# Patient Record
Sex: FEMALE | Race: BLACK OR AFRICAN AMERICAN | NOT HISPANIC OR LATINO | Employment: FULL TIME | URBAN - METROPOLITAN AREA
[De-identification: names, ages, dates, MRNs, and addresses within clinical notes are randomized per-mention and may not be internally consistent; named-entity substitution may affect disease eponyms.]

---

## 2017-10-01 ENCOUNTER — HOSPITAL ENCOUNTER (EMERGENCY)
Facility: HOSPITAL | Age: 57
Discharge: HOME | End: 2017-10-01
Attending: EMERGENCY MEDICINE
Payer: MEDICAID

## 2017-10-01 VITALS
OXYGEN SATURATION: 96 % | DIASTOLIC BLOOD PRESSURE: 85 MMHG | HEIGHT: 59 IN | SYSTOLIC BLOOD PRESSURE: 145 MMHG | HEART RATE: 75 BPM | TEMPERATURE: 98 F | BODY MASS INDEX: 38.71 KG/M2 | WEIGHT: 192 LBS | RESPIRATION RATE: 16 BRPM

## 2017-10-01 DIAGNOSIS — R51.9 HEADACHE IN FRONT OF HEAD: Primary | ICD-10-CM

## 2017-10-01 PROCEDURE — 82962 GLUCOSE BLOOD TEST: CPT

## 2017-10-01 PROCEDURE — 99283 EMERGENCY DEPT VISIT LOW MDM: CPT | Mod: 25

## 2017-10-01 PROCEDURE — 25000003 PHARM REV CODE 250: Performed by: EMERGENCY MEDICINE

## 2017-10-01 RX ORDER — BUTALBITAL, ACETAMINOPHEN AND CAFFEINE 50; 325; 40 MG/1; MG/1; MG/1
2 TABLET ORAL
Status: COMPLETED | OUTPATIENT
Start: 2017-10-01 | End: 2017-10-01

## 2017-10-01 RX ORDER — IBUPROFEN 600 MG/1
600 TABLET ORAL EVERY 6 HOURS PRN
Qty: 20 TABLET | Refills: 0 | Status: SHIPPED | OUTPATIENT
Start: 2017-10-01 | End: 2021-03-19

## 2017-10-01 RX ADMIN — BUTALBITAL, ACETAMINOPHEN AND CAFFEINE 2 TABLET: 50; 325; 40 TABLET ORAL at 08:10

## 2017-10-01 NOTE — ED TRIAGE NOTES
55 yo female comes in with the cc of headache and light headedness. Pt claims she hasn;t been on bp meds for two months . Pt denies any other pains or n/v.

## 2017-10-01 NOTE — DISCHARGE INSTRUCTIONS
Please return immediately if you get worse or if new problems develop.  Please make an appointment to see her primary care doctor this week.  Rest.  Ibuprofen for headache.

## 2017-10-01 NOTE — ED PROVIDER NOTES
Encounter Date: 10/1/2017    SCRIBE #1 NOTE: I, Lopez Leeseamus, am scribing for, and in the presence of, Сергей Garg MD. Other sections scribed: HPI, ROS.       History     Chief Complaint   Patient presents with    Headache     headache and light headed x 1 week.  comes and goes.  denies n/v/d or fever.  denies blurred or double vision.   Out of BP meds since 9/13.  difficulty getting cleared with ins.     CC: Headache  HPI: This 56 y.o. female with Hx of HTN, DM, hystertectomy presents to the ED c/o elevated blood pressure for the past 1-2 months. Pt states that since 8/13 she has been unable to fill her prescription for her blood pressure medication. Pt states that she has been having HAs, and is having a mild frontal HA this morning. She denies fever, chest pain, SOB, dizziness.        The history is provided by the patient.     Review of patient's allergies indicates:  No Known Allergies  Past Medical History:   Diagnosis Date    Diabetes mellitus     Hypertension      Past Surgical History:   Procedure Laterality Date    HYSTERECTOMY       No family history on file.  Social History   Substance Use Topics    Smoking status: Never Smoker    Smokeless tobacco: Not on file    Alcohol use No     Review of Systems   Constitutional: Negative for chills and fever.   HENT: Negative for rhinorrhea and sore throat.    Eyes: Negative for pain and visual disturbance.   Respiratory: Negative for cough and shortness of breath.    Cardiovascular: Negative for chest pain.   Gastrointestinal: Negative for abdominal pain, diarrhea, nausea and vomiting.   Genitourinary: Negative for dysuria and flank pain.   Musculoskeletal: Negative for arthralgias and myalgias.   Skin: Negative for rash and wound.   Neurological: Positive for headaches. Negative for dizziness and syncope.       Physical Exam     Initial Vitals [10/01/17 0801]   BP Pulse Resp Temp SpO2   (!) 145/85 75 16 98.4 °F (36.9 °C) 96 %      MAP       105          Physical Exam  The patient was examined specifically for the following:   General:No significant distress, Good color, Warm and dry. Head and neck:Scalp atraumatic, Neck supple. Neurological:Appropriate conversation, Gross motor deficits. Eyes:Conjugate gaze, Clear corneas. ENT: No epistaxis. Cardiac: Regular rate and rhythm, Grossly normal heart tones. Pulmonary: Wheezing, Rales. Gastrointestinal: Abdominal tenderness, Abdominal distention. Musculoskeletal: Extremity deformity, Apparent pain with range of motion of the joints. Skin: Rash.   The findings on examination were normal except for the following: The patient's blood pressures 150/78.  The patient's blood sugar is 141.  The lungs are clear.  The heart tones are normal.  The neck is supple.  Mental status examination, cranial nerves, motor and sensory examination are normal.  ED Course   Procedures  Labs Reviewed   POCT GLUCOSE - Abnormal; Notable for the following:        Result Value    POCT Glucose 144 (*)     All other components within normal limits         Medical decision making: Given the above, this patient presents to the emergency room with a frontal headache.  It is mild.  The patient has no neck pain or neurologic deficits there is no history of head trauma or fever.  Patient is worried about her blood pressure but she    has an acceptable blood pressure at 151/70 during the physical exam.  She is worried about her diabetes but her blood sugars 141 on metformin.  She is not on any blood pressure medicine. I will discharge this patient outpatient evaluation and treatment on ibuprofen.  I will have her follow-up primary care.  The patient is not distressed.  I doubt meningitis subarachnoid hemorrhage and intraparenchymal bleeding.                   Scribe Attestation:   Scribe #1: I performed the above scribed service and the documentation accurately describes the services I performed. I attest to the accuracy of the note.    Attending  Attestation:           Physician Attestation for Scribe:  Physician Attestation Statement for Scribe #1: I, Сергей Garg MD, reviewed documentation, as scribed by Lopez Drake in my presence, and it is both accurate and complete.                 ED Course      Clinical Impression:   The encounter diagnosis was Headache in front of head.                           Сергей Garg MD  10/03/17 0722

## 2017-10-02 LAB — POCT GLUCOSE: 144 MG/DL (ref 70–110)

## 2020-02-13 NOTE — PROGRESS NOTES
Assessment & Plan (all problems are new to me)  Problem List Items Addressed This Visit        Cardiac/Vascular    Essential hypertension    Current Assessment & Plan     Not at goal, pt does not take meds on a regular basis. She took none for a while as no insurance. I urged her to take her meds until next visit, will do BW and will reassess in one week.         Relevant Orders    CBC auto differential    Comprehensive metabolic panel    Heart murmur    Current Assessment & Plan     As reported by pt (Dx in her teens), but PE normal today            Renal/    Pain of both breasts    Current Assessment & Plan     No concerning element at PE. Strong family Hx. Will order mammo         Relevant Orders    Mammo Digital Diagnostic Bilateral       Endocrine    Type 2 diabetes mellitus with hyperglycemia, without long-term current use of insulin    Current Assessment & Plan     No info, pt is on metformin and glipizide, she does not check her BS, does not monitor lifestyle. Will do BW, will refer for eye exam, and will check feet at next visit. I will also refer to DME as pt states she does not know how to use meter? Next visit one week         Relevant Medications    glipiZIDE (GLUCOTROL) 10 MG tablet    Other Relevant Orders    Lipid panel    Hemoglobin A1c    Microalbumin/creatinine urine ratio    BMI 40.0-44.9, adult    Current Assessment & Plan     Needs to work on lifestyle         Vitamin D deficiency    Current Assessment & Plan     Will check level         Relevant Orders    Vitamin D       Orthopedic    Arthritis of ankle    Current Assessment & Plan     Difficult to assess today, will reassess at next visit. Chronic x years in a pt who is ?            Other    Routine general medical examination at a health care facility    Current Assessment & Plan     Had flu shot, but needs to be updated for all other preventative measures         Relevant Orders    TSH    Hepatitis C antibody    HIV 1/2 Ag/Ab  (4th Gen)      Other Visit Diagnoses     Establishing care with new doctor, encounter for    -  Primary    Pt not seen by PCP for months. Multiple problems. Will assess today, do BW and see her back in one week to reassess            Health Maintenance reviewed, only had flu shot done this season, all other measures to be reviewed..    Follow-up: Follow up in about 1 week (around 2/21/2020).  ______________________________________________________________________    Chief Complaint  Chief Complaint   Patient presents with    Cough       HPI  Joanie Chandler is a 59 y.o. female pt with multiple medical diagnoses as listed in the medical history and problem list that presents for what she states at triage to be cough but then she states she does not come for this reason? Pt is new to me. She states she had a PCP before who is not present anymore, she also lost her insurance for a while, it is unclear when she was seen by PCP last (around 6 months ago?), and when she was able to resume medications. She is here with her 81 y old mother who adopted her when she was 6 year old.  As per problems:  1) sore breasts, bilaterally, x 1 month. No lumps at self exam, no discharge. She cannot remember when she had her last mammo. She has a strong family H/O breast cancer (3 sisters, one of them passed away from cancer. These are her biological sisters).  2) DM: it is unclear if she took her meds and which ones. She never received insulin. She does not know her A1C, she does not check her BS at her place, even if she has a meter, nor does she have regular eye and feet controls.  3) HTN: she is on CCB and ARB but takes them randomly, last time one week ago? Always in the PM as she states they make her sleepy.  4) ankle pain: bilateral, she states she has OA?  5) heart murmur: states she was diagnosed when she was in her teens.  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        PAST MEDICAL HISTORY:  Past Medical History:   Diagnosis Date    Allergy     Arthritis     Blood clot in vein     Diabetes mellitus     Diabetes mellitus, type 2     Hyperlipidemia     Hypertension        PAST SURGICAL HISTORY:  Past Surgical History:   Procedure Laterality Date    HYSTERECTOMY         SOCIAL HISTORY:  Social History     Socioeconomic History    Marital status: Single     Spouse name: Not on file    Number of children: Not on file    Years of education: Not on file    Highest education level: Not on file   Occupational History    Not on file   Social Needs    Financial resource strain: Not on file    Food insecurity:     Worry: Not on file     Inability: Not on file    Transportation needs:     Medical: Not on file     Non-medical: Not on file   Tobacco Use    Smoking status: Never Smoker   Substance and Sexual Activity    Alcohol use: No    Drug use: No    Sexual activity: Not on file   Lifestyle    Physical activity:     Days per week: Not on file     Minutes per session: Not on file    Stress: Not on file   Relationships    Social connections:     Talks on phone: Not on file     Gets together: Not on file     Attends Sikhism service: Not on file     Active member of club or organization: Not on file     Attends meetings of clubs or organizations: Not on file     Relationship status: Not on file   Other Topics Concern    Not on file   Social History Narrative    Not on file       FAMILY HISTORY:  Family History   Problem Relation Age of Onset    Diabetes Mother     Arthritis Mother     Breast cancer Sister     Breast cancer Sister     Breast cancer Sister        ALLERGIES AND MEDICATIONS: updated and reviewed.  Review of patient's allergies indicates:  No Known Allergies  Current Outpatient Medications   Medication Sig Dispense Refill    amLODIPine (NORVASC) 10 MG tablet Take 10 mg by mouth.      glipiZIDE (GLUCOTROL) 10 MG tablet Take 10 mg by  mouth 2 (two) times daily before meals.      metformin (GLUCOPHAGE) 1000 MG tablet Take 1,000 mg by mouth 2 (two) times daily with meals.      ibuprofen (ADVIL,MOTRIN) 600 MG tablet Take 1 tablet (600 mg total) by mouth every 6 (six) hours as needed for Pain. (Patient not taking: Reported on 2/14/2020) 20 tablet 0    valsartan (DIOVAN) 80 MG tablet Take 80 mg by mouth once daily.       No current facility-administered medications for this visit.          ROS  Review of Systems   Constitutional: Negative for activity change, appetite change, fatigue and fever.        Bilateral breast pain   HENT: Negative for congestion, postnasal drip, rhinorrhea, sinus pressure and sore throat.    Eyes: Negative for pain, discharge and redness.   Respiratory: Negative for cough, chest tightness and shortness of breath.    Cardiovascular: Negative for chest pain, palpitations and leg swelling.   Gastrointestinal: Negative for abdominal pain, constipation and nausea.   Endocrine: Negative for cold intolerance and heat intolerance.   Genitourinary: Negative for difficulty urinating, flank pain, frequency, menstrual problem, pelvic pain, urgency and vaginal discharge.   Musculoskeletal: Positive for arthralgias. Negative for back pain, joint swelling and neck pain.        Bilateral ankles   Skin: Negative for color change and rash.   Allergic/Immunologic: Negative for environmental allergies and food allergies.   Neurological: Negative for weakness, numbness and headaches.   Hematological: Negative for adenopathy.   Psychiatric/Behavioral: Negative for behavioral problems, hallucinations, sleep disturbance and suicidal ideas. The patient is not nervous/anxious.          Physical Exam  Vitals:    02/14/20 0856 02/14/20 0939   BP: (!) 150/88 (!) 152/90   BP Location: Left arm Left arm   Patient Position: Sitting Sitting   BP Method: Large (Manual) Large (Manual)   Pulse: 93    Resp: 20    Temp: 98.3 °F (36.8 °C)    TempSrc: Oral   "  SpO2: 97%    Weight: 91.2 kg (201 lb 1 oz)    Height: 4' 11" (1.499 m)     Body mass index is 40.61 kg/m².  Weight: 91.2 kg (201 lb 1 oz)   Height: 4' 11" (149.9 cm)   Physical Exam   Constitutional: She is oriented to person, place, and time. She appears well-developed and well-nourished.   Large breasts, no masses, no redness, no pain to palpation, no discharge   HENT:   Right Ear: External ear normal.   Left Ear: External ear normal.   Nose: Nose normal.   Mouth/Throat: Oropharynx is clear and moist.   Eyes: Pupils are equal, round, and reactive to light. Conjunctivae and EOM are normal.   Neck: Normal range of motion. Neck supple. No thyromegaly present.   Cardiovascular: Normal rate, regular rhythm, normal heart sounds and intact distal pulses.   Pulmonary/Chest: Effort normal and breath sounds normal. No respiratory distress. She has no wheezes.   Abdominal: Soft. Bowel sounds are normal. She exhibits no mass. There is no tenderness.   Genitourinary: No vaginal discharge found.   Musculoskeletal: Normal range of motion.   Lymphadenopathy:     She has no cervical adenopathy.   Neurological: She is alert and oriented to person, place, and time.   Skin: Skin is warm and dry.   Psychiatric: She has a normal mood and affect. Her behavior is normal. Judgment and thought content normal.   Nursing note and vitals reviewed.          Health Maintenance       Date Due Completion Date    Hepatitis C Screening 1960 ---    Lipid Panel 1960 ---    HIV Screening 10/17/1975 ---    TETANUS VACCINE 10/17/1978 ---    Mammogram 10/17/2000 ---    Shingles Vaccine (1 of 2) 10/17/2010 ---    Colonoscopy 10/17/2010 ---    Influenza Vaccine (1) 09/01/2019 ---        Lab work came back with A1C 10.4. Pt is to come back on 2/22 to discuss results. Will reassess for insulin and will refer her to DME.  "

## 2020-02-14 ENCOUNTER — LAB VISIT (OUTPATIENT)
Dept: LAB | Facility: HOSPITAL | Age: 60
End: 2020-02-14
Attending: INTERNAL MEDICINE
Payer: COMMERCIAL

## 2020-02-14 ENCOUNTER — OFFICE VISIT (OUTPATIENT)
Dept: FAMILY MEDICINE | Facility: CLINIC | Age: 60
End: 2020-02-14
Payer: COMMERCIAL

## 2020-02-14 VITALS
RESPIRATION RATE: 20 BRPM | DIASTOLIC BLOOD PRESSURE: 90 MMHG | SYSTOLIC BLOOD PRESSURE: 152 MMHG | HEIGHT: 59 IN | BODY MASS INDEX: 40.53 KG/M2 | WEIGHT: 201.06 LBS | TEMPERATURE: 98 F | OXYGEN SATURATION: 97 % | HEART RATE: 93 BPM

## 2020-02-14 DIAGNOSIS — Z12.31 ENCOUNTER FOR SCREENING MAMMOGRAM FOR BREAST CANCER: ICD-10-CM

## 2020-02-14 DIAGNOSIS — E55.9 VITAMIN D DEFICIENCY: ICD-10-CM

## 2020-02-14 DIAGNOSIS — Z76.89 ESTABLISHING CARE WITH NEW DOCTOR, ENCOUNTER FOR: Primary | ICD-10-CM

## 2020-02-14 DIAGNOSIS — Z00.00 ROUTINE GENERAL MEDICAL EXAMINATION AT A HEALTH CARE FACILITY: ICD-10-CM

## 2020-02-14 DIAGNOSIS — E11.65 TYPE 2 DIABETES MELLITUS WITH HYPERGLYCEMIA, WITHOUT LONG-TERM CURRENT USE OF INSULIN: ICD-10-CM

## 2020-02-14 DIAGNOSIS — I10 ESSENTIAL HYPERTENSION: ICD-10-CM

## 2020-02-14 DIAGNOSIS — M19.079 ARTHRITIS OF ANKLE: ICD-10-CM

## 2020-02-14 DIAGNOSIS — R01.1 HEART MURMUR: ICD-10-CM

## 2020-02-14 DIAGNOSIS — N64.4 PAIN OF BOTH BREASTS: ICD-10-CM

## 2020-02-14 LAB
25(OH)D3+25(OH)D2 SERPL-MCNC: 10 NG/ML (ref 30–96)
ALBUMIN SERPL BCP-MCNC: 3.8 G/DL (ref 3.5–5.2)
ALBUMIN/CREAT UR: 5.6 UG/MG (ref 0–30)
ALP SERPL-CCNC: 121 U/L (ref 55–135)
ALT SERPL W/O P-5'-P-CCNC: 15 U/L (ref 10–44)
ANION GAP SERPL CALC-SCNC: 8 MMOL/L (ref 8–16)
AST SERPL-CCNC: 16 U/L (ref 10–40)
BASOPHILS # BLD AUTO: 0.03 K/UL (ref 0–0.2)
BASOPHILS NFR BLD: 0.4 % (ref 0–1.9)
BILIRUB SERPL-MCNC: 0.4 MG/DL (ref 0.1–1)
BUN SERPL-MCNC: 10 MG/DL (ref 6–20)
CALCIUM SERPL-MCNC: 9.9 MG/DL (ref 8.7–10.5)
CHLORIDE SERPL-SCNC: 105 MMOL/L (ref 95–110)
CHOLEST SERPL-MCNC: 232 MG/DL (ref 120–199)
CHOLEST/HDLC SERPL: 4.1 {RATIO} (ref 2–5)
CO2 SERPL-SCNC: 28 MMOL/L (ref 23–29)
CREAT SERPL-MCNC: 0.9 MG/DL (ref 0.5–1.4)
CREAT UR-MCNC: 142 MG/DL (ref 15–325)
DIFFERENTIAL METHOD: ABNORMAL
EOSINOPHIL # BLD AUTO: 0 K/UL (ref 0–0.5)
EOSINOPHIL NFR BLD: 0.4 % (ref 0–8)
ERYTHROCYTE [DISTWIDTH] IN BLOOD BY AUTOMATED COUNT: 13.2 % (ref 11.5–14.5)
EST. GFR  (AFRICAN AMERICAN): >60 ML/MIN/1.73 M^2
EST. GFR  (NON AFRICAN AMERICAN): >60 ML/MIN/1.73 M^2
ESTIMATED AVG GLUCOSE: 252 MG/DL (ref 68–131)
GLUCOSE SERPL-MCNC: 231 MG/DL (ref 70–110)
HBA1C MFR BLD HPLC: 10.4 % (ref 4–5.6)
HCT VFR BLD AUTO: 46.1 % (ref 37–48.5)
HDLC SERPL-MCNC: 57 MG/DL (ref 40–75)
HDLC SERPL: 24.6 % (ref 20–50)
HGB BLD-MCNC: 13.6 G/DL (ref 12–16)
IMM GRANULOCYTES # BLD AUTO: 0.01 K/UL (ref 0–0.04)
IMM GRANULOCYTES NFR BLD AUTO: 0.1 % (ref 0–0.5)
LDLC SERPL CALC-MCNC: 152.4 MG/DL (ref 63–159)
LYMPHOCYTES # BLD AUTO: 2 K/UL (ref 1–4.8)
LYMPHOCYTES NFR BLD: 24.5 % (ref 18–48)
MCH RBC QN AUTO: 27.5 PG (ref 27–31)
MCHC RBC AUTO-ENTMCNC: 29.5 G/DL (ref 32–36)
MCV RBC AUTO: 93 FL (ref 82–98)
MICROALBUMIN UR DL<=1MG/L-MCNC: 8 UG/ML
MONOCYTES # BLD AUTO: 0.4 K/UL (ref 0.3–1)
MONOCYTES NFR BLD: 5.5 % (ref 4–15)
NEUTROPHILS # BLD AUTO: 5.5 K/UL (ref 1.8–7.7)
NEUTROPHILS NFR BLD: 69.1 % (ref 38–73)
NONHDLC SERPL-MCNC: 175 MG/DL
NRBC BLD-RTO: 0 /100 WBC
PLATELET # BLD AUTO: 265 K/UL (ref 150–350)
PMV BLD AUTO: 12 FL (ref 9.2–12.9)
POTASSIUM SERPL-SCNC: 4.3 MMOL/L (ref 3.5–5.1)
PROT SERPL-MCNC: 7.8 G/DL (ref 6–8.4)
RBC # BLD AUTO: 4.94 M/UL (ref 4–5.4)
SODIUM SERPL-SCNC: 141 MMOL/L (ref 136–145)
TRIGL SERPL-MCNC: 113 MG/DL (ref 30–150)
TSH SERPL DL<=0.005 MIU/L-ACNC: 1.08 UIU/ML (ref 0.4–4)
WBC # BLD AUTO: 7.99 K/UL (ref 3.9–12.7)

## 2020-02-14 PROCEDURE — 84443 ASSAY THYROID STIM HORMONE: CPT

## 2020-02-14 PROCEDURE — 86703 HIV-1/HIV-2 1 RESULT ANTBDY: CPT

## 2020-02-14 PROCEDURE — 3077F SYST BP >= 140 MM HG: CPT | Mod: CPTII,S$GLB,, | Performed by: INTERNAL MEDICINE

## 2020-02-14 PROCEDURE — 82043 UR ALBUMIN QUANTITATIVE: CPT

## 2020-02-14 PROCEDURE — 3078F PR MOST RECENT DIASTOLIC BLOOD PRESSURE < 80 MM HG: ICD-10-PCS | Mod: CPTII,S$GLB,, | Performed by: INTERNAL MEDICINE

## 2020-02-14 PROCEDURE — 3008F PR BODY MASS INDEX (BMI) DOCUMENTED: ICD-10-PCS | Mod: CPTII,S$GLB,, | Performed by: INTERNAL MEDICINE

## 2020-02-14 PROCEDURE — 83036 HEMOGLOBIN GLYCOSYLATED A1C: CPT

## 2020-02-14 PROCEDURE — 82306 VITAMIN D 25 HYDROXY: CPT

## 2020-02-14 PROCEDURE — 36415 COLL VENOUS BLD VENIPUNCTURE: CPT | Mod: PO

## 2020-02-14 PROCEDURE — 85025 COMPLETE CBC W/AUTO DIFF WBC: CPT

## 2020-02-14 PROCEDURE — 80053 COMPREHEN METABOLIC PANEL: CPT

## 2020-02-14 PROCEDURE — 99999 PR PBB SHADOW E&M-EST. PATIENT-LVL IV: CPT | Mod: PBBFAC,,, | Performed by: INTERNAL MEDICINE

## 2020-02-14 PROCEDURE — 99204 OFFICE O/P NEW MOD 45 MIN: CPT | Mod: S$GLB,,, | Performed by: INTERNAL MEDICINE

## 2020-02-14 PROCEDURE — 99999 PR PBB SHADOW E&M-EST. PATIENT-LVL IV: ICD-10-PCS | Mod: PBBFAC,,, | Performed by: INTERNAL MEDICINE

## 2020-02-14 PROCEDURE — 99204 PR OFFICE/OUTPT VISIT, NEW, LEVL IV, 45-59 MIN: ICD-10-PCS | Mod: S$GLB,,, | Performed by: INTERNAL MEDICINE

## 2020-02-14 PROCEDURE — 86803 HEPATITIS C AB TEST: CPT

## 2020-02-14 PROCEDURE — 3008F BODY MASS INDEX DOCD: CPT | Mod: CPTII,S$GLB,, | Performed by: INTERNAL MEDICINE

## 2020-02-14 PROCEDURE — 3077F PR MOST RECENT SYSTOLIC BLOOD PRESSURE >= 140 MM HG: ICD-10-PCS | Mod: CPTII,S$GLB,, | Performed by: INTERNAL MEDICINE

## 2020-02-14 PROCEDURE — 80061 LIPID PANEL: CPT

## 2020-02-14 PROCEDURE — 3078F DIAST BP <80 MM HG: CPT | Mod: CPTII,S$GLB,, | Performed by: INTERNAL MEDICINE

## 2020-02-14 RX ORDER — AMLODIPINE BESYLATE 10 MG/1
10 TABLET ORAL
COMMUNITY
End: 2021-03-19

## 2020-02-14 RX ORDER — GLIPIZIDE 10 MG/1
10 TABLET ORAL
COMMUNITY
End: 2021-03-19 | Stop reason: SDUPTHER

## 2020-02-14 NOTE — ASSESSMENT & PLAN NOTE
No info, pt is on metformin and glipizide, she does not check her BS, does not monitor lifestyle. Will do BW, will refer for eye exam, and will check feet at next visit. I will also refer to DME as pt states she does not know how to use meter? Next visit one week

## 2020-02-14 NOTE — PATIENT INSTRUCTIONS
Diet: Diabetes  Food is an important tool that you can use to control diabetes and stay healthy. Eating well-balanced meals in the correct amounts will help you control your blood glucose levels and prevent low blood sugar reactions. It will also help you reduce the health risks of diabetes. There is no one specific diet that is right for everyone with diabetes. But there are general guidelines to follow. A registered dietitian (RD) will create a tailored diet approach thats just right for you. He or she will also help you plan healthy meals and snacks. If you have any questions, call your dietitian for advice.     Guidelines for success  Talk with your healthcare provider before starting a diabetes diet or weight loss program. If you haven't talked with a dietitian yet, ask your provider for a referral. The following guidelines can help you succeed:  · Select foods from the 6 food groups below. Your dietitian will help you find food choices within each group. He or she will also show you serving sizes and how many servings you can have at each meal.  ¨ Grains, beans, and starchy vegetables  ¨ Vegetables  ¨ Fruit  ¨ Milk or yogurt  ¨ Meat, poultry, fish, or tofu  ¨ Healthy fats  · Check your blood sugar levels as directed by your provider. Take any medicine as prescribed by your provider.  · Learn to read food labels and pick the right portion sizes.  · Eat only the amount of food in your meal plan. Eat about the same amount of food at regular times each day. Dont skip meals. Eat meals 4 to 5 hours apart, with snacks in between.  · Limit alcohol. It raises blood sugar levels. Drink water or calorie-free diet drinks that use safe sweeteners.  · Eat less fat to help lower your risk of heart disease. Use nonfat or low-fat dairy products and lean meats. Avoid fried foods. Use cooking oils that are unsaturated, such as olive, canola, or peanut oil.  · Talk with your dietitian about safe sugar substitutes.  · Avoid  added salt. It can contribute to high blood pressure, which can cause heart disease. People with diabetes already have a risk of high blood pressure and heart disease.  · Stay at a healthy weight. If you need to lose weight, cut down on your portion sizes. But dont skip meals. Exercise is an important part of any weight management program. Talk with your provider about an exercise program thats right for you.  · For more information about the best diet plan for you, talk with a registered dietitian (RD). To find an RD in your area, contact:  ¨ Academy of Nutrition and Dietetics www.eatright.org  ¨ The American Diabetes Association [phone] www.diabetes.org  Date Last Reviewed: 8/1/2016 © 2000-2017 [employer]. [address] [place] [address], [place] [address]. All rights reserved. This information is not intended as a substitute for professional medical care. Always follow your healthcare professional's instructions.          Established High Blood Pressure    High blood pressure (hypertension) is a chronic disease. Often, healthcare providers dont know what causes it. But it can be caused by certain health conditions and medicines.  If you have high blood pressure, you may not have any symptoms. If you do have symptoms, they may include headache, dizziness, changes in your vision, chest pain, and shortness of breath. But even without symptoms, high blood pressure thats not treated raises your risk for heart attack and stroke. High blood pressure is a serious health risk and shouldnt be ignored.  A blood pressure reading is made up of two numbers: a higher number over a lower number. The top number is the systolic pressure. The bottom number is the diastolic pressure. A normal blood pressure is a systolic pressure of  less than 120 over a diastolic pressure of less than 80. You will see your blood pressure readings written together. For example, a person with a systolic pressure of 188 and a  diastolic pressure of 78 will have 118/78 written in the medical record.  High blood pressure is when either the top number is 140 or higher, or the bottom number is 90 or higher. This must be the result when taking your blood pressure a number of times. The blood pressures between normal and high are called prehypertension.  Home care  If you have high blood pressure, you should do what is listed below to lower your blood pressure. If you are taking medicines for high blood pressure, these methods may reduce or end your need for medicines in the future.  · Begin a weight-loss program if you are overweight.  · Cut back on how much salt you get in your diet. Heres how to do this:  ¨ Dont eat foods that have a lot of salt. These include olives, pickles, smoked meats, and salted potato chips.  ¨ Dont add salt to your food at the table.  ¨ Use only small amounts of salt when cooking.  · Start an exercise program. Talk with your healthcare provider about the type of exercise program that would be best for you. It doesn't have to be hard. Even brisk walking for 20 minutes 3 times a week is a good form of exercise.  · Dont take medicines that stimulate the heart. This includes many over-the-counter cold and sinus decongestant pills and sprays, as well as diet pills. Check the warnings about hypertension on the label. Before buying any over-the-counter medicines or supplements, always ask the pharmacist about the product's potential interaction with your high blood pressure and your high blood pressure medicines.  · Stimulants such as amphetamine or cocaine could be deadly for someone with high blood pressure. Never take these.  · Limit how much caffeine you get in your diet. Switch to caffeine-free products.  · Stop smoking. If you are a long-time smoker, this can be hard. Talk to your healthcare provider about medicines and nicotine replacement options to help you. Also, enroll in a stop-smoking program to make it  more likely that you will quit for good.  · Learn how to handle stress. This is an important part of any program to lower blood pressure. Learn about relaxation methods like meditation, yoga, or biofeedback.  · If your provider prescribed medicines, take them exactly as directed. Missing doses may cause your blood pressure get out of control.  · If you miss a dose or doses, check with your healthcare provider or pharmacist about what to do.  · Consider buying an automatic blood pressure machine. Ask your provider for a recommendation. You can get one of these at most pharmacies.     The American Heart Association recommends the following guidelines for home blood pressure monitoring:  · Don't smoke or drink coffee for 30 minutes before taking your blood pressure.  · Go to the bathroom before the test.  · Relax for 5 minutes before taking the measurement.  · Sit with your back supported (don't sit on a couch or soft chair); keep your feet on the floor uncrossed. Place your arm on a solid flat surface (like a table) with the upper part of the arm at heart level. Place the middle of the cuff directly above the eye of the elbow. Check the monitor's instruction manual for an illustration.  · Take multiple readings. When you measure, take 2 to 3 readings one minute apart and record all of the results.  · Take your blood pressure at the same time every day, or as your healthcare provider recommends.  · Record the date, time, and blood pressure reading.  · Take the record with you to your next medical appointment. If your blood pressure monitor has a built-in memory, simply take the monitor with you to your next appointment.  · Call your provider if you have several high readings. Don't be frightened by a single high blood pressure reading, but if you get several high readings, check in with your healthcare provider.  · Note: When blood pressure reaches a systolic (top number) of 180 or higher OR diastolic (bottom number)  of 110 or higher, seek emergency medical treatment.  Follow-up care  You will need to see your healthcare provider regularly. This is to check your blood pressure and to make changes to your medicines. Make a follow-up appointment as directed. Bring the record of your home blood pressure readings to the appointment.  When to seek medical advice  Call your healthcare provider right away if any of these occur:  · Blood pressure reaches a systolic (upper number) of 180 or higher OR a diastolic (bottom number) of 110 or higher  · Chest pain or shortness of breath  · Severe headache  · Throbbing or rushing sound in the ears  · Nosebleed  · Sudden severe pain in your belly (abdomen)  · Extreme drowsiness, confusion, or fainting  · Dizziness or spinning sensation (vertigo)  · Weakness of an arm or leg or one side of the face  · You have problems speaking or seeing   Date Last Reviewed: 12/1/2016  © 8461-1994 [employer]. [address] [place] [address]. All rights reserved. This information is not intended as a substitute for professional medical care. Always follow your healthcare professional's instructions.          Lifestyle Changes to Control Cholesterol  You can control your cholesterol through diet, exercise, weight management, quitting smoking, stress management, and taking your medicines right. These things can also lower your risk for cardiovascular disease.    Eating healthy  Your healthcare provider will give you information on diet changes you may need to make. Your provider may recommend that you see a registered dietitian for help with diet changes. Changes may include:  · Cutting back on the amount of fat and cholesterol in your meals  · Eating less salt (sodium). This is especially important if you have high blood pressure.  · Eating more fresh vegetables and fruits  · Eating lean proteins such as fish, poultry, beans, and peas  · Eating less red meat and processed meats  · Using  low-fat dairy products  · Using vegetable and nut oils in limited amounts  · Limiting how many sweets and processed foods like chips, cookies, and baked goods that you eat   · Limiting how many sugar-sweetened beverages you drink  · Limiting how often you eat out  Getting exercise  Regular exercise is a good way to help your body control cholesterol. Regular exercise can help in many ways. It can:  · Raise your good cholesterol  · Help lower your bad cholesterol  · Let blood flow better through your body  · Give more oxygen to your muscles and tissues  · Help you manage your weight  · Help your heart pump better  · Lower your blood pressure  Your healthcare provider may recommend that you get more physical activity if you haven't been active. Your provider may recommend that you get moderate to vigorous physical activity for at least 40 minutes each day. You should do this for at least 3 to 4 days each week. A few examples of moderate to vigorous activity are:  · Walking at a brisk pace. This is about 3 to 4 miles per hour.  · Jogging or running  · Swimming or water aerobics  · Hiking  · Dancing  · Martial arts  · Tennis  · Riding a bicycle or stationary bike  · Dancing  Managing your weight  If you are overweight or obese, your healthcare provider will work with you to help you lose weight and lower your BMI (body mass index). Making diet changes and getting more physical activity can help. Changing your diet will help you lose weight more easily than adding exercise.  Quitting smoking  Smoking and other tobacco use can raise cholesterol and make it harder to control. Quitting is tough. But millions of people have given up tobacco for good. You can quit, too! Think about some of the reasons below to quit smoking. Do any of them make you think twice about your smoking habit?  Stop smoking because it:  · Keeps your cholesterol high, even if you make all the other changes youre supposed to  · Damages your body.  It especially harms your heart, lungs, skin, and blood vessels.  · Makes you more likely to have a heart attack (acute myocardial infarction), stroke, or cancer  · Stains your teeth  · Makes your skin, clothes, and breath smell bad  · Costs a lot of money  Controlling stress   Learn ways to control stress. This will help you deal with stress in your home and work life. Controlling stress can greatly lower your risk of getting cardiovascular disease.  Making the most of medicines  Healthy eating and exercise are a good start to keeping your cholesterol down. But you may need some extra help from medicine. If your doctor prescribes medicine, be sure to take it exactly as directed. Remember:  · Tell your healthcare provider about all other medicines you take. This includes vitamins and herbs.  · Tell your healthcare provider if you have any side effects after starting to take a medicine. Examples of side effects to watch for include muscle aches, weakness, blurred vision, rust-colored urine, yellowing of eyes or skin (jaundice), and headache.  · Dont skip a dose or stop taking your medicine because you feel better or because your cholesterol numbers go down. Never stop taking your medicine unless your healthcare provider has told you its OK.  · Ask your healthcare provider if you have any questions about your medicines.  High risk groups  Some people may need to take medicines called statins to control their cholesterol. This is in addition to eating a healthy diet and getting regular exercise.  Statins can help you stay healthy. They can also help prevent a heart attack or stroke. You may need to take a statin if you are in one of these groups:  · Adults who have had a heart attack or stroke. Or adults who have had peripheral vascular disease, a ministroke (transient ischemic attack), or stable or unstable angina. This group also includes people who have had a procedure to restore blood flow through a blocked  artery. These procedures include percutaneous coronary intervention, angioplasty, stent, and open-heart bypass surgery.  · Adults who have diabetes. Or adults who are at higher risk of having a heart attack or stroke and have an LDL cholesterol level of 70 to 189 mg/dL  · Adults who are 21 years old or older and have an LDL cholesterol level of 190 mg/dL or higher.  If you are in a high-risk group, talk with your healthcare provider about your treatment goals. Make sure you understand why these goals are important, based on your own health history and your family history of heart disease or high cholesterol.  Make a plan to have regular cholesterol checks. You may need to fast before getting this test. Also ask your provider about any side effects your medicines may cause. Let your provider know about any side effects you have. You may need to take more than one medicine to reach the cholesterol goals that you and your provider decide on.  Date Last Reviewed: 10/1/2016  © 1829-4779 [employer]. [address] [place] [address], [place] [address]. All rights reserved. This information is not intended as a substitute for professional medical care. Always follow your healthcare professional's instructions.          Lifestyle Changes to Control Cholesterol  You can control your cholesterol through diet, exercise, weight management, quitting smoking, stress management, and taking your medicines right. These things can also lower your risk for cardiovascular disease.    Eating healthy  Your healthcare provider will give you information on diet changes you may need to make. Your provider may recommend that you see a registered dietitian for help with diet changes. Changes may include:  · Cutting back on the amount of fat and cholesterol in your meals  · Eating less salt (sodium). This is especially important if you have high blood pressure.  · Eating more fresh vegetables and fruits  · Eating lean proteins such as  fish, poultry, beans, and peas  · Eating less red meat and processed meats  · Using low-fat dairy products  · Using vegetable and nut oils in limited amounts  · Limiting how many sweets and processed foods like chips, cookies, and baked goods that you eat   · Limiting how many sugar-sweetened beverages you drink  · Limiting how often you eat out  Getting exercise  Regular exercise is a good way to help your body control cholesterol. Regular exercise can help in many ways. It can:  · Raise your good cholesterol  · Help lower your bad cholesterol  · Let blood flow better through your body  · Give more oxygen to your muscles and tissues  · Help you manage your weight  · Help your heart pump better  · Lower your blood pressure  Your healthcare provider may recommend that you get more physical activity if you haven't been active. Your provider may recommend that you get moderate to vigorous physical activity for at least 40 minutes each day. You should do this for at least 3 to 4 days each week. A few examples of moderate to vigorous activity are:  · Walking at a brisk pace. This is about 3 to 4 miles per hour.  · Jogging or running  · Swimming or water aerobics  · Hiking  · Dancing  · Martial arts  · Tennis  · Riding a bicycle or stationary bike  · Dancing  Managing your weight  If you are overweight or obese, your healthcare provider will work with you to help you lose weight and lower your BMI (body mass index). Making diet changes and getting more physical activity can help. Changing your diet will help you lose weight more easily than adding exercise.  Quitting smoking  Smoking and other tobacco use can raise cholesterol and make it harder to control. Quitting is tough. But millions of people have given up tobacco for good. You can quit, too! Think about some of the reasons below to quit smoking. Do any of them make you think twice about your smoking habit?  Stop smoking because it:  · Keeps your cholesterol  high, even if you make all the other changes youre supposed to  · Damages your body. It especially harms your heart, lungs, skin, and blood vessels.  · Makes you more likely to have a heart attack (acute myocardial infarction), stroke, or cancer  · Stains your teeth  · Makes your skin, clothes, and breath smell bad  · Costs a lot of money  Controlling stress   Learn ways to control stress. This will help you deal with stress in your home and work life. Controlling stress can greatly lower your risk of getting cardiovascular disease.  Making the most of medicines  Healthy eating and exercise are a good start to keeping your cholesterol down. But you may need some extra help from medicine. If your doctor prescribes medicine, be sure to take it exactly as directed. Remember:  · Tell your healthcare provider about all other medicines you take. This includes vitamins and herbs.  · Tell your healthcare provider if you have any side effects after starting to take a medicine. Examples of side effects to watch for include muscle aches, weakness, blurred vision, rust-colored urine, yellowing of eyes or skin (jaundice), and headache.  · Dont skip a dose or stop taking your medicine because you feel better or because your cholesterol numbers go down. Never stop taking your medicine unless your healthcare provider has told you its OK.  · Ask your healthcare provider if you have any questions about your medicines.  High risk groups  Some people may need to take medicines called statins to control their cholesterol. This is in addition to eating a healthy diet and getting regular exercise.  Statins can help you stay healthy. They can also help prevent a heart attack or stroke. You may need to take a statin if you are in one of these groups:  · Adults who have had a heart attack or stroke. Or adults who have had peripheral vascular disease, a ministroke (transient ischemic attack), or stable or unstable angina. This group also  includes people who have had a procedure to restore blood flow through a blocked artery. These procedures include percutaneous coronary intervention, angioplasty, stent, and open-heart bypass surgery.  · Adults who have diabetes. Or adults who are at higher risk of having a heart attack or stroke and have an LDL cholesterol level of 70 to 189 mg/dL  · Adults who are 21 years old or older and have an LDL cholesterol level of 190 mg/dL or higher.  If you are in a high-risk group, talk with your healthcare provider about your treatment goals. Make sure you understand why these goals are important, based on your own health history and your family history of heart disease or high cholesterol.  Make a plan to have regular cholesterol checks. You may need to fast before getting this test. Also ask your provider about any side effects your medicines may cause. Let your provider know about any side effects you have. You may need to take more than one medicine to reach the cholesterol goals that you and your provider decide on.  Date Last Reviewed: 10/1/2016  © 2863-2681 [employer]. [address] [place] [address], [place] [address]. All rights reserved. This information is not intended as a substitute for professional medical care. Always follow your healthcare professional's instructions.

## 2020-02-14 NOTE — ASSESSMENT & PLAN NOTE
Not at goal, pt does not take meds on a regular basis. She took none for a while as no insurance. I urged her to take her meds until next visit, will do BW and will reassess in one week.

## 2020-02-17 LAB
HCV AB SERPL QL IA: NEGATIVE
HIV 1+2 AB+HIV1 P24 AG SERPL QL IA: NEGATIVE

## 2020-02-20 NOTE — PROGRESS NOTES
.  Subjective:       Patient ID: Joanie Chandler is a 59 y.o. Black or  female patient    Chief Complaint: Diabetes (f-up lab results)      Ms. Joanie Chandler comes today accompanied by her mother to reassesss DM management and discuss results of blood work done on 2/14/2020 when I saw her to establish care.    HPI    She comes today stating that she never started to check her BS, she states she does not know how to do this, but her mom, former LNP who also has DM, offered to teach her but she declined. Mother states she is unwilling to do this. Pt doesn't deny,  Pt has not been taking metformin and glipizide for 2-3 months minimum but resumed at last visit as per her report.  She does not work on her lifestyle stating that she is too busy with her jobs for now.  Also:  - hypertension: states she takes her medications?  - breast pain as per last visit for which she will have mammo this PM.  - today reporting some cough that is dry, mainly during the day, present for months, with no associated symptoms, she states that she took meds for allergies, but shows me OTC anti-cold med with ibuprofen and decongestant.    Review of Systems   Constitutional: Negative for activity change, appetite change, fatigue and fever.        Bilateral breast pain   HENT: Negative for congestion, postnasal drip, rhinorrhea, sinus pressure and sore throat.    Eyes: Negative for pain, discharge and redness.   Respiratory: Positive for cough. Negative for chest tightness and shortness of breath.    Cardiovascular: Negative for chest pain, palpitations and leg swelling.   Gastrointestinal: Negative for abdominal pain, constipation and nausea.   Endocrine: Negative for cold intolerance and heat intolerance.   Genitourinary: Negative for difficulty urinating, flank pain, frequency, menstrual problem, pelvic pain, urgency and vaginal discharge.   Musculoskeletal: Positive for arthralgias. Negative for back pain, joint swelling and  "neck pain.        Bilateral ankles   Skin: Negative for color change and rash.   Allergic/Immunologic: Negative for environmental allergies and food allergies.   Neurological: Negative for weakness, numbness and headaches.   Hematological: Negative for adenopathy.   Psychiatric/Behavioral: Negative for behavioral problems, hallucinations, sleep disturbance and suicidal ideas. The patient is not nervous/anxious.        Objective:      Physical Exam   Constitutional: She is oriented to person, place, and time. She appears well-developed and well-nourished.   Large breasts, no masses, no redness, no pain to palpation, no discharge   HENT:   Right Ear: External ear normal.   Left Ear: External ear normal.   Nose: Nose normal.   Mouth/Throat: Oropharynx is clear and moist.   Eyes: Pupils are equal, round, and reactive to light. Conjunctivae and EOM are normal.   Neck: Normal range of motion. Neck supple. No thyromegaly present.   Cardiovascular: Normal rate, regular rhythm, normal heart sounds and intact distal pulses.   Pulmonary/Chest: Effort normal and breath sounds normal. No respiratory distress. She has no wheezes.   Abdominal: Soft. Bowel sounds are normal. She exhibits no mass. There is no tenderness.   Genitourinary: No vaginal discharge found.   Musculoskeletal: Normal range of motion.   Lymphadenopathy:     She has no cervical adenopathy.   Neurological: She is alert and oriented to person, place, and time.   Skin: Skin is warm and dry.   Psychiatric: She has a normal mood and affect. Her behavior is normal. Judgment and thought content normal.   Nursing note and vitals reviewed.      Vitals:    02/21/20 0957 02/21/20 1033   BP: (!) 152/90 (!) 158/86   BP Location: Right arm Right arm   Patient Position: Sitting Sitting   BP Method: Large (Manual) Large (Manual)   Pulse: 94    Resp: 20    Temp: 97.8 °F (36.6 °C)    TempSrc: Oral    SpO2: 98%    Weight: 91.6 kg (201 lb 15.1 oz)    Height: 4' 11" (1.499 m)  "     Body mass index is 40.79 kg/m².    Last Lab Results:     Lab Results   Component Value Date    WBC 7.99 02/14/2020    HGB 13.6 02/14/2020    HCT 46.1 02/14/2020     02/14/2020     02/14/2020    K 4.3 02/14/2020     02/14/2020    CO2 28 02/14/2020    BUN 10 02/14/2020    CREATININE 0.9 02/14/2020    CALCIUM 9.9 02/14/2020    ALBUMIN 3.8 02/14/2020    AST 16 02/14/2020    ALT 15 02/14/2020    CHOL 232 (H) 02/14/2020    TRIG 113 02/14/2020    HDL 57 02/14/2020    LDLCALC 152.4 02/14/2020    HGBA1C 10.4 (H) 02/14/2020    TSH 1.084 02/14/2020       RESULTS: Reviewed labs from last 2/14/2020.    Assessment:       1. Type 2 diabetes mellitus with hyperglycemia, without long-term current use of insulin    2. Hyperlipidemia LDL goal <70    3. Essential hypertension    4. BMI 40.0-44.9, adult    5. Pain of both breasts    6. Arthritis of ankle    7. Vitamin D deficiency    8. Need for pneumococcal vaccine        Plan:   Joanie WOOD was seen today for diabetes.    Diagnoses and all orders for this visit:    Type 2 diabetes mellitus with hyperglycemia, without long-term current use of insulin  -     Ambulatory referral/consult to Endocrinology; Future  -     Ambulatory referral/consult to Ophthalmology; Future  -     atorvastatin (LIPITOR) 40 MG tablet; Take 1 tablet (40 mg total) by mouth every evening.    Review of blood work results with pt and mother. A1C 10.4 %, no proteinuria. Pt not checking BS at home (states she does not know how to do this?), does not remember the level of previous A1C, she resumed metformin and glipizide when seen last but none taken for 2-3 months at least?  Plan of care:  1) referred to DME. Urged her not to miss appt. Will need education on BS checks at her place, lifestyle changes, as well as to be assessed re : possible changes of treatment.  2) urged her to go on with medications.  3) scheduled eye exam.   4) will check feet at next visit.  5) will start statins see  below.  6) will give Pneumovax today, had flu shot at last visit.    Hyperlipidemia LDL goal <70    New Dx. ASCD score 16.5 %, and LDL not at goal. Never treated? Will start atorva. Informed re: possible SE. Advised re: lifestyle changes.    Essential hypertension    Not at goal at recheck. I am not sure she takes her medications? She also needs to work on lifestyle. She cannot come back in 2 weeks due to her work as per her report. Will monitor.     BMI 40.0-44.9, adult    Will discuss lifestyle changes when goes to DME.    Pain of both breasts    See last visit, she will have mammo today.    Arthritis of ankle    Advised re: benefits of WL. Also the need not to take too much NSAIDs, may take APAP.    Vitamin D deficiency          -     ergocalciferol (ERGOCALCIFEROL) 50,000 unit Cap; Take 1 capsule (50,000 Units total) by mouth every 7 days    Severe, will substitute.    Need for pneumococcal vaccine  -     (In Office Administered) Pneumococcal Polysaccharide Vaccine (23 Valent) (SQ/IM)    Given today.    Follow up in about 3 months (around 5/21/2020).    I have spent 25 minutes in the care of this patient in presence of her mother with >50% in counseling and coordination of care regarding among others the need to be agreeable to work on DM management, severity of her present DM, review of the blood work, options of treatment, the need to be seen by specialist for BS checking teaching, treatment options, lifestyle changes.

## 2020-02-21 ENCOUNTER — HOSPITAL ENCOUNTER (OUTPATIENT)
Dept: RADIOLOGY | Facility: HOSPITAL | Age: 60
Discharge: HOME | End: 2020-02-21
Attending: INTERNAL MEDICINE
Payer: COMMERCIAL

## 2020-02-21 ENCOUNTER — OFFICE VISIT (OUTPATIENT)
Dept: FAMILY MEDICINE | Facility: CLINIC | Age: 60
End: 2020-02-21
Payer: COMMERCIAL

## 2020-02-21 VITALS
BODY MASS INDEX: 40.71 KG/M2 | DIASTOLIC BLOOD PRESSURE: 86 MMHG | SYSTOLIC BLOOD PRESSURE: 158 MMHG | TEMPERATURE: 98 F | HEART RATE: 94 BPM | HEIGHT: 59 IN | OXYGEN SATURATION: 98 % | RESPIRATION RATE: 20 BRPM | WEIGHT: 201.94 LBS

## 2020-02-21 DIAGNOSIS — I10 ESSENTIAL HYPERTENSION: ICD-10-CM

## 2020-02-21 DIAGNOSIS — E11.65 TYPE 2 DIABETES MELLITUS WITH HYPERGLYCEMIA, WITHOUT LONG-TERM CURRENT USE OF INSULIN: Primary | ICD-10-CM

## 2020-02-21 DIAGNOSIS — E78.5 HYPERLIPIDEMIA LDL GOAL <70: ICD-10-CM

## 2020-02-21 DIAGNOSIS — E55.9 VITAMIN D DEFICIENCY: ICD-10-CM

## 2020-02-21 DIAGNOSIS — N64.4 PAIN OF BOTH BREASTS: ICD-10-CM

## 2020-02-21 DIAGNOSIS — M19.079 ARTHRITIS OF ANKLE: ICD-10-CM

## 2020-02-21 DIAGNOSIS — Z12.11 COLON CANCER SCREENING: ICD-10-CM

## 2020-02-21 DIAGNOSIS — Z12.31 ENCOUNTER FOR SCREENING MAMMOGRAM FOR BREAST CANCER: ICD-10-CM

## 2020-02-21 DIAGNOSIS — Z23 NEED FOR PNEUMOCOCCAL VACCINE: ICD-10-CM

## 2020-02-21 DIAGNOSIS — Z12.39 BREAST CANCER SCREENING: ICD-10-CM

## 2020-02-21 PROCEDURE — 3079F PR MOST RECENT DIASTOLIC BLOOD PRESSURE 80-89 MM HG: ICD-10-PCS | Mod: CPTII,S$GLB,, | Performed by: INTERNAL MEDICINE

## 2020-02-21 PROCEDURE — 3079F DIAST BP 80-89 MM HG: CPT | Mod: CPTII,S$GLB,, | Performed by: INTERNAL MEDICINE

## 2020-02-21 PROCEDURE — 77066 DX MAMMO INCL CAD BI: CPT | Mod: TC

## 2020-02-21 PROCEDURE — 3077F SYST BP >= 140 MM HG: CPT | Mod: CPTII,S$GLB,, | Performed by: INTERNAL MEDICINE

## 2020-02-21 PROCEDURE — 3046F HEMOGLOBIN A1C LEVEL >9.0%: CPT | Mod: CPTII,S$GLB,, | Performed by: INTERNAL MEDICINE

## 2020-02-21 PROCEDURE — 3046F PR MOST RECENT HEMOGLOBIN A1C LEVEL > 9.0%: ICD-10-PCS | Mod: CPTII,S$GLB,, | Performed by: INTERNAL MEDICINE

## 2020-02-21 PROCEDURE — 99999 PR PBB SHADOW E&M-EST. PATIENT-LVL V: CPT | Mod: PBBFAC,,, | Performed by: INTERNAL MEDICINE

## 2020-02-21 PROCEDURE — 77062 BREAST TOMOSYNTHESIS BI: CPT | Mod: 26,,, | Performed by: RADIOLOGY

## 2020-02-21 PROCEDURE — 3077F PR MOST RECENT SYSTOLIC BLOOD PRESSURE >= 140 MM HG: ICD-10-PCS | Mod: CPTII,S$GLB,, | Performed by: INTERNAL MEDICINE

## 2020-02-21 PROCEDURE — 90732 PNEUMOCOCCAL POLYSACCHARIDE VACCINE 23-VALENT =>2YO SQ IM: ICD-10-PCS | Mod: S$GLB,,, | Performed by: INTERNAL MEDICINE

## 2020-02-21 PROCEDURE — 99214 PR OFFICE/OUTPT VISIT, EST, LEVL IV, 30-39 MIN: ICD-10-PCS | Mod: 25,S$GLB,, | Performed by: INTERNAL MEDICINE

## 2020-02-21 PROCEDURE — 90732 PPSV23 VACC 2 YRS+ SUBQ/IM: CPT | Mod: S$GLB,,, | Performed by: INTERNAL MEDICINE

## 2020-02-21 PROCEDURE — 77062 MAMMO DIGITAL DIAGNOSTIC BILAT WITH TOMOSYNTHESIS_CAD: ICD-10-PCS | Mod: 26,,, | Performed by: RADIOLOGY

## 2020-02-21 PROCEDURE — 99999 PR PBB SHADOW E&M-EST. PATIENT-LVL V: ICD-10-PCS | Mod: PBBFAC,,, | Performed by: INTERNAL MEDICINE

## 2020-02-21 PROCEDURE — 3008F BODY MASS INDEX DOCD: CPT | Mod: CPTII,S$GLB,, | Performed by: INTERNAL MEDICINE

## 2020-02-21 PROCEDURE — 77066 MAMMO DIGITAL DIAGNOSTIC BILAT WITH TOMOSYNTHESIS_CAD: ICD-10-PCS | Mod: 26,,, | Performed by: RADIOLOGY

## 2020-02-21 PROCEDURE — 90471 IMMUNIZATION ADMIN: CPT | Mod: S$GLB,,, | Performed by: INTERNAL MEDICINE

## 2020-02-21 PROCEDURE — 77066 DX MAMMO INCL CAD BI: CPT | Mod: 26,,, | Performed by: RADIOLOGY

## 2020-02-21 PROCEDURE — 90471 PNEUMOCOCCAL POLYSACCHARIDE VACCINE 23-VALENT =>2YO SQ IM: ICD-10-PCS | Mod: S$GLB,,, | Performed by: INTERNAL MEDICINE

## 2020-02-21 PROCEDURE — 3008F PR BODY MASS INDEX (BMI) DOCUMENTED: ICD-10-PCS | Mod: CPTII,S$GLB,, | Performed by: INTERNAL MEDICINE

## 2020-02-21 PROCEDURE — 99214 OFFICE O/P EST MOD 30 MIN: CPT | Mod: 25,S$GLB,, | Performed by: INTERNAL MEDICINE

## 2020-02-21 RX ORDER — ATORVASTATIN CALCIUM 40 MG/1
40 TABLET, FILM COATED ORAL NIGHTLY
Qty: 90 TABLET | Refills: 3 | Status: SHIPPED | OUTPATIENT
Start: 2020-02-21 | End: 2021-03-19 | Stop reason: SDUPTHER

## 2020-02-21 RX ORDER — ERGOCALCIFEROL 1.25 MG/1
50000 CAPSULE ORAL
Qty: 12 CAPSULE | Refills: 0 | Status: SHIPPED | OUTPATIENT
Start: 2020-02-21 | End: 2021-03-19 | Stop reason: SDUPTHER

## 2020-02-24 ENCOUNTER — TELEPHONE (OUTPATIENT)
Dept: RADIOLOGY | Facility: HOSPITAL | Age: 60
End: 2020-02-24

## 2020-02-27 ENCOUNTER — TELEPHONE (OUTPATIENT)
Dept: RADIOLOGY | Facility: HOSPITAL | Age: 60
End: 2020-02-27

## 2020-03-19 ENCOUNTER — PATIENT OUTREACH (OUTPATIENT)
Dept: ADMINISTRATIVE | Facility: OTHER | Age: 60
End: 2020-03-19

## 2020-03-19 NOTE — PROGRESS NOTES
Chart reviewed.   Requested updates from Care Everywhere.  Immunizations reconciled.   HM updated.  Optometry appt 03/20/2020

## 2020-03-30 DIAGNOSIS — E11.65 UNCONTROLLED TYPE 2 DIABETES MELLITUS WITH HYPERGLYCEMIA: Primary | ICD-10-CM

## 2020-03-30 NOTE — TELEPHONE ENCOUNTER
----- Message from Tiffany Bullard sent at 3/30/2020  2:27 PM CDT -----  Contact: Self   Type: RX Refill Request    Who Called:  Self     Have you contacted your pharmacy: no     Refill or New Rx: Refill     RX Name and Strength:  metformin (GLUCOPHAGE) 1000 MG tablet    Patient states there is another diabetes a medication she takes but does not know the name of it and need a refill on it as well     Preferred Pharmacy with phone number:[id number] [place] [address] [phone] (Phone)  [phone] (Fax)        Local or Mail Order: Local     Ordering Provider:unknown     Would the patient rather a call back or a response via [facility]?  Call     Best Call Back Number:[phone]

## 2020-03-31 RX ORDER — METFORMIN HYDROCHLORIDE 1000 MG/1
1000 TABLET ORAL 2 TIMES DAILY WITH MEALS
Qty: 60 TABLET | Refills: 2 | Status: SHIPPED | OUTPATIENT
Start: 2020-03-31 | End: 2021-03-19

## 2020-04-17 ENCOUNTER — TELEPHONE (OUTPATIENT)
Dept: FAMILY MEDICINE | Facility: CLINIC | Age: 60
End: 2020-04-17

## 2020-04-17 NOTE — TELEPHONE ENCOUNTER
Attempt made to contact patient, voicemail full. Patient did not answer for audio virtual visit that was scheduled

## 2020-04-17 NOTE — TELEPHONE ENCOUNTER
----- Message from Neetu Welch sent at 4/17/2020  3:21 PM CDT -----  Contact: Self  Type: Patient Call Back    Who called:Joanie    What is the request in detail:Patient called to check status on Rx for UTI. Please call to advise    Can the clinic reply by [facility]?    Would the patient rather a call back or a response via [facility]? Call    Best call back number:[phone]

## 2020-04-20 ENCOUNTER — TELEPHONE (OUTPATIENT)
Dept: FAMILY MEDICINE | Facility: CLINIC | Age: 60
End: 2020-04-20

## 2020-04-20 NOTE — TELEPHONE ENCOUNTER
----- Message from Salma Bobo sent at 4/20/2020 10:42 AM CDT -----  Contact: Joanie [phone]  Type: Patient Call Back    Who called:Joanie    What is the request in detail: The patient is returning a call to the staff from last week in regards to getting medication for a UTI    Can the clinic reply by [facility]?no    Would the patient rather a call back or a response via [facility]? Call back     Best call back number:[phone]

## 2020-04-20 NOTE — TELEPHONE ENCOUNTER
Pt missed audio visit last week; I rescheduled her for Wednesday but she is asking if something can be called in for UTI, her only symptom is itching but she states she knows it is a UTI

## 2020-04-21 NOTE — TELEPHONE ENCOUNTER
Based on her uncontrolled DM and itching as only symptom, I would rather think that she has a yeast infection . Could send a script for Diflucan if she wants to. Thanks

## 2020-04-22 ENCOUNTER — OFFICE VISIT (OUTPATIENT)
Dept: FAMILY MEDICINE | Facility: CLINIC | Age: 60
End: 2020-04-22
Payer: COMMERCIAL

## 2020-04-22 DIAGNOSIS — E11.65 TYPE 2 DIABETES MELLITUS WITH HYPERGLYCEMIA, WITHOUT LONG-TERM CURRENT USE OF INSULIN: ICD-10-CM

## 2020-04-22 DIAGNOSIS — B37.31 VAGINAL CANDIDIASIS: Primary | ICD-10-CM

## 2020-04-22 PROBLEM — Z00.00 ROUTINE GENERAL MEDICAL EXAMINATION AT A HEALTH CARE FACILITY: Status: RESOLVED | Noted: 2020-02-14 | Resolved: 2020-04-22

## 2020-04-22 PROCEDURE — 99213 PR OFFICE/OUTPT VISIT, EST, LEVL III, 20-29 MIN: ICD-10-PCS | Mod: 95,,, | Performed by: INTERNAL MEDICINE

## 2020-04-22 PROCEDURE — 3046F PR MOST RECENT HEMOGLOBIN A1C LEVEL > 9.0%: ICD-10-PCS | Mod: CPTII,,, | Performed by: INTERNAL MEDICINE

## 2020-04-22 PROCEDURE — 99213 OFFICE O/P EST LOW 20 MIN: CPT | Mod: 95,,, | Performed by: INTERNAL MEDICINE

## 2020-04-22 PROCEDURE — 3046F HEMOGLOBIN A1C LEVEL >9.0%: CPT | Mod: CPTII,,, | Performed by: INTERNAL MEDICINE

## 2020-04-22 RX ORDER — FLUCONAZOLE 150 MG/1
150 TABLET ORAL DAILY PRN
Qty: 2 TABLET | Refills: 0 | Status: SHIPPED | OUTPATIENT
Start: 2020-04-22 | End: 2021-03-19

## 2020-04-22 NOTE — PROGRESS NOTES
" Patient ID: Joanie Chandler is a pleasant 59 y.o. Black or  female.    Chief Complaint: Vaginitis      Patient is a new pt to me but established pt from .     This visit is a Telemedicine Video Visit due to the COVID-19 epidemics.    The patient location is: home  The chief complaint leading to consultation is: vaginal and vulvar itching  Visit type: audio only  Total time spent with patient: 11 min  Each patient to whom he or she provides medical services by telemedicine is:  (1) informed of the relationship between the physician and patient and the respective role of any other health care provider with respect to management of the patient; and (2) notified that he or she may decline to receive medical services by telemedicine and may withdraw from such care at any time.      HPI      I saw Mr. Chandler last on 2/21/2020, see my last notes. DM was uncontrolled and compliance questionable, pt did not f-up as was supposed to.  She reports a 4-5 days H/O vulvar and vaginal itching, causing her to scratch herself. She denies discharge or dysuria. She is sure she has an UTI but never had one before.   She tried to use Vagisil cream with no success.  She does not check her BS so often. She reports values that are   "in the 100s" but cannot tell me when she checked last.        Patient Active Problem List   Diagnosis    Type 2 diabetes mellitus with hyperglycemia, without long-term current use of insulin    Essential hypertension    Pain of both breasts    Heart murmur    Arthritis of ankle    BMI 40.0-44.9, adult    Vitamin D deficiency    Hyperlipidemia LDL goal <70    Vaginal candidiasis        Review of Systems   Constitutional: Negative.    HENT: Negative.    Eyes: Negative.    Respiratory: Negative.    Cardiovascular: Negative.    Genitourinary: Positive for vaginal pain.       Objective:      Physical Exam    There were no vitals filed for this visit.  There is no height or weight on file " to calculate BMI.    RESULTS: Reviewed labs from last 6 months    Assessment:       1. Vaginal candidiasis    2. Type 2 diabetes mellitus with hyperglycemia, without long-term current use of insulin        Plan:   Joanie was seen today for vaginitis.    Diagnoses and all orders for this visit:    Vaginal candidiasis  -     fluconazole (DIFLUCAN) 150 MG Tab; Take 1 tablet (150 mg total) by mouth daily as needed. Take one pill, if not better after 2 days, can take the second one.    I could only do an audio visit as Clinic closed due to COVID, and pt does not have Portal access. Based on symptoms and uncontrolled DM, she seems to have candidiasis and I will treat empirically with Diflucan. I told her to call me back if not better or any concern.    Type 2 diabetes mellitus with hyperglycemia, without long-term current use of insulin    Last A1C 10.3 %. Compliance poor. I will have her come to do lab work as soon as open here (in 2 weeks probably) and will see her back beginning of June. Meanwhile, I asked her to check her BS at least once a day and ideally twice.    Follow up if symptoms worsen or fail to improve.

## 2020-04-23 ENCOUNTER — TELEPHONE (OUTPATIENT)
Dept: FAMILY MEDICINE | Facility: CLINIC | Age: 60
End: 2020-04-23

## 2020-04-23 NOTE — TELEPHONE ENCOUNTER
----- Message from Leni Castanon MD sent at 4/22/2020  5:28 PM CDT -----  Possible to remind pt to do lab work around 5/22/2020 (orders placed, the lab should be open here at that date) and to schedule an appt with me after 6/10/2020 (ideally in the Clinic if open?).  Thanks!

## 2020-05-14 ENCOUNTER — PATIENT OUTREACH (OUTPATIENT)
Dept: ADMINISTRATIVE | Facility: HOSPITAL | Age: 60
End: 2020-05-14

## 2020-06-16 ENCOUNTER — TELEPHONE (OUTPATIENT)
Dept: OPTOMETRY | Facility: CLINIC | Age: 60
End: 2020-06-16

## 2020-06-16 NOTE — TELEPHONE ENCOUNTER
[employer] benefits as of 20 :    Practitioner NPI  [id number]  Practitioner Name  Dr.GENNY POTTER  Practitioner Tax ID  [id number]  Office Address: [address] [place] [address], [address]  Shipping Address: [address] [place] [address], [address]  Change Practitioner  Member Information  Member Name  MANDY SCHROEDER  Member   1960  Member ID  [id number]  Relationship  Member  Group/Sub Group  [facility]  Plan Name/Plan Prefix  MEMBER/02N  Service Record Form  Select Services Below  Exam  Frames  Contact Lens  [employer] Lens  Benefit Details  Service Date: 2020  Print Benefit Details  printer  BENEFIT MESSAGES     CURRENTLY ELIGIBLE, FOR, EXAM, 1 PAIR OF, GLASSES OR CONTACTS.  Plan Covered Benefits  Category Description Frequency   Copay Amount Allowed Per Period Available Next Available Date Allowance Remaining  Exam  Routine  Exam Every 24 months No Copay -- Yes

## 2020-06-18 ENCOUNTER — PATIENT OUTREACH (OUTPATIENT)
Dept: ADMINISTRATIVE | Facility: OTHER | Age: 60
End: 2020-06-18

## 2020-06-25 ENCOUNTER — PATIENT OUTREACH (OUTPATIENT)
Dept: ADMINISTRATIVE | Facility: HOSPITAL | Age: 60
End: 2020-06-25

## 2020-07-21 ENCOUNTER — PATIENT OUTREACH (OUTPATIENT)
Dept: ADMINISTRATIVE | Facility: OTHER | Age: 60
End: 2020-07-21

## 2020-07-21 ENCOUNTER — TELEPHONE (OUTPATIENT)
Dept: ENDOCRINOLOGY | Facility: CLINIC | Age: 60
End: 2020-07-21

## 2020-07-21 NOTE — PROGRESS NOTES
Requested updates within Care Everywhere.  Patient's chart was reviewed for overdue CAROLE topics.  Immunizations reconciled.

## 2020-07-21 NOTE — TELEPHONE ENCOUNTER
Tried contacting the pt to confirm her appt tomorrow at 3PM. Also wanted to offer her an earlier time if she would like 9:30AM. If not she can keep her 3PM appt. The pt did not answer and does not have a voicemail.

## 2020-08-05 ENCOUNTER — PATIENT OUTREACH (OUTPATIENT)
Dept: ADMINISTRATIVE | Facility: HOSPITAL | Age: 60
End: 2020-08-05

## 2020-11-11 ENCOUNTER — PATIENT OUTREACH (OUTPATIENT)
Dept: ADMINISTRATIVE | Facility: HOSPITAL | Age: 60
End: 2020-11-11

## 2021-01-04 ENCOUNTER — TELEPHONE (OUTPATIENT)
Dept: ADMINISTRATIVE | Facility: HOSPITAL | Age: 61
End: 2021-01-04

## 2021-02-24 DIAGNOSIS — Z12.11 COLON CANCER SCREENING: ICD-10-CM

## 2021-03-01 ENCOUNTER — PATIENT OUTREACH (OUTPATIENT)
Dept: ADMINISTRATIVE | Facility: HOSPITAL | Age: 61
End: 2021-03-01

## 2021-03-11 ENCOUNTER — LAB VISIT (OUTPATIENT)
Dept: LAB | Facility: HOSPITAL | Age: 61
End: 2021-03-11
Attending: INTERNAL MEDICINE
Payer: COMMERCIAL

## 2021-03-11 ENCOUNTER — OFFICE VISIT (OUTPATIENT)
Dept: FAMILY MEDICINE | Facility: CLINIC | Age: 61
End: 2021-03-11
Payer: COMMERCIAL

## 2021-03-11 VITALS
DIASTOLIC BLOOD PRESSURE: 80 MMHG | WEIGHT: 170.63 LBS | OXYGEN SATURATION: 96 % | RESPIRATION RATE: 16 BRPM | BODY MASS INDEX: 34.4 KG/M2 | HEIGHT: 59 IN | HEART RATE: 89 BPM | SYSTOLIC BLOOD PRESSURE: 124 MMHG | TEMPERATURE: 98 F

## 2021-03-11 DIAGNOSIS — E66.09 CLASS 1 OBESITY DUE TO EXCESS CALORIES WITH SERIOUS COMORBIDITY AND BODY MASS INDEX (BMI) OF 34.0 TO 34.9 IN ADULT: ICD-10-CM

## 2021-03-11 DIAGNOSIS — E55.9 VITAMIN D DEFICIENCY: ICD-10-CM

## 2021-03-11 DIAGNOSIS — E11.65 TYPE 2 DIABETES MELLITUS WITH HYPERGLYCEMIA, WITHOUT LONG-TERM CURRENT USE OF INSULIN: ICD-10-CM

## 2021-03-11 DIAGNOSIS — E78.5 HYPERLIPIDEMIA LDL GOAL <70: ICD-10-CM

## 2021-03-11 DIAGNOSIS — E11.65 TYPE 2 DIABETES MELLITUS WITH HYPERGLYCEMIA, WITHOUT LONG-TERM CURRENT USE OF INSULIN: Primary | ICD-10-CM

## 2021-03-11 DIAGNOSIS — I10 ESSENTIAL HYPERTENSION: ICD-10-CM

## 2021-03-11 DIAGNOSIS — Z12.31 ENCOUNTER FOR SCREENING MAMMOGRAM FOR MALIGNANT NEOPLASM OF BREAST: ICD-10-CM

## 2021-03-11 DIAGNOSIS — G47.00 INSOMNIA, UNSPECIFIED TYPE: ICD-10-CM

## 2021-03-11 LAB
25(OH)D3+25(OH)D2 SERPL-MCNC: 15 NG/ML (ref 30–96)
ALBUMIN/CREAT UR: 4 UG/MG (ref 0–30)
ANION GAP SERPL CALC-SCNC: 10 MMOL/L (ref 8–16)
BASOPHILS # BLD AUTO: 0.02 K/UL (ref 0–0.2)
BASOPHILS NFR BLD: 0.3 % (ref 0–1.9)
BUN SERPL-MCNC: 9 MG/DL (ref 6–20)
CALCIUM SERPL-MCNC: 9.9 MG/DL (ref 8.7–10.5)
CHLORIDE SERPL-SCNC: 102 MMOL/L (ref 95–110)
CHOLEST SERPL-MCNC: 221 MG/DL (ref 120–199)
CHOLEST/HDLC SERPL: 3.9 {RATIO} (ref 2–5)
CO2 SERPL-SCNC: 27 MMOL/L (ref 23–29)
CREAT SERPL-MCNC: 0.8 MG/DL (ref 0.5–1.4)
CREAT UR-MCNC: 150 MG/DL (ref 15–325)
DIFFERENTIAL METHOD: ABNORMAL
EOSINOPHIL # BLD AUTO: 0 K/UL (ref 0–0.5)
EOSINOPHIL NFR BLD: 0.4 % (ref 0–8)
ERYTHROCYTE [DISTWIDTH] IN BLOOD BY AUTOMATED COUNT: 12.4 % (ref 11.5–14.5)
EST. GFR  (AFRICAN AMERICAN): >60 ML/MIN/1.73 M^2
EST. GFR  (NON AFRICAN AMERICAN): >60 ML/MIN/1.73 M^2
ESTIMATED AVG GLUCOSE: 298 MG/DL (ref 68–131)
GLUCOSE SERPL-MCNC: 258 MG/DL (ref 70–110)
HBA1C MFR BLD: 12 % (ref 4–5.6)
HCT VFR BLD AUTO: 46.2 % (ref 37–48.5)
HDLC SERPL-MCNC: 56 MG/DL (ref 40–75)
HDLC SERPL: 25.3 % (ref 20–50)
HGB BLD-MCNC: 14.1 G/DL (ref 12–16)
IMM GRANULOCYTES # BLD AUTO: 0.02 K/UL (ref 0–0.04)
IMM GRANULOCYTES NFR BLD AUTO: 0.3 % (ref 0–0.5)
LDLC SERPL CALC-MCNC: 143.2 MG/DL (ref 63–159)
LYMPHOCYTES # BLD AUTO: 2 K/UL (ref 1–4.8)
LYMPHOCYTES NFR BLD: 25.4 % (ref 18–48)
MCH RBC QN AUTO: 27.5 PG (ref 27–31)
MCHC RBC AUTO-ENTMCNC: 30.5 G/DL (ref 32–36)
MCV RBC AUTO: 90 FL (ref 82–98)
MICROALBUMIN UR DL<=1MG/L-MCNC: 6 UG/ML
MONOCYTES # BLD AUTO: 0.5 K/UL (ref 0.3–1)
MONOCYTES NFR BLD: 6.7 % (ref 4–15)
NEUTROPHILS # BLD AUTO: 5.3 K/UL (ref 1.8–7.7)
NEUTROPHILS NFR BLD: 66.9 % (ref 38–73)
NONHDLC SERPL-MCNC: 165 MG/DL
NRBC BLD-RTO: 0 /100 WBC
PLATELET # BLD AUTO: 242 K/UL (ref 150–350)
PMV BLD AUTO: 12.4 FL (ref 9.2–12.9)
POTASSIUM SERPL-SCNC: 4.3 MMOL/L (ref 3.5–5.1)
RBC # BLD AUTO: 5.12 M/UL (ref 4–5.4)
SODIUM SERPL-SCNC: 139 MMOL/L (ref 136–145)
TRIGL SERPL-MCNC: 109 MG/DL (ref 30–150)
TSH SERPL DL<=0.005 MIU/L-ACNC: 0.74 UIU/ML (ref 0.4–4)
WBC # BLD AUTO: 7.94 K/UL (ref 3.9–12.7)

## 2021-03-11 PROCEDURE — 83036 HEMOGLOBIN GLYCOSYLATED A1C: CPT | Performed by: INTERNAL MEDICINE

## 2021-03-11 PROCEDURE — 99999 PR PBB SHADOW E&M-EST. PATIENT-LVL IV: CPT | Mod: PBBFAC,,, | Performed by: INTERNAL MEDICINE

## 2021-03-11 PROCEDURE — 80061 LIPID PANEL: CPT | Performed by: INTERNAL MEDICINE

## 2021-03-11 PROCEDURE — 99999 PR PBB SHADOW E&M-EST. PATIENT-LVL IV: ICD-10-PCS | Mod: PBBFAC,,, | Performed by: INTERNAL MEDICINE

## 2021-03-11 PROCEDURE — 99214 PR OFFICE/OUTPT VISIT, EST, LEVL IV, 30-39 MIN: ICD-10-PCS | Mod: S$GLB,,, | Performed by: INTERNAL MEDICINE

## 2021-03-11 PROCEDURE — 80048 BASIC METABOLIC PNL TOTAL CA: CPT | Performed by: INTERNAL MEDICINE

## 2021-03-11 PROCEDURE — 3079F DIAST BP 80-89 MM HG: CPT | Mod: CPTII,S$GLB,, | Performed by: INTERNAL MEDICINE

## 2021-03-11 PROCEDURE — 82306 VITAMIN D 25 HYDROXY: CPT | Performed by: INTERNAL MEDICINE

## 2021-03-11 PROCEDURE — 82570 ASSAY OF URINE CREATININE: CPT | Performed by: INTERNAL MEDICINE

## 2021-03-11 PROCEDURE — 3074F PR MOST RECENT SYSTOLIC BLOOD PRESSURE < 130 MM HG: ICD-10-PCS | Mod: CPTII,S$GLB,, | Performed by: INTERNAL MEDICINE

## 2021-03-11 PROCEDURE — 84443 ASSAY THYROID STIM HORMONE: CPT | Performed by: INTERNAL MEDICINE

## 2021-03-11 PROCEDURE — 85025 COMPLETE CBC W/AUTO DIFF WBC: CPT | Performed by: INTERNAL MEDICINE

## 2021-03-11 PROCEDURE — 3008F BODY MASS INDEX DOCD: CPT | Mod: CPTII,S$GLB,, | Performed by: INTERNAL MEDICINE

## 2021-03-11 PROCEDURE — 1126F AMNT PAIN NOTED NONE PRSNT: CPT | Mod: S$GLB,,, | Performed by: INTERNAL MEDICINE

## 2021-03-11 PROCEDURE — 36415 COLL VENOUS BLD VENIPUNCTURE: CPT | Mod: PO | Performed by: INTERNAL MEDICINE

## 2021-03-11 PROCEDURE — 1126F PR PAIN SEVERITY QUANTIFIED, NO PAIN PRESENT: ICD-10-PCS | Mod: S$GLB,,, | Performed by: INTERNAL MEDICINE

## 2021-03-11 PROCEDURE — 3079F PR MOST RECENT DIASTOLIC BLOOD PRESSURE 80-89 MM HG: ICD-10-PCS | Mod: CPTII,S$GLB,, | Performed by: INTERNAL MEDICINE

## 2021-03-11 PROCEDURE — 3008F PR BODY MASS INDEX (BMI) DOCUMENTED: ICD-10-PCS | Mod: CPTII,S$GLB,, | Performed by: INTERNAL MEDICINE

## 2021-03-11 PROCEDURE — 99214 OFFICE O/P EST MOD 30 MIN: CPT | Mod: S$GLB,,, | Performed by: INTERNAL MEDICINE

## 2021-03-11 PROCEDURE — 3074F SYST BP LT 130 MM HG: CPT | Mod: CPTII,S$GLB,, | Performed by: INTERNAL MEDICINE

## 2021-03-11 PROCEDURE — 82043 UR ALBUMIN QUANTITATIVE: CPT | Performed by: INTERNAL MEDICINE

## 2021-03-19 ENCOUNTER — HOSPITAL ENCOUNTER (OUTPATIENT)
Dept: RADIOLOGY | Facility: HOSPITAL | Age: 61
Discharge: HOME | End: 2021-03-19
Attending: INTERNAL MEDICINE
Payer: COMMERCIAL

## 2021-03-19 ENCOUNTER — OFFICE VISIT (OUTPATIENT)
Dept: FAMILY MEDICINE | Facility: CLINIC | Age: 61
End: 2021-03-19
Payer: COMMERCIAL

## 2021-03-19 VITALS
BODY MASS INDEX: 37.83 KG/M2 | RESPIRATION RATE: 16 BRPM | OXYGEN SATURATION: 97 % | SYSTOLIC BLOOD PRESSURE: 132 MMHG | TEMPERATURE: 98 F | HEART RATE: 86 BPM | WEIGHT: 187.63 LBS | HEIGHT: 59 IN | DIASTOLIC BLOOD PRESSURE: 86 MMHG

## 2021-03-19 DIAGNOSIS — I10 ESSENTIAL HYPERTENSION: ICD-10-CM

## 2021-03-19 DIAGNOSIS — Z12.31 ENCOUNTER FOR SCREENING MAMMOGRAM FOR MALIGNANT NEOPLASM OF BREAST: ICD-10-CM

## 2021-03-19 DIAGNOSIS — E78.5 HYPERLIPIDEMIA LDL GOAL <70: ICD-10-CM

## 2021-03-19 DIAGNOSIS — J30.1 SEASONAL ALLERGIC RHINITIS DUE TO POLLEN: ICD-10-CM

## 2021-03-19 DIAGNOSIS — E66.09 CLASS 1 OBESITY DUE TO EXCESS CALORIES WITH SERIOUS COMORBIDITY AND BODY MASS INDEX (BMI) OF 34.0 TO 34.9 IN ADULT: ICD-10-CM

## 2021-03-19 DIAGNOSIS — E11.65 TYPE 2 DIABETES MELLITUS WITH HYPERGLYCEMIA, WITHOUT LONG-TERM CURRENT USE OF INSULIN: Primary | ICD-10-CM

## 2021-03-19 DIAGNOSIS — Z12.39 BREAST CANCER SCREENING: ICD-10-CM

## 2021-03-19 DIAGNOSIS — E55.9 VITAMIN D DEFICIENCY: ICD-10-CM

## 2021-03-19 PROCEDURE — 99999 PR PBB SHADOW E&M-EST. PATIENT-LVL V: ICD-10-PCS | Mod: PBBFAC,,, | Performed by: INTERNAL MEDICINE

## 2021-03-19 PROCEDURE — 77067 MAMMO DIGITAL SCREENING BILAT WITH TOMO: ICD-10-PCS | Mod: 26,,, | Performed by: RADIOLOGY

## 2021-03-19 PROCEDURE — 77063 MAMMO DIGITAL SCREENING BILAT WITH TOMO: ICD-10-PCS | Mod: 26,,, | Performed by: RADIOLOGY

## 2021-03-19 PROCEDURE — 1126F PR PAIN SEVERITY QUANTIFIED, NO PAIN PRESENT: ICD-10-PCS | Mod: S$GLB,,, | Performed by: INTERNAL MEDICINE

## 2021-03-19 PROCEDURE — 1126F AMNT PAIN NOTED NONE PRSNT: CPT | Mod: S$GLB,,, | Performed by: INTERNAL MEDICINE

## 2021-03-19 PROCEDURE — 99999 PR PBB SHADOW E&M-EST. PATIENT-LVL V: CPT | Mod: PBBFAC,,, | Performed by: INTERNAL MEDICINE

## 2021-03-19 PROCEDURE — 3079F PR MOST RECENT DIASTOLIC BLOOD PRESSURE 80-89 MM HG: ICD-10-PCS | Mod: CPTII,S$GLB,, | Performed by: INTERNAL MEDICINE

## 2021-03-19 PROCEDURE — 77067 SCR MAMMO BI INCL CAD: CPT | Mod: 26,,, | Performed by: RADIOLOGY

## 2021-03-19 PROCEDURE — 3075F SYST BP GE 130 - 139MM HG: CPT | Mod: CPTII,S$GLB,, | Performed by: INTERNAL MEDICINE

## 2021-03-19 PROCEDURE — 3008F PR BODY MASS INDEX (BMI) DOCUMENTED: ICD-10-PCS | Mod: CPTII,S$GLB,, | Performed by: INTERNAL MEDICINE

## 2021-03-19 PROCEDURE — 3075F PR MOST RECENT SYSTOLIC BLOOD PRESS GE 130-139MM HG: ICD-10-PCS | Mod: CPTII,S$GLB,, | Performed by: INTERNAL MEDICINE

## 2021-03-19 PROCEDURE — 77063 BREAST TOMOSYNTHESIS BI: CPT | Mod: 26,,, | Performed by: RADIOLOGY

## 2021-03-19 PROCEDURE — 3079F DIAST BP 80-89 MM HG: CPT | Mod: CPTII,S$GLB,, | Performed by: INTERNAL MEDICINE

## 2021-03-19 PROCEDURE — 3046F PR MOST RECENT HEMOGLOBIN A1C LEVEL > 9.0%: ICD-10-PCS | Mod: CPTII,S$GLB,, | Performed by: INTERNAL MEDICINE

## 2021-03-19 PROCEDURE — 99214 PR OFFICE/OUTPT VISIT, EST, LEVL IV, 30-39 MIN: ICD-10-PCS | Mod: S$GLB,,, | Performed by: INTERNAL MEDICINE

## 2021-03-19 PROCEDURE — 3046F HEMOGLOBIN A1C LEVEL >9.0%: CPT | Mod: CPTII,S$GLB,, | Performed by: INTERNAL MEDICINE

## 2021-03-19 PROCEDURE — 77067 SCR MAMMO BI INCL CAD: CPT | Mod: TC

## 2021-03-19 PROCEDURE — 3008F BODY MASS INDEX DOCD: CPT | Mod: CPTII,S$GLB,, | Performed by: INTERNAL MEDICINE

## 2021-03-19 PROCEDURE — 99214 OFFICE O/P EST MOD 30 MIN: CPT | Mod: S$GLB,,, | Performed by: INTERNAL MEDICINE

## 2021-03-19 RX ORDER — METFORMIN HYDROCHLORIDE 500 MG/1
TABLET ORAL
Qty: 120 TABLET | Refills: 3 | Status: SHIPPED | OUTPATIENT
Start: 2021-03-19 | End: 2021-04-08

## 2021-03-19 RX ORDER — ATORVASTATIN CALCIUM 40 MG/1
40 TABLET, FILM COATED ORAL NIGHTLY
Qty: 90 TABLET | Refills: 3 | Status: SHIPPED | OUTPATIENT
Start: 2021-03-19 | End: 2022-09-29 | Stop reason: SDUPTHER

## 2021-03-19 RX ORDER — FLUTICASONE PROPIONATE 50 MCG
1 SPRAY, SUSPENSION (ML) NASAL DAILY
Qty: 16 G | Refills: 0 | Status: SHIPPED | OUTPATIENT
Start: 2021-03-19 | End: 2023-04-11 | Stop reason: SDUPTHER

## 2021-03-19 RX ORDER — ERGOCALCIFEROL 1.25 MG/1
50000 CAPSULE ORAL
Qty: 12 CAPSULE | Refills: 0 | Status: SHIPPED | OUTPATIENT
Start: 2021-03-19 | End: 2023-04-11

## 2021-03-19 RX ORDER — GLIPIZIDE 10 MG/1
TABLET ORAL
Qty: 60 TABLET | Refills: 2 | Status: SHIPPED | OUTPATIENT
Start: 2021-03-19 | End: 2022-10-03 | Stop reason: SDUPTHER

## 2021-03-19 RX ORDER — VALSARTAN 80 MG/1
80 TABLET ORAL DAILY
Qty: 90 TABLET | Refills: 2 | Status: SHIPPED | OUTPATIENT
Start: 2021-03-19 | End: 2022-09-29 | Stop reason: SDUPTHER

## 2021-04-06 ENCOUNTER — PATIENT OUTREACH (OUTPATIENT)
Dept: ADMINISTRATIVE | Facility: OTHER | Age: 61
End: 2021-04-06

## 2021-04-08 ENCOUNTER — OFFICE VISIT (OUTPATIENT)
Dept: ENDOCRINOLOGY | Facility: CLINIC | Age: 61
End: 2021-04-08
Payer: COMMERCIAL

## 2021-04-08 VITALS
HEART RATE: 89 BPM | BODY MASS INDEX: 37.59 KG/M2 | DIASTOLIC BLOOD PRESSURE: 91 MMHG | WEIGHT: 186.13 LBS | TEMPERATURE: 98 F | SYSTOLIC BLOOD PRESSURE: 159 MMHG

## 2021-04-08 DIAGNOSIS — E11.65 TYPE 2 DIABETES MELLITUS WITH HYPERGLYCEMIA, WITHOUT LONG-TERM CURRENT USE OF INSULIN: ICD-10-CM

## 2021-04-08 DIAGNOSIS — R73.09 HEMOGLOBIN A1C GREATER THAN 9%, INDICATING POOR DIABETIC CONTROL: ICD-10-CM

## 2021-04-08 DIAGNOSIS — E78.5 HYPERLIPIDEMIA LDL GOAL <70: ICD-10-CM

## 2021-04-08 DIAGNOSIS — E11.65 UNCONTROLLED TYPE 2 DIABETES MELLITUS WITH HYPERGLYCEMIA: Primary | ICD-10-CM

## 2021-04-08 DIAGNOSIS — I10 ESSENTIAL HYPERTENSION: ICD-10-CM

## 2021-04-08 LAB — GLUCOSE SERPL-MCNC: 155 MG/DL (ref 70–110)

## 2021-04-08 PROCEDURE — 99999 PR PBB SHADOW E&M-EST. PATIENT-LVL IV: ICD-10-PCS | Mod: PBBFAC,,, | Performed by: HOSPITALIST

## 2021-04-08 PROCEDURE — 99204 PR OFFICE/OUTPT VISIT, NEW, LEVL IV, 45-59 MIN: ICD-10-PCS | Mod: S$GLB,,, | Performed by: HOSPITALIST

## 2021-04-08 PROCEDURE — 1126F PR PAIN SEVERITY QUANTIFIED, NO PAIN PRESENT: ICD-10-PCS | Mod: S$GLB,,, | Performed by: HOSPITALIST

## 2021-04-08 PROCEDURE — 99204 OFFICE O/P NEW MOD 45 MIN: CPT | Mod: S$GLB,,, | Performed by: HOSPITALIST

## 2021-04-08 PROCEDURE — 3008F PR BODY MASS INDEX (BMI) DOCUMENTED: ICD-10-PCS | Mod: CPTII,S$GLB,, | Performed by: HOSPITALIST

## 2021-04-08 PROCEDURE — 99999 PR PBB SHADOW E&M-EST. PATIENT-LVL IV: CPT | Mod: PBBFAC,,, | Performed by: HOSPITALIST

## 2021-04-08 PROCEDURE — 82962 GLUCOSE BLOOD TEST: CPT | Mod: S$GLB,,, | Performed by: HOSPITALIST

## 2021-04-08 PROCEDURE — 3046F HEMOGLOBIN A1C LEVEL >9.0%: CPT | Mod: CPTII,S$GLB,, | Performed by: HOSPITALIST

## 2021-04-08 PROCEDURE — 82962 POCT GLUCOSE, HAND-HELD DEVICE: ICD-10-PCS | Mod: S$GLB,,, | Performed by: HOSPITALIST

## 2021-04-08 PROCEDURE — 1126F AMNT PAIN NOTED NONE PRSNT: CPT | Mod: S$GLB,,, | Performed by: HOSPITALIST

## 2021-04-08 PROCEDURE — 3008F BODY MASS INDEX DOCD: CPT | Mod: CPTII,S$GLB,, | Performed by: HOSPITALIST

## 2021-04-08 PROCEDURE — 3046F PR MOST RECENT HEMOGLOBIN A1C LEVEL > 9.0%: ICD-10-PCS | Mod: CPTII,S$GLB,, | Performed by: HOSPITALIST

## 2021-04-08 RX ORDER — INSULIN PUMP SYRINGE, 3 ML
EACH MISCELLANEOUS
Qty: 1 EACH | Refills: 0 | Status: SHIPPED | OUTPATIENT
Start: 2021-04-08 | End: 2022-10-19 | Stop reason: SDUPTHER

## 2021-04-08 RX ORDER — LANCING DEVICE
EACH MISCELLANEOUS
Qty: 1 EACH | Refills: 0 | Status: SHIPPED | OUTPATIENT
Start: 2021-04-08

## 2021-04-08 RX ORDER — METFORMIN HYDROCHLORIDE 500 MG/1
500 TABLET, EXTENDED RELEASE ORAL 2 TIMES DAILY WITH MEALS
Qty: 180 TABLET | Refills: 3 | Status: SHIPPED | OUTPATIENT
Start: 2021-04-08 | End: 2022-09-09 | Stop reason: SDUPTHER

## 2021-04-08 RX ORDER — LANCETS
EACH MISCELLANEOUS
Qty: 200 EACH | Refills: 11 | Status: SHIPPED | OUTPATIENT
Start: 2021-04-08 | End: 2022-12-07 | Stop reason: SDUPTHER

## 2021-05-24 ENCOUNTER — PATIENT OUTREACH (OUTPATIENT)
Dept: ADMINISTRATIVE | Facility: OTHER | Age: 61
End: 2021-05-24

## 2021-06-22 ENCOUNTER — TELEPHONE (OUTPATIENT)
Dept: FAMILY MEDICINE | Facility: CLINIC | Age: 61
End: 2021-06-22

## 2021-06-23 DIAGNOSIS — E11.9 TYPE 2 DIABETES MELLITUS WITHOUT COMPLICATION: ICD-10-CM

## 2021-10-04 ENCOUNTER — TELEPHONE (OUTPATIENT)
Dept: FAMILY MEDICINE | Facility: CLINIC | Age: 61
End: 2021-10-04

## 2021-10-04 DIAGNOSIS — E11.65 TYPE 2 DIABETES MELLITUS WITH HYPERGLYCEMIA, WITHOUT LONG-TERM CURRENT USE OF INSULIN: Primary | ICD-10-CM

## 2021-12-09 ENCOUNTER — PATIENT OUTREACH (OUTPATIENT)
Dept: ADMINISTRATIVE | Facility: OTHER | Age: 61
End: 2021-12-09
Payer: COMMERCIAL

## 2021-12-10 ENCOUNTER — OFFICE VISIT (OUTPATIENT)
Dept: OPTOMETRY | Facility: CLINIC | Age: 61
End: 2021-12-10
Payer: COMMERCIAL

## 2021-12-10 DIAGNOSIS — H52.7 REFRACTIVE ERROR: ICD-10-CM

## 2021-12-10 DIAGNOSIS — Z01.00 DIABETIC EYE EXAM: Primary | ICD-10-CM

## 2021-12-10 DIAGNOSIS — E11.9 DIABETIC EYE EXAM: Primary | ICD-10-CM

## 2021-12-10 PROCEDURE — 92004 COMPRE OPH EXAM NEW PT 1/>: CPT | Mod: S$GLB,,, | Performed by: OPTOMETRIST

## 2021-12-10 PROCEDURE — 99999 PR PBB SHADOW E&M-EST. PATIENT-LVL III: ICD-10-PCS | Mod: PBBFAC,,, | Performed by: OPTOMETRIST

## 2021-12-10 PROCEDURE — 99999 PR PBB SHADOW E&M-EST. PATIENT-LVL III: CPT | Mod: PBBFAC,,, | Performed by: OPTOMETRIST

## 2021-12-10 PROCEDURE — 92015 DETERMINE REFRACTIVE STATE: CPT | Mod: S$GLB,,, | Performed by: OPTOMETRIST

## 2021-12-10 PROCEDURE — 92004 PR EYE EXAM, NEW PATIENT,COMPREHESV: ICD-10-PCS | Mod: S$GLB,,, | Performed by: OPTOMETRIST

## 2021-12-10 PROCEDURE — 92015 PR REFRACTION: ICD-10-PCS | Mod: S$GLB,,, | Performed by: OPTOMETRIST

## 2022-05-11 DIAGNOSIS — Z12.11 COLON CANCER SCREENING: ICD-10-CM

## 2022-05-11 DIAGNOSIS — E11.65 UNCONTROLLED TYPE 2 DIABETES MELLITUS WITH HYPERGLYCEMIA: ICD-10-CM

## 2022-09-09 DIAGNOSIS — E11.65 UNCONTROLLED TYPE 2 DIABETES MELLITUS WITH HYPERGLYCEMIA: ICD-10-CM

## 2022-09-09 RX ORDER — METFORMIN HYDROCHLORIDE 500 MG/1
500 TABLET, EXTENDED RELEASE ORAL 2 TIMES DAILY WITH MEALS
Qty: 60 TABLET | Refills: 0 | Status: SHIPPED | OUTPATIENT
Start: 2022-09-09 | End: 2022-10-19 | Stop reason: SDUPTHER

## 2022-09-09 NOTE — TELEPHONE ENCOUNTER
----- Message from Jerica Beckham sent at 9/9/2022 12:04 PM CDT -----  Type: RX Refill Request    Who Called: self     Have you contacted your pharmacy: no     Refill or New Rx: refill     RX Name and Strength: metFORMIN (GLUCOPHAGE-XR) 500 MG ER 24hr tablet,     Preferred Pharmacy with phone number:   [id number] - [place] [id number] [place] [address] [address]  [facility] [address]  Phone: [phone] Fax: [phone]    Local or Mail Order: local     Would the patient rather a call back or a response via [facility]? Call back     Best Call Back Number: [phone]    Add info: Would like to speak with a nurse regarding this refill.

## 2022-09-09 NOTE — TELEPHONE ENCOUNTER
No new care gaps identified.  [facility] Embedded Care Gaps. Reference number: [id number]. 9/09/2022   12:52:35 PM CDT

## 2022-09-09 NOTE — TELEPHONE ENCOUNTER
Called pt regarding below; no answer; LM to call office; hasn't been seen since 3/2021; has OV scheduled with Halina for end of this month, do you want to send in 30 day supply

## 2022-09-14 DIAGNOSIS — E11.9 TYPE 2 DIABETES MELLITUS WITHOUT COMPLICATION: ICD-10-CM

## 2022-09-22 DIAGNOSIS — E11.9 TYPE 2 DIABETES MELLITUS WITHOUT COMPLICATION: ICD-10-CM

## 2022-09-29 ENCOUNTER — OFFICE VISIT (OUTPATIENT)
Dept: FAMILY MEDICINE | Facility: CLINIC | Age: 62
End: 2022-09-29
Payer: COMMERCIAL

## 2022-09-29 ENCOUNTER — LAB VISIT (OUTPATIENT)
Dept: LAB | Facility: HOSPITAL | Age: 62
End: 2022-09-29
Attending: EMERGENCY MEDICINE
Payer: COMMERCIAL

## 2022-09-29 VITALS
SYSTOLIC BLOOD PRESSURE: 138 MMHG | WEIGHT: 176.81 LBS | HEIGHT: 59 IN | BODY MASS INDEX: 35.64 KG/M2 | RESPIRATION RATE: 16 BRPM | OXYGEN SATURATION: 98 % | TEMPERATURE: 98 F | HEART RATE: 95 BPM | DIASTOLIC BLOOD PRESSURE: 80 MMHG

## 2022-09-29 DIAGNOSIS — Z12.11 COLON CANCER SCREENING: ICD-10-CM

## 2022-09-29 DIAGNOSIS — Z12.31 ENCOUNTER FOR SCREENING MAMMOGRAM FOR MALIGNANT NEOPLASM OF BREAST: ICD-10-CM

## 2022-09-29 DIAGNOSIS — Z23 NEED FOR PNEUMOCOCCAL VACCINE: ICD-10-CM

## 2022-09-29 DIAGNOSIS — E11.65 UNCONTROLLED TYPE 2 DIABETES MELLITUS WITH HYPERGLYCEMIA: ICD-10-CM

## 2022-09-29 DIAGNOSIS — E11.65 UNCONTROLLED TYPE 2 DIABETES MELLITUS WITH HYPERGLYCEMIA: Primary | ICD-10-CM

## 2022-09-29 DIAGNOSIS — E11.65 TYPE 2 DIABETES MELLITUS WITH HYPERGLYCEMIA, WITHOUT LONG-TERM CURRENT USE OF INSULIN: ICD-10-CM

## 2022-09-29 DIAGNOSIS — E78.5 HYPERLIPIDEMIA LDL GOAL <70: ICD-10-CM

## 2022-09-29 DIAGNOSIS — E66.01 SEVERE OBESITY (BMI 35.0-39.9) WITH COMORBIDITY: ICD-10-CM

## 2022-09-29 DIAGNOSIS — Z23 FLU VACCINE NEED: ICD-10-CM

## 2022-09-29 DIAGNOSIS — I10 ESSENTIAL HYPERTENSION: ICD-10-CM

## 2022-09-29 LAB
ALBUMIN SERPL BCP-MCNC: 3.8 G/DL (ref 3.5–5.2)
ALBUMIN/CREAT UR: 5.5 UG/MG (ref 0–30)
ALP SERPL-CCNC: 119 U/L (ref 55–135)
ALT SERPL W/O P-5'-P-CCNC: 15 U/L (ref 10–44)
ANION GAP SERPL CALC-SCNC: 7 MMOL/L (ref 8–16)
AST SERPL-CCNC: 13 U/L (ref 10–40)
BASOPHILS # BLD AUTO: 0.04 K/UL (ref 0–0.2)
BASOPHILS NFR BLD: 0.5 % (ref 0–1.9)
BILIRUB SERPL-MCNC: 0.3 MG/DL (ref 0.1–1)
BUN SERPL-MCNC: 10 MG/DL (ref 8–23)
CALCIUM SERPL-MCNC: 10 MG/DL (ref 8.7–10.5)
CHLORIDE SERPL-SCNC: 102 MMOL/L (ref 95–110)
CHOLEST SERPL-MCNC: 185 MG/DL (ref 120–199)
CHOLEST/HDLC SERPL: 3.4 {RATIO} (ref 2–5)
CO2 SERPL-SCNC: 27 MMOL/L (ref 23–29)
CREAT SERPL-MCNC: 0.8 MG/DL (ref 0.5–1.4)
CREAT UR-MCNC: 109 MG/DL (ref 15–325)
DIFFERENTIAL METHOD: ABNORMAL
EOSINOPHIL # BLD AUTO: 0.1 K/UL (ref 0–0.5)
EOSINOPHIL NFR BLD: 0.7 % (ref 0–8)
ERYTHROCYTE [DISTWIDTH] IN BLOOD BY AUTOMATED COUNT: 12.6 % (ref 11.5–14.5)
EST. GFR  (NO RACE VARIABLE): >60 ML/MIN/1.73 M^2
ESTIMATED AVG GLUCOSE: 303 MG/DL (ref 68–131)
GLUCOSE SERPL-MCNC: 206 MG/DL (ref 70–110)
HBA1C MFR BLD: 12.2 % (ref 4–5.6)
HCT VFR BLD AUTO: 45.9 % (ref 37–48.5)
HDLC SERPL-MCNC: 55 MG/DL (ref 40–75)
HDLC SERPL: 29.7 % (ref 20–50)
HGB BLD-MCNC: 14.2 G/DL (ref 12–16)
IMM GRANULOCYTES # BLD AUTO: 0.02 K/UL (ref 0–0.04)
IMM GRANULOCYTES NFR BLD AUTO: 0.2 % (ref 0–0.5)
LDLC SERPL CALC-MCNC: 110.6 MG/DL (ref 63–159)
LYMPHOCYTES # BLD AUTO: 2.3 K/UL (ref 1–4.8)
LYMPHOCYTES NFR BLD: 28 % (ref 18–48)
MCH RBC QN AUTO: 28 PG (ref 27–31)
MCHC RBC AUTO-ENTMCNC: 30.9 G/DL (ref 32–36)
MCV RBC AUTO: 90 FL (ref 82–98)
MICROALBUMIN UR DL<=1MG/L-MCNC: 6 UG/ML
MONOCYTES # BLD AUTO: 0.6 K/UL (ref 0.3–1)
MONOCYTES NFR BLD: 7.4 % (ref 4–15)
NEUTROPHILS # BLD AUTO: 5.3 K/UL (ref 1.8–7.7)
NEUTROPHILS NFR BLD: 63.2 % (ref 38–73)
NONHDLC SERPL-MCNC: 130 MG/DL
NRBC BLD-RTO: 0 /100 WBC
PLATELET # BLD AUTO: 272 K/UL (ref 150–450)
PMV BLD AUTO: 12 FL (ref 9.2–12.9)
POTASSIUM SERPL-SCNC: 3.9 MMOL/L (ref 3.5–5.1)
PROT SERPL-MCNC: 7.6 G/DL (ref 6–8.4)
RBC # BLD AUTO: 5.08 M/UL (ref 4–5.4)
SODIUM SERPL-SCNC: 136 MMOL/L (ref 136–145)
TRIGL SERPL-MCNC: 97 MG/DL (ref 30–150)
WBC # BLD AUTO: 8.33 K/UL (ref 3.9–12.7)

## 2022-09-29 PROCEDURE — 90472 PNEUMOCOCCAL CONJUGATE VACCINE 20-VALENT: ICD-10-PCS | Mod: S$GLB,,, | Performed by: PHYSICIAN ASSISTANT

## 2022-09-29 PROCEDURE — 4010F PR ACE/ARB THEARPY RXD/TAKEN: ICD-10-PCS | Mod: CPTII,S$GLB,, | Performed by: PHYSICIAN ASSISTANT

## 2022-09-29 PROCEDURE — 99214 OFFICE O/P EST MOD 30 MIN: CPT | Mod: 25,S$GLB,, | Performed by: PHYSICIAN ASSISTANT

## 2022-09-29 PROCEDURE — 1160F PR REVIEW ALL MEDS BY PRESCRIBER/CLIN PHARMACIST DOCUMENTED: ICD-10-PCS | Mod: CPTII,S$GLB,, | Performed by: PHYSICIAN ASSISTANT

## 2022-09-29 PROCEDURE — 83036 HEMOGLOBIN GLYCOSYLATED A1C: CPT | Performed by: PHYSICIAN ASSISTANT

## 2022-09-29 PROCEDURE — 99999 PR PBB SHADOW E&M-EST. PATIENT-LVL V: CPT | Mod: PBBFAC,,, | Performed by: PHYSICIAN ASSISTANT

## 2022-09-29 PROCEDURE — 82570 ASSAY OF URINE CREATININE: CPT | Performed by: PHYSICIAN ASSISTANT

## 2022-09-29 PROCEDURE — 90472 IMMUNIZATION ADMIN EACH ADD: CPT | Mod: S$GLB,,, | Performed by: PHYSICIAN ASSISTANT

## 2022-09-29 PROCEDURE — 3075F SYST BP GE 130 - 139MM HG: CPT | Mod: CPTII,S$GLB,, | Performed by: PHYSICIAN ASSISTANT

## 2022-09-29 PROCEDURE — 90677 PNEUMOCOCCAL CONJUGATE VACCINE 20-VALENT: ICD-10-PCS | Mod: S$GLB,,, | Performed by: PHYSICIAN ASSISTANT

## 2022-09-29 PROCEDURE — 3008F BODY MASS INDEX DOCD: CPT | Mod: CPTII,S$GLB,, | Performed by: PHYSICIAN ASSISTANT

## 2022-09-29 PROCEDURE — 90677 PCV20 VACCINE IM: CPT | Mod: S$GLB,,, | Performed by: PHYSICIAN ASSISTANT

## 2022-09-29 PROCEDURE — 99214 PR OFFICE/OUTPT VISIT, EST, LEVL IV, 30-39 MIN: ICD-10-PCS | Mod: 25,S$GLB,, | Performed by: PHYSICIAN ASSISTANT

## 2022-09-29 PROCEDURE — 36415 COLL VENOUS BLD VENIPUNCTURE: CPT | Mod: PO | Performed by: PHYSICIAN ASSISTANT

## 2022-09-29 PROCEDURE — 90471 FLU VACCINE (QUAD) GREATER THAN OR EQUAL TO 3YO PRESERVATIVE FREE IM: ICD-10-PCS | Mod: S$GLB,,, | Performed by: PHYSICIAN ASSISTANT

## 2022-09-29 PROCEDURE — 3079F DIAST BP 80-89 MM HG: CPT | Mod: CPTII,S$GLB,, | Performed by: PHYSICIAN ASSISTANT

## 2022-09-29 PROCEDURE — 90686 FLU VACCINE (QUAD) GREATER THAN OR EQUAL TO 3YO PRESERVATIVE FREE IM: ICD-10-PCS | Mod: S$GLB,,, | Performed by: PHYSICIAN ASSISTANT

## 2022-09-29 PROCEDURE — 3075F PR MOST RECENT SYSTOLIC BLOOD PRESS GE 130-139MM HG: ICD-10-PCS | Mod: CPTII,S$GLB,, | Performed by: PHYSICIAN ASSISTANT

## 2022-09-29 PROCEDURE — 1159F PR MEDICATION LIST DOCUMENTED IN MEDICAL RECORD: ICD-10-PCS | Mod: CPTII,S$GLB,, | Performed by: PHYSICIAN ASSISTANT

## 2022-09-29 PROCEDURE — 85025 COMPLETE CBC W/AUTO DIFF WBC: CPT | Performed by: PHYSICIAN ASSISTANT

## 2022-09-29 PROCEDURE — 3072F PR LOW RISK FOR RETINOPATHY: ICD-10-PCS | Mod: CPTII,S$GLB,, | Performed by: PHYSICIAN ASSISTANT

## 2022-09-29 PROCEDURE — 80053 COMPREHEN METABOLIC PANEL: CPT | Performed by: PHYSICIAN ASSISTANT

## 2022-09-29 PROCEDURE — 90471 IMMUNIZATION ADMIN: CPT | Mod: S$GLB,,, | Performed by: PHYSICIAN ASSISTANT

## 2022-09-29 PROCEDURE — 90686 IIV4 VACC NO PRSV 0.5 ML IM: CPT | Mod: S$GLB,,, | Performed by: PHYSICIAN ASSISTANT

## 2022-09-29 PROCEDURE — 4010F ACE/ARB THERAPY RXD/TAKEN: CPT | Mod: CPTII,S$GLB,, | Performed by: PHYSICIAN ASSISTANT

## 2022-09-29 PROCEDURE — 3079F PR MOST RECENT DIASTOLIC BLOOD PRESSURE 80-89 MM HG: ICD-10-PCS | Mod: CPTII,S$GLB,, | Performed by: PHYSICIAN ASSISTANT

## 2022-09-29 PROCEDURE — 1159F MED LIST DOCD IN RCRD: CPT | Mod: CPTII,S$GLB,, | Performed by: PHYSICIAN ASSISTANT

## 2022-09-29 PROCEDURE — 3008F PR BODY MASS INDEX (BMI) DOCUMENTED: ICD-10-PCS | Mod: CPTII,S$GLB,, | Performed by: PHYSICIAN ASSISTANT

## 2022-09-29 PROCEDURE — 3072F LOW RISK FOR RETINOPATHY: CPT | Mod: CPTII,S$GLB,, | Performed by: PHYSICIAN ASSISTANT

## 2022-09-29 PROCEDURE — 1160F RVW MEDS BY RX/DR IN RCRD: CPT | Mod: CPTII,S$GLB,, | Performed by: PHYSICIAN ASSISTANT

## 2022-09-29 PROCEDURE — 80061 LIPID PANEL: CPT | Performed by: PHYSICIAN ASSISTANT

## 2022-09-29 PROCEDURE — 99999 PR PBB SHADOW E&M-EST. PATIENT-LVL V: ICD-10-PCS | Mod: PBBFAC,,, | Performed by: PHYSICIAN ASSISTANT

## 2022-09-29 RX ORDER — ATORVASTATIN CALCIUM 40 MG/1
40 TABLET, FILM COATED ORAL NIGHTLY
Qty: 90 TABLET | Refills: 3 | Status: SHIPPED | OUTPATIENT
Start: 2022-09-29 | End: 2022-12-07 | Stop reason: ALTCHOICE

## 2022-09-29 RX ORDER — VALSARTAN 80 MG/1
80 TABLET ORAL DAILY
Qty: 90 TABLET | Refills: 2 | Status: SHIPPED | OUTPATIENT
Start: 2022-09-29 | End: 2023-10-23 | Stop reason: SDUPTHER

## 2022-09-29 RX ORDER — CHLORHEXIDINE GLUCONATE ORAL RINSE 1.2 MG/ML
15 SOLUTION DENTAL 2 TIMES DAILY
COMMUNITY
Start: 2022-06-18

## 2022-09-29 RX ORDER — CETIRIZINE HYDROCHLORIDE 10 MG/1
10 TABLET ORAL DAILY
COMMUNITY
Start: 2022-05-29 | End: 2023-10-20 | Stop reason: SDUPTHER

## 2022-09-29 RX ORDER — OLOPATADINE HYDROCHLORIDE 665 UG/1
2 SPRAY NASAL 2 TIMES DAILY
COMMUNITY
Start: 2022-05-29 | End: 2023-10-20 | Stop reason: SDUPTHER

## 2022-09-29 RX ORDER — IBUPROFEN 600 MG/1
600 TABLET ORAL EVERY 6 HOURS PRN
COMMUNITY
Start: 2022-06-18 | End: 2023-04-11 | Stop reason: SDUPTHER

## 2022-09-29 NOTE — PROGRESS NOTES
Subjective:       Patient ID: Joanie Chandler is a 61 y.o. female.    Chief Complaint: Follow-up    A 60 y/o female present for HTN and Diabetes follow-up.  Expresses she wants to get her justino back on track with medication compliance. Patient states she needs medication refills for Atorvastatin and Valsartan. Eye exam was in 2021. Refuses consult for Podiatry, states feet are good. Blood glucose: states she needs strips for monitor. Misplaced her Blood Pressure machine and cuff at home. Diet consist of fruits and vegetables, still on fried food but reduced them to eat more baked fish and chicken breast and denies adding salt to food. 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 Walks 4-5 blocks to catch the bus everyday. Has been enduring more stress since her sister was diagnosed with stage 4 Breast cancer and now taking care of her while working.   Request to schedule her mammogram next week. Denies numbness and tingling, visual changes, headaches, nausea, vomiting, leg edema and dizziness.   Review of Systems   Constitutional:  Negative for chills and fever.   HENT:  Negative for nosebleeds, rhinorrhea and sore throat.    Eyes:  Negative for visual disturbance.   Respiratory:  Negative for cough, shortness of breath and wheezing.    Cardiovascular:  Negative for chest pain and leg swelling.   Gastrointestinal:  Negative for abdominal pain, constipation, diarrhea, nausea and vomiting.   Genitourinary:  Negative for difficulty urinating.   Musculoskeletal:  Negative for back pain and neck pain.   Integumentary:  Negative for rash.   Neurological:  Negative for syncope, weakness, light-headedness, numbness and headaches.       Objective:      Physical Exam  Constitutional:       Appearance: Normal appearance. She is obese.   Cardiovascular:      Rate and Rhythm: Normal rate and regular rhythm.      Heart sounds: Normal heart sounds.   Pulmonary:      Effort: Pulmonary effort is normal.      Breath  sounds: Normal breath sounds.   Abdominal:      Palpations: Abdomen is soft.   Musculoskeletal:         General: No swelling or tenderness.      Right lower leg: No swelling. No edema.      Left lower leg: No swelling. No edema.      Right ankle: No swelling or deformity. Normal pulse.      Left ankle: No swelling or deformity. Normal pulse.        Legs:    Neurological:      General: No focal deficit present.      Mental Status: She is alert and oriented to person, place, and time.   Psychiatric:         Mood and Affect: Mood normal.         Behavior: Behavior normal.       Assessment:       Problem List Items Addressed This Visit       Uncontrolled type 2 diabetes mellitus with hyperglycemia - Primary    Relevant Medications    blood sugar diagnostic Strp    Other Relevant Orders    Microalbumin/Creatinine Ratio, Urine    Hemoglobin A1C    Essential hypertension    Relevant Medications    valsartan (DIOVAN) 80 MG tablet    Other Relevant Orders    CBC Auto Differential    Lipid Panel    Comprehensive Metabolic Panel    BMI 40.0-44.9, adult    Hyperlipidemia LDL goal <70    Relevant Medications    atorvastatin (LIPITOR) 40 MG tablet     Other Visit Diagnoses       Encounter for screening mammogram for malignant neoplasm of breast        Relevant Orders    Mammo Digital Screening Bilat w/ Jm    Type 2 diabetes mellitus with hyperglycemia, without long-term current use of insulin        Relevant Medications    blood sugar diagnostic Strp    Flu vaccine need        Relevant Orders    Influenza - Quadrivalent *Preferred* (6 months+) (PF) (Completed)    Need for pneumococcal vaccine        Relevant Orders    (In Office Administered) Pneumococcal Conjugate Vaccine (20 Valent) (IM) (Completed)    Colon cancer screening        Relevant Orders    Ambulatory referral/consult to Endo Procedure               Plan:         Joanie was seen today for follow-up.    Diagnoses and all orders for this visit:    Uncontrolled  type 2 diabetes mellitus with hyperglycemia  -     Microalbumin/Creatinine Ratio, Urine  -     Hemoglobin A1C; Future  -     blood sugar diagnostic Strp; One test strip use 2 times a day to check blood glucose,  ICD-10: E11.9, compatible with insurance/glucometer  -     Instructed patient to take medications and directed; follow a diabetic diet; use glucometer 3x daily to check blood glucose; will discuss f/u once labs are reviewed    Essential hypertension  -     valsartan (DIOVAN) 80 MG tablet; Take 1 tablet (80 mg total) by mouth once daily.  -     CBC Auto Differential; Future  -     Lipid Panel; Future  -     Comprehensive Metabolic Panel; Future  -     controlled, Instructed patient to take medications and directed; follow a cardiac/low sodium diet; become more physically active    Severe obesity (BMI 35.0-39.9) with comorbidity        -     Follow a diets above and become more physically active    Encounter for screening mammogram for malignant neoplasm of breast  -     Mammo Digital Screening Bilat w/ Jm; Future  -     Scheduled for 10/7/2022 at 9 am    Hyperlipidemia LDL goal <70  -     atorvastatin (LIPITOR) 40 MG tablet; Take 1 tablet (40 mg total) by mouth every evening.  -     Follow a cardiac/low sodium diet; become more physically active    Type 2 diabetes mellitus with hyperglycemia, without long-term current use of insulin  -     blood sugar diagnostic Strp; One test strip use 2 times a day to check blood glucose,  ICD-10: E11.9, compatible with insurance/glucometer    Flu vaccine need  -     Influenza - Quadrivalent *Preferred* (6 months+) (PF), Today    Need for pneumococcal vaccine  -     (In Office Administered) Pneumococcal Conjugate Vaccine (20 Valent) (IM), Today    Colon cancer screening  -     Ambulatory referral/consult to Endo Procedure ; Future  -      will call patient to make appointment       I hereby acknowledge that I am relying upon documentation authored by a  PA student working under my supervision and further I hereby attest that I have verified the student documentation or findings by personally re-performing the physical exam and medical decision making activities of the Evaluation and Management service to be billed.  Halina Johnson PAC

## 2022-09-29 NOTE — PROGRESS NOTES
Pt tolerated flu and pneumonia vaccine to left deltoid without difficulty; no adverse reaction noted; VIS given

## 2022-09-29 NOTE — PROGRESS NOTES
Health Maintenance Due   Topic     Foot Exam      TETANUS VACCINE      Low Dose Statin      Colorectal Cancer Screening      Shingles Vaccine (1 of 2)     Pneumococcal Vaccines (Age 0-64) (2 - PCV)     Hemoglobin A1c      COVID-19 Vaccine (4 - Booster for Pfizer series)     Diabetes Urine Screening      Lipid Panel      Mammogram      Influenza Vaccine (1)

## 2022-09-30 ENCOUNTER — TELEPHONE (OUTPATIENT)
Dept: FAMILY MEDICINE | Facility: CLINIC | Age: 62
End: 2022-09-30
Payer: COMMERCIAL

## 2022-09-30 NOTE — TELEPHONE ENCOUNTER
----- Message from Judy Hernandez sent at 9/30/2022  2:44 PM CDT -----  Regarding: question about appt  Type: Patient Call Back    Who called: Self    What is the request in detail: Pt was just on the line but called back with another question about her test results and/or appt. Please call    Can the clinic reply by [facility]? no    Would the patient rather a call back or a response via [facility]? Call    Best call back number: [phone]

## 2022-09-30 NOTE — TELEPHONE ENCOUNTER
----- Message from Halina Johnson PA-C sent at 9/30/2022 12:39 PM CDT -----  Diabetes is worsening. I want to her to call dr tse to follow up can call [phone] to schedule with him.   Other labs are stable

## 2022-10-03 ENCOUNTER — TELEPHONE (OUTPATIENT)
Dept: FAMILY MEDICINE | Facility: CLINIC | Age: 62
End: 2022-10-03
Payer: COMMERCIAL

## 2022-10-03 DIAGNOSIS — E11.65 TYPE 2 DIABETES MELLITUS WITH HYPERGLYCEMIA, WITHOUT LONG-TERM CURRENT USE OF INSULIN: ICD-10-CM

## 2022-10-03 RX ORDER — GLIPIZIDE 10 MG/1
TABLET ORAL
Qty: 60 TABLET | Refills: 2 | Status: SHIPPED | OUTPATIENT
Start: 2022-10-03 | End: 2022-11-02

## 2022-10-03 NOTE — TELEPHONE ENCOUNTER
Metformin and glipzide sent. I sent a message stating she needs to f/u with Dr. Barbosa in Endo.   Halina

## 2022-10-03 NOTE — TELEPHONE ENCOUNTER
----- Message from Salma Bobo sent at 10/3/2022 10:53 AM CDT -----  Regarding: patient call back/ questions about a medication from her last week 09/29  Type: Patient Call Back    Who called:Joanie     What is the request in detail: the patient called to check the status of a medication. She stated that she was seen by NP Alex on 9/29 and was told that medication was going to be ordered for her diabetes. Patient has reached out to her pharmacy but was told that nothing was available. Please return call at earliest convenience.    Can the clinic reply by [facility]?no    Would the patient rather a call back or a response via [facility]? Call back     Best call back number:[phone]

## 2022-10-06 ENCOUNTER — OFFICE VISIT (OUTPATIENT)
Dept: ENDOCRINOLOGY | Facility: CLINIC | Age: 62
End: 2022-10-06
Payer: COMMERCIAL

## 2022-10-06 VITALS
WEIGHT: 178 LBS | BODY MASS INDEX: 35.95 KG/M2 | SYSTOLIC BLOOD PRESSURE: 156 MMHG | DIASTOLIC BLOOD PRESSURE: 92 MMHG | TEMPERATURE: 98 F | HEART RATE: 103 BPM

## 2022-10-06 DIAGNOSIS — E66.01 SEVERE OBESITY (BMI 35.0-39.9) WITH COMORBIDITY: ICD-10-CM

## 2022-10-06 DIAGNOSIS — E11.65 UNCONTROLLED TYPE 2 DIABETES MELLITUS WITH HYPERGLYCEMIA: Primary | ICD-10-CM

## 2022-10-06 DIAGNOSIS — I10 ESSENTIAL HYPERTENSION: ICD-10-CM

## 2022-10-06 PROCEDURE — 3080F DIAST BP >= 90 MM HG: CPT | Mod: CPTII,S$GLB,, | Performed by: NURSE PRACTITIONER

## 2022-10-06 PROCEDURE — 1160F RVW MEDS BY RX/DR IN RCRD: CPT | Mod: CPTII,S$GLB,, | Performed by: NURSE PRACTITIONER

## 2022-10-06 PROCEDURE — 3066F PR DOCUMENTATION OF TREATMENT FOR NEPHROPATHY: ICD-10-PCS | Mod: CPTII,S$GLB,, | Performed by: NURSE PRACTITIONER

## 2022-10-06 PROCEDURE — 1159F PR MEDICATION LIST DOCUMENTED IN MEDICAL RECORD: ICD-10-PCS | Mod: CPTII,S$GLB,, | Performed by: NURSE PRACTITIONER

## 2022-10-06 PROCEDURE — 3077F PR MOST RECENT SYSTOLIC BLOOD PRESSURE >= 140 MM HG: ICD-10-PCS | Mod: CPTII,S$GLB,, | Performed by: NURSE PRACTITIONER

## 2022-10-06 PROCEDURE — 3061F NEG MICROALBUMINURIA REV: CPT | Mod: CPTII,S$GLB,, | Performed by: NURSE PRACTITIONER

## 2022-10-06 PROCEDURE — 99214 OFFICE O/P EST MOD 30 MIN: CPT | Mod: S$GLB,,, | Performed by: NURSE PRACTITIONER

## 2022-10-06 PROCEDURE — 3077F SYST BP >= 140 MM HG: CPT | Mod: CPTII,S$GLB,, | Performed by: NURSE PRACTITIONER

## 2022-10-06 PROCEDURE — 99999 PR PBB SHADOW E&M-EST. PATIENT-LVL V: ICD-10-PCS | Mod: PBBFAC,,, | Performed by: NURSE PRACTITIONER

## 2022-10-06 PROCEDURE — 1160F PR REVIEW ALL MEDS BY PRESCRIBER/CLIN PHARMACIST DOCUMENTED: ICD-10-PCS | Mod: CPTII,S$GLB,, | Performed by: NURSE PRACTITIONER

## 2022-10-06 PROCEDURE — 3066F NEPHROPATHY DOC TX: CPT | Mod: CPTII,S$GLB,, | Performed by: NURSE PRACTITIONER

## 2022-10-06 PROCEDURE — 3046F HEMOGLOBIN A1C LEVEL >9.0%: CPT | Mod: CPTII,S$GLB,, | Performed by: NURSE PRACTITIONER

## 2022-10-06 PROCEDURE — 99999 PR PBB SHADOW E&M-EST. PATIENT-LVL V: CPT | Mod: PBBFAC,,, | Performed by: NURSE PRACTITIONER

## 2022-10-06 PROCEDURE — 3008F BODY MASS INDEX DOCD: CPT | Mod: CPTII,S$GLB,, | Performed by: NURSE PRACTITIONER

## 2022-10-06 PROCEDURE — 3080F PR MOST RECENT DIASTOLIC BLOOD PRESSURE >= 90 MM HG: ICD-10-PCS | Mod: CPTII,S$GLB,, | Performed by: NURSE PRACTITIONER

## 2022-10-06 PROCEDURE — 3046F PR MOST RECENT HEMOGLOBIN A1C LEVEL > 9.0%: ICD-10-PCS | Mod: CPTII,S$GLB,, | Performed by: NURSE PRACTITIONER

## 2022-10-06 PROCEDURE — 3008F PR BODY MASS INDEX (BMI) DOCUMENTED: ICD-10-PCS | Mod: CPTII,S$GLB,, | Performed by: NURSE PRACTITIONER

## 2022-10-06 PROCEDURE — 1159F MED LIST DOCD IN RCRD: CPT | Mod: CPTII,S$GLB,, | Performed by: NURSE PRACTITIONER

## 2022-10-06 PROCEDURE — 4010F ACE/ARB THERAPY RXD/TAKEN: CPT | Mod: CPTII,S$GLB,, | Performed by: NURSE PRACTITIONER

## 2022-10-06 PROCEDURE — 3072F LOW RISK FOR RETINOPATHY: CPT | Mod: CPTII,S$GLB,, | Performed by: NURSE PRACTITIONER

## 2022-10-06 PROCEDURE — 4010F PR ACE/ARB THEARPY RXD/TAKEN: ICD-10-PCS | Mod: CPTII,S$GLB,, | Performed by: NURSE PRACTITIONER

## 2022-10-06 PROCEDURE — 99214 PR OFFICE/OUTPT VISIT, EST, LEVL IV, 30-39 MIN: ICD-10-PCS | Mod: S$GLB,,, | Performed by: NURSE PRACTITIONER

## 2022-10-06 PROCEDURE — 3072F PR LOW RISK FOR RETINOPATHY: ICD-10-PCS | Mod: CPTII,S$GLB,, | Performed by: NURSE PRACTITIONER

## 2022-10-06 PROCEDURE — 3061F PR NEG MICROALBUMINURIA RESULT DOCUMENTED/REVIEW: ICD-10-PCS | Mod: CPTII,S$GLB,, | Performed by: NURSE PRACTITIONER

## 2022-10-06 RX ORDER — DULAGLUTIDE 1.5 MG/.5ML
1.5 INJECTION, SOLUTION SUBCUTANEOUS
Qty: 4 PEN | Refills: 1 | Status: SHIPPED | OUTPATIENT
Start: 2022-10-06 | End: 2022-12-07 | Stop reason: SDUPTHER

## 2022-10-06 RX ORDER — DULAGLUTIDE 0.75 MG/.5ML
0.75 INJECTION, SOLUTION SUBCUTANEOUS
Qty: 4 PEN | Refills: 0 | Status: SHIPPED | OUTPATIENT
Start: 2022-10-06 | End: 2022-12-07 | Stop reason: ALTCHOICE

## 2022-10-06 NOTE — PROGRESS NOTES
CC: This 61 y.o. Black or  female  is here for evaluation of  T2DM along with comorbidities indicated in the Visit Diagnosis section of this encounter.    HPI: Joanie Chandler was diagnosed with T2DM in ~ 2010.     DM COMPLICATIONS: none      Last seen by Dr. Barbosa in 4/2021. New to me.   A1c remains high, from 12% at lov to now 12.2%.     She has not been taking care of herself, focusing more on her sister with advanced cancer.  She was missing a lot of med doses. She is now trying to incorporate more vegetables and has improved med adherence.   Tries to eat small portions.     No longer having diarrhea now that she is taking metformin  mg bid     LAST DIABETES EDUCATION: never     HOSPITALIZED FOR DIABETES -  No.    SIGNIFICANT DIABETES MED HISTORY:  Metformin instant release - jose    PRESCRIBED DIABETES MEDICATIONS:   Metformin  mg bid   Glipizide 10 mg bid       Misses medication doses - No missed doses for the last week.       SELF MONITORING BLOOD GLUCOSE: Checks blood glucose at home - none. But will start now that she has new glucometer supplies.     HYPOGLYCEMIC EPISODES: none      CURRENT DIET: no sodas. Drinks water. Eats 3 meals/day.   Has tried to lower carb intake.   Occasional snacks - peanut butter crackers, yogurt.     Dinner - baked fish, salad, crabmeat. Lunch was chicken baked, salad. Breakfast - cheerios with milk and apple.       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 No acute distress.   PSYCH: AAOx3, appropriate mood and affect, conversant, well-groomed. Judgement and insight good.   NEURO: Cranial nerves grossly  intact. Speech clear, no tremor.   CHEST: Respirations even and unlabored.   SKIN: Normal skin turgor. Skin warm and dry. No areas of breakdown. No acanthosis nigricans.  FEET:     Protective Sensation (w/ 10 gram monofilament):  Right: Intact  Left: Intact    Visual Inspection:  Skin Breakdown -  Neither    Pedal Pulses:   Right: Present  Left: Present    Posterior tibialis:   Right:Present  Left: Present      Hemoglobin A1C   Date Value Ref Range Status   09/29/2022 12.2 (H) 4.0 - 5.6 % Final     Comment:     ADA Screening Guidelines:  5.7-6.4%  Consistent with prediabetes  >or=6.5%  Consistent with diabetes    High levels of fetal hemoglobin interfere with the HbA1C  assay. Heterozygous hemoglobin variants (HbS, HgC, etc)do  not significantly interfere with this assay.   However, presence of multiple variants may affect accuracy.     03/11/2021 12.0 (H) 4.0 - 5.6 % Final     Comment:     ADA Screening Guidelines:  5.7-6.4%  Consistent with prediabetes  >or=6.5%  Consistent with diabetes    High levels of fetal hemoglobin interfere with the HbA1C  assay. Heterozygous hemoglobin variants (HbS, HgC, etc)do  not significantly interfere with this assay.   However, presence of multiple variants may affect accuracy.     02/14/2020 10.4 (H) 4.0 - 5.6 % Final     Comment:     ADA Screening Guidelines:  5.7-6.4%  Consistent with prediabetes  >or=6.5%  Consistent with diabetes  High levels of fetal hemoglobin interfere with the HbA1C  assay. Heterozygous hemoglobin variants (HbS, HgC, etc)do  not significantly interfere with this assay.   However, presence of multiple variants may affect accuracy.         No results found for: CPEPTIDE, GLUTAMICACID, ISLETCELLANT, FRUCTOSAMINE     Lab Results   Component Value Date    CHOL 185 09/29/2022    CHOL 221 (H) 03/11/2021    CHOL 232 (H) 02/14/2020     Lab Results   Component Value Date    HDL 55 09/29/2022    HDL 56 03/11/2021    HDL 57 02/14/2020     Lab Results   Component Value  Date    LDLCALC 110.6 09/29/2022    LDLCALC 143.2 03/11/2021    LDLCALC 152.4 02/14/2020     Lab Results   Component Value Date    TRIG 97 09/29/2022    TRIG 109 03/11/2021    TRIG 113 02/14/2020     Lab Results   Component Value Date    CHOLHDL 29.7 09/29/2022    CHOLHDL 25.3 03/11/2021    CHOLHDL 24.6 02/14/2020         Chemistry        Component Value Date/Time     09/29/2022 1425    K 3.9 09/29/2022 1425     09/29/2022 1425    CO2 27 09/29/2022 1425    BUN 10 09/29/2022 1425    CREATININE 0.8 09/29/2022 1425     (H) 09/29/2022 1425        Component Value Date/Time    CALCIUM 10.0 09/29/2022 1425    ALKPHOS 119 09/29/2022 1425    AST 13 09/29/2022 1425    ALT 15 09/29/2022 1425    BILITOT 0.3 09/29/2022 1425    ESTGFRAFRICA >60 03/11/2021 1108    EGFRNONAA >60 03/11/2021 1108              Lab Results   Component Value Date    LABMICR 6.0 09/29/2022    CREATRANDUR 109.0 09/29/2022    MICALBCREAT 5.5 09/29/2022             ASSESSMENT and PLAN:    A1C GOAL: < 7 %     1. Uncontrolled type 2 diabetes mellitus with hyperglycemia  Discussed progression of DM disease, long term complications, and tx options. Reviewed A1c and BG goals.       See Diabetes Education. Call Scheduling department for Diabetes Education at [phone].  - pt will schedule herself.   Continue metformin and glipizide.   Start Trulicity 0.75 mg once weekly for 4 weeks. Then increase to Trulicity 1.5 mg once weekly. Reviewed MOA, side effects (nausea, diarrhea, constipation), and administration.     Test blood sugar 2x/day - fasting and again 2 hours after a meal. Keep a blood sugar log.     Return to clinic in 6 weeks.     Ambulatory referral/consult to Diabetes Education    dulaglutide (TRULICITY) 0.75 mg/0.5 mL pen injector    dulaglutide (TRULICITY) 1.5 mg/0.5 mL pen injector      2. Essential hypertension  Pt has not taken her meds yet today.       3. Severe obesity (BMI 35.0-39.9) with comorbidity  Increases insulin  resistance.   May lose weight with Trulicity           Orders Placed This Encounter   Procedures    Ambulatory referral/consult to Diabetes Education     Standing Status:   Future     Standing Expiration Date:   10/6/2023     Referral Priority:   Routine     Referral Type:   Consultation     Referral Reason:   Specialty Services Required     Requested Specialty:   Endocrinology     Number of Visits Requested:   1     Expiration Date:   10/6/2023        Follow up in about 6 weeks (around 11/17/2022).

## 2022-10-06 NOTE — PATIENT INSTRUCTIONS
A1C goal: <7%  Fasting/premeal blood glucose goal:   2 hour post-meal blood glucose goal: less than 180      See Diabetes Education. Call Scheduling department for Diabetes Education at [phone].   Continue metformin and glipizide.   Start Trulicity 0.75 mg once weekly for 4 weeks. Then increase to Trulicity 1.5 mg once weekly. Reviewed MOA, side effects (nausea, diarrhea, constipation), and administration.     Test blood sugar 2x/day - fasting and again 2 hours after a meal. Keep a blood sugar log.     Return to clinic in 6 weeks.

## 2022-10-07 ENCOUNTER — HOSPITAL ENCOUNTER (OUTPATIENT)
Dept: RADIOLOGY | Facility: HOSPITAL | Age: 62
Discharge: HOME | End: 2022-10-07
Attending: PHYSICIAN ASSISTANT
Payer: COMMERCIAL

## 2022-10-07 DIAGNOSIS — Z12.31 ENCOUNTER FOR SCREENING MAMMOGRAM FOR MALIGNANT NEOPLASM OF BREAST: ICD-10-CM

## 2022-10-07 PROCEDURE — 77063 BREAST TOMOSYNTHESIS BI: CPT | Mod: TC

## 2022-10-07 PROCEDURE — 77067 SCR MAMMO BI INCL CAD: CPT | Mod: TC

## 2022-10-07 PROCEDURE — 77067 MAMMO DIGITAL SCREENING BILAT WITH TOMO: ICD-10-PCS | Mod: 26,,, | Performed by: RADIOLOGY

## 2022-10-07 PROCEDURE — 77063 MAMMO DIGITAL SCREENING BILAT WITH TOMO: ICD-10-PCS | Mod: 26,,, | Performed by: RADIOLOGY

## 2022-10-07 PROCEDURE — 77063 BREAST TOMOSYNTHESIS BI: CPT | Mod: 26,,, | Performed by: RADIOLOGY

## 2022-10-07 PROCEDURE — 77067 SCR MAMMO BI INCL CAD: CPT | Mod: 26,,, | Performed by: RADIOLOGY

## 2022-10-11 ENCOUNTER — PATIENT OUTREACH (OUTPATIENT)
Dept: ADMINISTRATIVE | Facility: HOSPITAL | Age: 62
End: 2022-10-11
Payer: COMMERCIAL

## 2022-10-19 DIAGNOSIS — E11.65 UNCONTROLLED TYPE 2 DIABETES MELLITUS WITH HYPERGLYCEMIA: ICD-10-CM

## 2022-10-19 DIAGNOSIS — E11.65 TYPE 2 DIABETES MELLITUS WITH HYPERGLYCEMIA, WITHOUT LONG-TERM CURRENT USE OF INSULIN: ICD-10-CM

## 2022-10-19 RX ORDER — INSULIN PUMP SYRINGE, 3 ML
EACH MISCELLANEOUS
Qty: 1 EACH | Refills: 0 | Status: SHIPPED | OUTPATIENT
Start: 2022-10-19 | End: 2022-12-07 | Stop reason: SDUPTHER

## 2022-10-19 RX ORDER — METFORMIN HYDROCHLORIDE 500 MG/1
500 TABLET, EXTENDED RELEASE ORAL 2 TIMES DAILY WITH MEALS
Qty: 60 TABLET | Refills: 0 | Status: SHIPPED | OUTPATIENT
Start: 2022-10-19 | End: 2022-11-02 | Stop reason: SDUPTHER

## 2022-10-19 NOTE — TELEPHONE ENCOUNTER
Last Office Visit Info:   The patient's last visit with Leni Castanon MD was on 9/29/2022.    The patient's last visit in current department was on 9/29/2022.        Last CBC Results:   Lab Results   Component Value Date    WBC 8.33 09/29/2022    HGB 14.2 09/29/2022    HCT 45.9 09/29/2022     09/29/2022       Last CMP Results  Lab Results   Component Value Date     09/29/2022    K 3.9 09/29/2022     09/29/2022    CO2 27 09/29/2022    BUN 10 09/29/2022    CREATININE 0.8 09/29/2022    CALCIUM 10.0 09/29/2022    ALBUMIN 3.8 09/29/2022    AST 13 09/29/2022    ALT 15 09/29/2022       Last Lipids  Lab Results   Component Value Date    CHOL 185 09/29/2022    TRIG 97 09/29/2022    HDL 55 09/29/2022    LDLCALC 110.6 09/29/2022       Last A1C  Lab Results   Component Value Date    HGBA1C 12.2 (H) 09/29/2022       Last TSH  Lab Results   Component Value Date    TSH 0.741 03/11/2021             Current Med Refills  Medication List with Changes/Refills   Current Medications    ATORVASTATIN (LIPITOR) 40 MG TABLET    Take 1 tablet (40 mg total) by mouth every evening.       Start Date: 9/29/2022 End Date: 9/29/2023    BLOOD SUGAR DIAGNOSTIC STRP    One test strip use 2 times a day to check blood glucose,  ICD-10: E11.9, compatible with insurance/glucometer       Start Date: 9/29/2022 End Date: --    BLOOD-GLUCOSE METER KIT    One glucometer, use to check blood glucose.   ICD-10: E11.9,  Dispense machine covered by insurance       Start Date: 4/8/2021  End Date: --    CETIRIZINE (ZYRTEC) 10 MG TABLET    Take 10 mg by mouth once daily.       Start Date: 5/29/2022 End Date: --    CHLORHEXIDINE (PERIDEX) 0.12 % SOLUTION    15 mLs 2 (two) times daily.       Start Date: 6/18/2022 End Date: --    DULAGLUTIDE (TRULICITY) 0.75 MG/0.5 ML PEN INJECTOR    Inject 0.75 mg into the skin every 7 days. Start 0.75 mg once weekly for 4 weeks. Then increase to 1.5 mg once weekly.       Start Date: 10/6/2022 End Date: --     DULAGLUTIDE (TRULICITY) 1.5 MG/0.5 ML PEN INJECTOR    Inject 1.5 mg into the skin every 7 days. Start 0.75 mg once weekly for 4 weeks. Then increase to 1.5 mg once weekly       Start Date: 10/6/2022 End Date: --    ERGOCALCIFEROL (ERGOCALCIFEROL) 50,000 UNIT CAP    Take 1 capsule (50,000 Units total) by mouth every 7 days.       Start Date: 3/19/2021 End Date: --    FLUTICASONE PROPIONATE (FLONASE) 50 MCG/ACTUATION NASAL SPRAY    1 spray (50 mcg total) by Each Nostril route once daily.       Start Date: 3/19/2021 End Date: --    GLIPIZIDE (GLUCOTROL) 10 MG TABLET    Start with one pill in the Am and add a second one PM if well tolerated       Start Date: 10/3/2022 End Date: --    IBUPROFEN (ADVIL,MOTRIN) 600 MG TABLET    Take 600 mg by mouth every 6 (six) hours as needed.       Start Date: 6/18/2022 End Date: --    LANCETS MISC    One lancets use 2 times a day to check blood glucose, ICD-10: E11.9       Start Date: 4/8/2021  End Date: --    LANCING DEVICE MISC    One device, used to check blood glucose, ICD-10: E11.9       Start Date: 4/8/2021  End Date: --    METFORMIN (GLUCOPHAGE-XR) 500 MG ER 24HR TABLET    Take 1 tablet (500 mg total) by mouth 2 (two) times daily with meals.       Start Date: 9/9/2022  End Date: 9/9/2023    OLOPATADINE (PATANASE) 0.6 % NASAL SPRAY    2 sprays by Each Nostril route 2 (two) times daily.       Start Date: 5/29/2022 End Date: --    VALSARTAN (DIOVAN) 80 MG TABLET    Take 1 tablet (80 mg total) by mouth once daily.       Start Date: 9/29/2022 End Date: --       Order(s) placed per written order guidelines: none    Please advise.

## 2022-10-19 NOTE — TELEPHONE ENCOUNTER
----- Message from Tristian Callahan sent at 10/19/2022  9:54 AM CDT -----  Type: RX Refill Request    Who Called: self    Have you contacted your pharmacy:yes    Refill or New Rx:refill    RX Name and Strength:metFORMIN (GLUCOPHAGE-XR) 500 MG ER 24hr tablet    Preferred Pharmacy with phone number:[id number] [place] [address] AT [address]   Phone:  [phone]  Fax:  [phone]      Local or Mail Order:local    Ordering Provider:Marywar    Would the patient rather a call back or a response via [facility]? call    Best Call Back Number:[phone] (home)       Additional Information: Pt would also like to know if diabetes machine can be replaced.

## 2022-11-02 DIAGNOSIS — E11.65 UNCONTROLLED TYPE 2 DIABETES MELLITUS WITH HYPERGLYCEMIA: ICD-10-CM

## 2022-11-02 RX ORDER — METFORMIN HYDROCHLORIDE 500 MG/1
500 TABLET, EXTENDED RELEASE ORAL 2 TIMES DAILY WITH MEALS
Qty: 180 TABLET | Refills: 2 | Status: SHIPPED | OUTPATIENT
Start: 2022-11-02 | End: 2023-01-19

## 2022-11-02 NOTE — TELEPHONE ENCOUNTER
----- Message from Luisa Tellez sent at 11/2/2022 11:11 AM CDT -----  Regarding: Requesting Refill  .Type: RX Refill Request    Who Called:  self     Have you contacted your pharmacy: no     Refill or New Rx: Refill      RX Name and Strength: metFORMIN (GLUCOPHAGE-XR) 500 MG ER 24hr tablet     Preferred Pharmacy with phone number: .  [id number] [place] [address]  [address] [place] [address] [address]  [facility] [place] [address]  Phone: [phone] Fax: [phone]    Local or Mail Order:local     Ordering Provider: Isabel     Would the patient rather a call back or a response via [facility]?     Best Call Back Number: .[phone]    Additional Information:  on last dose of meds

## 2022-12-07 ENCOUNTER — OFFICE VISIT (OUTPATIENT)
Dept: ENDOCRINOLOGY | Facility: CLINIC | Age: 62
End: 2022-12-07
Payer: COMMERCIAL

## 2022-12-07 VITALS
BODY MASS INDEX: 35.75 KG/M2 | DIASTOLIC BLOOD PRESSURE: 81 MMHG | TEMPERATURE: 98 F | WEIGHT: 177 LBS | SYSTOLIC BLOOD PRESSURE: 140 MMHG | HEART RATE: 93 BPM

## 2022-12-07 DIAGNOSIS — I10 ESSENTIAL HYPERTENSION: ICD-10-CM

## 2022-12-07 DIAGNOSIS — E11.65 UNCONTROLLED TYPE 2 DIABETES MELLITUS WITH HYPERGLYCEMIA: Primary | ICD-10-CM

## 2022-12-07 DIAGNOSIS — E66.01 SEVERE OBESITY (BMI 35.0-39.9) WITH COMORBIDITY: ICD-10-CM

## 2022-12-07 DIAGNOSIS — E78.5 HYPERLIPIDEMIA, UNSPECIFIED HYPERLIPIDEMIA TYPE: ICD-10-CM

## 2022-12-07 PROCEDURE — 3061F NEG MICROALBUMINURIA REV: CPT | Mod: CPTII,S$GLB,, | Performed by: NURSE PRACTITIONER

## 2022-12-07 PROCEDURE — 3079F DIAST BP 80-89 MM HG: CPT | Mod: CPTII,S$GLB,, | Performed by: NURSE PRACTITIONER

## 2022-12-07 PROCEDURE — 3072F PR LOW RISK FOR RETINOPATHY: ICD-10-PCS | Mod: CPTII,S$GLB,, | Performed by: NURSE PRACTITIONER

## 2022-12-07 PROCEDURE — 3066F PR DOCUMENTATION OF TREATMENT FOR NEPHROPATHY: ICD-10-PCS | Mod: CPTII,S$GLB,, | Performed by: NURSE PRACTITIONER

## 2022-12-07 PROCEDURE — 3077F SYST BP >= 140 MM HG: CPT | Mod: CPTII,S$GLB,, | Performed by: NURSE PRACTITIONER

## 2022-12-07 PROCEDURE — 3046F HEMOGLOBIN A1C LEVEL >9.0%: CPT | Mod: CPTII,S$GLB,, | Performed by: NURSE PRACTITIONER

## 2022-12-07 PROCEDURE — 3008F PR BODY MASS INDEX (BMI) DOCUMENTED: ICD-10-PCS | Mod: CPTII,S$GLB,, | Performed by: NURSE PRACTITIONER

## 2022-12-07 PROCEDURE — 99999 PR PBB SHADOW E&M-EST. PATIENT-LVL III: CPT | Mod: PBBFAC,,, | Performed by: NURSE PRACTITIONER

## 2022-12-07 PROCEDURE — 99214 PR OFFICE/OUTPT VISIT, EST, LEVL IV, 30-39 MIN: ICD-10-PCS | Mod: S$GLB,,, | Performed by: NURSE PRACTITIONER

## 2022-12-07 PROCEDURE — 3079F PR MOST RECENT DIASTOLIC BLOOD PRESSURE 80-89 MM HG: ICD-10-PCS | Mod: CPTII,S$GLB,, | Performed by: NURSE PRACTITIONER

## 2022-12-07 PROCEDURE — 3077F PR MOST RECENT SYSTOLIC BLOOD PRESSURE >= 140 MM HG: ICD-10-PCS | Mod: CPTII,S$GLB,, | Performed by: NURSE PRACTITIONER

## 2022-12-07 PROCEDURE — 4010F ACE/ARB THERAPY RXD/TAKEN: CPT | Mod: CPTII,S$GLB,, | Performed by: NURSE PRACTITIONER

## 2022-12-07 PROCEDURE — 3066F NEPHROPATHY DOC TX: CPT | Mod: CPTII,S$GLB,, | Performed by: NURSE PRACTITIONER

## 2022-12-07 PROCEDURE — 4010F PR ACE/ARB THEARPY RXD/TAKEN: ICD-10-PCS | Mod: CPTII,S$GLB,, | Performed by: NURSE PRACTITIONER

## 2022-12-07 PROCEDURE — 3072F LOW RISK FOR RETINOPATHY: CPT | Mod: CPTII,S$GLB,, | Performed by: NURSE PRACTITIONER

## 2022-12-07 PROCEDURE — 99999 PR PBB SHADOW E&M-EST. PATIENT-LVL III: ICD-10-PCS | Mod: PBBFAC,,, | Performed by: NURSE PRACTITIONER

## 2022-12-07 PROCEDURE — 3008F BODY MASS INDEX DOCD: CPT | Mod: CPTII,S$GLB,, | Performed by: NURSE PRACTITIONER

## 2022-12-07 PROCEDURE — 3046F PR MOST RECENT HEMOGLOBIN A1C LEVEL > 9.0%: ICD-10-PCS | Mod: CPTII,S$GLB,, | Performed by: NURSE PRACTITIONER

## 2022-12-07 PROCEDURE — 99214 OFFICE O/P EST MOD 30 MIN: CPT | Mod: S$GLB,,, | Performed by: NURSE PRACTITIONER

## 2022-12-07 PROCEDURE — 3061F PR NEG MICROALBUMINURIA RESULT DOCUMENTED/REVIEW: ICD-10-PCS | Mod: CPTII,S$GLB,, | Performed by: NURSE PRACTITIONER

## 2022-12-07 RX ORDER — LANCETS 33 GAUGE
EACH MISCELLANEOUS
Qty: 200 EACH | Refills: 3 | Status: SHIPPED | OUTPATIENT
Start: 2022-12-07 | End: 2023-04-11 | Stop reason: SDUPTHER

## 2022-12-07 RX ORDER — ROSUVASTATIN CALCIUM 20 MG/1
20 TABLET, COATED ORAL DAILY
Qty: 90 TABLET | Refills: 0 | Status: SHIPPED | OUTPATIENT
Start: 2022-12-07 | End: 2023-01-10

## 2022-12-07 RX ORDER — GLIPIZIDE 10 MG/1
10 TABLET, FILM COATED, EXTENDED RELEASE ORAL
Qty: 90 TABLET | Refills: 1 | Status: SHIPPED | OUTPATIENT
Start: 2022-12-07 | End: 2023-02-24

## 2022-12-07 RX ORDER — DULAGLUTIDE 1.5 MG/.5ML
1.5 INJECTION, SOLUTION SUBCUTANEOUS
Qty: 4 PEN | Refills: 5 | Status: SHIPPED | OUTPATIENT
Start: 2022-12-07 | End: 2023-02-24

## 2022-12-07 RX ORDER — DEXTROSE 4 G
TABLET,CHEWABLE ORAL
Qty: 1 EACH | Refills: 0 | Status: SHIPPED | OUTPATIENT
Start: 2022-12-07 | End: 2023-08-03

## 2022-12-07 NOTE — PROGRESS NOTES
CC: This 62 y.o. Black or  female  is here for evaluation of  T2DM along with comorbidities indicated in the Visit Diagnosis section of this encounter.    HPI: Joanie Chandler was diagnosed with T2DM in ~ 2010.     DM COMPLICATIONS: none    Initial visit 10/6/22  Last seen by Dr. Barbosa in 4/2021. New to me.   A1c remains high, from 12% at lov to now 12.2%.   She has not been taking care of herself, focusing more on her sister with advanced cancer.  She was missing a lot of med doses. She is now trying to incorporate more vegetables and has improved med adherence.   Tries to eat small portions. No longer having diarrhea now that she is taking metformin  mg bid   Plan   See Diabetes Education. Call Scheduling department for Diabetes Education at [phone].  - pt will schedule herself.   Continue metformin and glipizide.   Start Trulicity 0.75 mg once weekly for 4 weeks. Then increase to Trulicity 1.5 mg once weekly. Reviewed MOA, side effects (nausea, diarrhea, constipation), and administration.   Test blood sugar 2x/day - fasting and again 2 hours after a meal. Keep a blood sugar log. Return to clinic in 6 weeks.     Interval history  She has started Trulicity. Tolerating it well. Denies n/v, constipation, or diarrhea.   No change in appetite.   Not able to keep her appt with educator d/t work.   She is doing much better with medication adherence.    Pt reports that atorvastatin was making her face swell so she stopped it.     LAST DIABETES EDUCATION: never     HOSPITALIZED FOR DIABETES -  No.    SIGNIFICANT DIABETES MED HISTORY:  Metformin instant release - jose    PRESCRIBED DIABETES MEDICATIONS:   Metformin  mg bid   Glipizide 10 mg bid   Trulicity 1.5 mg once weekly on Fridays     Misses medication doses - has missed Trulicity 1x      SELF MONITORING BLOOD GLUCOSE: Checks blood glucose at home - none.   Needs a new meter.       HYPOGLYCEMIC EPISODES: denies symptoms.       CURRENT  DIET: no sodas. Drinks water. Eats 3 meals/day.   Has tried to lower carb intake.   Occasional snacks - peanut butter crackers, yogurt.       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 No acute distress.   PSYCH: AAOx3, appropriate mood and affect, conversant, well-groomed. Judgement and insight good.   NEURO: Cranial nerves grossly intact. Speech clear, no tremor.   CHEST: Respirations even and unlabored.   SKIN: Normal skin turgor. Skin warm and dry. No areas of breakdown. No acanthosis nigricans.      Hemoglobin A1C   Date Value Ref Range Status   09/29/2022 12.2 (H) 4.0 - 5.6 % Final     Comment:     ADA Screening Guidelines:  5.7-6.4%  Consistent with prediabetes  >or=6.5%  Consistent with diabetes    High levels of fetal hemoglobin interfere with the HbA1C  assay. Heterozygous hemoglobin variants (HbS, HgC, etc)do  not significantly interfere with this assay.   However, presence of multiple variants may affect accuracy.     03/11/2021 12.0 (H) 4.0 - 5.6 % Final     Comment:     ADA Screening Guidelines:  5.7-6.4%  Consistent with prediabetes  >or=6.5%  Consistent with diabetes    High levels of fetal hemoglobin interfere with the HbA1C  assay. Heterozygous hemoglobin variants (HbS, HgC, etc)do  not significantly interfere with this assay.   However, presence of multiple variants may affect accuracy.     02/14/2020 10.4 (H) 4.0 - 5.6 % Final     Comment:     ADA Screening Guidelines:  5.7-6.4%  Consistent with prediabetes  >or=6.5%  Consistent with diabetes  High levels of fetal hemoglobin interfere with the HbA1C  assay. Heterozygous hemoglobin variants (HbS, HgC, etc)do  not significantly interfere with this assay.    However, presence of multiple variants may affect accuracy.         No results found for: CPEPTIDE, GLUTAMICACID, ISLETCELLANT, FRUCTOSAMINE     Lab Results   Component Value Date    CHOL 185 09/29/2022    CHOL 221 (H) 03/11/2021    CHOL 232 (H) 02/14/2020     Lab Results   Component Value Date    HDL 55 09/29/2022    HDL 56 03/11/2021    HDL 57 02/14/2020     Lab Results   Component Value Date    LDLCALC 110.6 09/29/2022    LDLCALC 143.2 03/11/2021    LDLCALC 152.4 02/14/2020     Lab Results   Component Value Date    TRIG 97 09/29/2022    TRIG 109 03/11/2021    TRIG 113 02/14/2020     Lab Results   Component Value Date    CHOLHDL 29.7 09/29/2022    CHOLHDL 25.3 03/11/2021    CHOLHDL 24.6 02/14/2020         Chemistry        Component Value Date/Time     09/29/2022 1425    K 3.9 09/29/2022 1425     09/29/2022 1425    CO2 27 09/29/2022 1425    BUN 10 09/29/2022 1425    CREATININE 0.8 09/29/2022 1425     (H) 09/29/2022 1425        Component Value Date/Time    CALCIUM 10.0 09/29/2022 1425    ALKPHOS 119 09/29/2022 1425    AST 13 09/29/2022 1425    ALT 15 09/29/2022 1425    BILITOT 0.3 09/29/2022 1425    ESTGFRAFRICA >60 03/11/2021 1108    EGFRNONAA >60 03/11/2021 1108              Lab Results   Component Value Date    LABMICR 6.0 09/29/2022    CREATRANDUR 109.0 09/29/2022    MICALBCREAT 5.5 09/29/2022             ASSESSMENT and PLAN:    A1C GOAL: < 7 %       1. Uncontrolled type 2 diabetes mellitus with hyperglycemia  Continue metformin and Trulicity.   Change from glipizide 10 mg tablet twice daily to glipizide XL 10 mg once daily before breakfast.   Unable to evaluate glucoses due to lack of data.   Prescription for testing supplies sent to pharmacy. Contact clinic if unable to get supplies.   Patient will schedule visit with diabetes educator.   Return to clinic in 2 months with labs prior.     blood-glucose meter kit    blood sugar diagnostic Strp    lancets Misc    dulaglutide (TRULICITY) 1.5  mg/0.5 mL pen injector    Hemoglobin A1C      2. Essential hypertension  BP improved from 159/91 to 140/81 upon repeat. She just took her meds prior to visit.       3. Hyperlipidemia, unspecified hyperlipidemia type  Change from atorvastatin 40 mg to rosuvastatin (CRESTOR) 20 MG tablet    Lipid Panel      4. Severe obesity (BMI 35.0-39.9) with comorbidity  dulaglutide (TRULICITY) 1.5 mg/0.5 mL pen injector        Orders Placed This Encounter   Procedures    Hemoglobin A1C     Standing Status:   Future     Standing Expiration Date:   2/5/2024    Lipid Panel     Standing Status:   Future     Standing Expiration Date:   12/7/2023          Follow up in about 2 months (around 2/7/2023).

## 2022-12-07 NOTE — PATIENT INSTRUCTIONS
Continue metformin and Trulicity.   Change from glipizide 10 mg tablet twice daily to glipizide XL 10 mg once daily before breakfast.   Unable to evaluate glucoses due to lack of data.   Prescription for testing supplies sent to pharmacy. Contact clinic if unable to get supplies.   Patient will schedule visit with diabetes educator.   Return to clinic in 2 months with labs prior.

## 2022-12-19 ENCOUNTER — PATIENT OUTREACH (OUTPATIENT)
Dept: ADMINISTRATIVE | Facility: HOSPITAL | Age: 62
End: 2022-12-19
Payer: COMMERCIAL

## 2023-01-19 ENCOUNTER — TELEPHONE (OUTPATIENT)
Dept: FAMILY MEDICINE | Facility: CLINIC | Age: 63
End: 2023-01-19
Payer: COMMERCIAL

## 2023-01-19 DIAGNOSIS — E11.65 UNCONTROLLED TYPE 2 DIABETES MELLITUS WITH HYPERGLYCEMIA: ICD-10-CM

## 2023-01-19 RX ORDER — METFORMIN HYDROCHLORIDE 500 MG/1
500 TABLET, EXTENDED RELEASE ORAL 2 TIMES DAILY WITH MEALS
Qty: 180 TABLET | Refills: 2 | Status: SHIPPED | OUTPATIENT
Start: 2023-01-19 | End: 2023-11-03 | Stop reason: SDUPTHER

## 2023-01-19 NOTE — TELEPHONE ENCOUNTER
[facility]MIN (GLUCOPHAGE-XR) 500 MG ER 24hr tablet. Drug not covered by patient plan ; alternative is metformin HCL, pr metformin HCL ER.    Self

## 2023-02-01 ENCOUNTER — NURSE TRIAGE (OUTPATIENT)
Dept: ADMINISTRATIVE | Facility: CLINIC | Age: 63
End: 2023-02-01
Payer: COMMERCIAL

## 2023-02-01 NOTE — TELEPHONE ENCOUNTER
Spoke with patient states she has been itching since Saturday (on and off).  Patient states she is mainly itching on her abdomen and arms.  Patient states she takes trulicity and is not sure if this is causing her symptoms.  Patient started taking trulicity a couple of months ago.  Advised patient to be seen in clinic today. Offered to make an appointment with PCP patient stated she could not come in today.  Offered [facility].  Patient states she will go to an Urgent Care.  Advised patient to call back with worsening symptoms.  Patient verbalized understanding.   Reason for Disposition   MODERATE-SEVERE widespread itching (i.e., interferes with sleep, normal activities or school) and not improved after 24 hours of itching Care Advice     Stated her symptoms are intermittent    Additional Information   Negative: Life-threatening reaction in the past to similar substance (e.g., food, insect bite/sting, medication, etc.) and < 2 hours since exposure   Negative: Wheezing, stridor, hoarseness, or difficulty breathing   Negative: Tightness in the chest or throat and begins within 2 hours of exposure to allergic substance   Negative: Difficulty swallowing, drooling, or slurred speech   Negative: Difficult to awaken or acting confused (e.g., disoriented, slurred speech)   Negative: Unresponsive, passed out, or very weak   Negative: Other symptom of severe allergic reaction (Exception: Hives or facial swelling alone)   Negative: Sounds like a life-threatening emergency to the triager   Negative: Widespread hives, itching or facial swelling and onset < 2 hours of exposure to high-risk allergen (e.g., sting, nuts, 1st dose of antibiotic)   Negative: Vomiting or abdominal cramps and onset < 2 hours of exposure to high-risk allergen (e.g., sting, nuts, 1st dose of antibiotic)   Negative: Gave epinephrine shot and no symptoms now   Negative: Gave asthma inhaler or neb and no symptoms now   Negative: Patient wants to be seen    Negative: Took antihistamine (e.g., Benadryl) by mouth and no symptoms now   Negative: Life-threatening reaction (anaphylaxis) in the past to similar substance (e.g., food, insect bite/sting, chemical, etc.) and < 2 hours since exposure   Negative: Difficulty breathing or wheezing   Negative: Difficulty swallowing or slurred speech and sudden onset   Negative: Sounds like a life-threatening emergency to the triager   Negative: Patient sounds very sick or weak to the triager    Protocols used: [id number], Itching - Widespread-A-OH

## 2023-02-17 ENCOUNTER — LAB VISIT (OUTPATIENT)
Dept: LAB | Facility: HOSPITAL | Age: 63
End: 2023-02-17
Attending: INTERNAL MEDICINE
Payer: COMMERCIAL

## 2023-02-17 DIAGNOSIS — E78.5 HYPERLIPIDEMIA, UNSPECIFIED HYPERLIPIDEMIA TYPE: ICD-10-CM

## 2023-02-17 DIAGNOSIS — E11.65 UNCONTROLLED TYPE 2 DIABETES MELLITUS WITH HYPERGLYCEMIA: ICD-10-CM

## 2023-02-17 LAB
CHOLEST SERPL-MCNC: 125 MG/DL (ref 120–199)
CHOLEST/HDLC SERPL: 2.5 {RATIO} (ref 2–5)
ESTIMATED AVG GLUCOSE: 143 MG/DL (ref 68–131)
HBA1C MFR BLD: 6.6 % (ref 4–5.6)
HDLC SERPL-MCNC: 50 MG/DL (ref 40–75)
HDLC SERPL: 40 % (ref 20–50)
LDLC SERPL CALC-MCNC: 64.4 MG/DL (ref 63–159)
NONHDLC SERPL-MCNC: 75 MG/DL
TRIGL SERPL-MCNC: 53 MG/DL (ref 30–150)

## 2023-02-17 PROCEDURE — 83036 HEMOGLOBIN GLYCOSYLATED A1C: CPT | Performed by: NURSE PRACTITIONER

## 2023-02-17 PROCEDURE — 80061 LIPID PANEL: CPT | Performed by: NURSE PRACTITIONER

## 2023-02-17 PROCEDURE — 36415 COLL VENOUS BLD VENIPUNCTURE: CPT | Mod: PO | Performed by: NURSE PRACTITIONER

## 2023-02-24 ENCOUNTER — OFFICE VISIT (OUTPATIENT)
Dept: ENDOCRINOLOGY | Facility: CLINIC | Age: 63
End: 2023-02-24
Payer: COMMERCIAL

## 2023-02-24 VITALS
BODY MASS INDEX: 34.74 KG/M2 | SYSTOLIC BLOOD PRESSURE: 138 MMHG | DIASTOLIC BLOOD PRESSURE: 86 MMHG | TEMPERATURE: 99 F | HEART RATE: 78 BPM | WEIGHT: 172 LBS

## 2023-02-24 DIAGNOSIS — E11.9 TYPE 2 DIABETES MELLITUS WITHOUT COMPLICATION, WITHOUT LONG-TERM CURRENT USE OF INSULIN: Primary | ICD-10-CM

## 2023-02-24 DIAGNOSIS — E78.5 HYPERLIPIDEMIA, UNSPECIFIED HYPERLIPIDEMIA TYPE: ICD-10-CM

## 2023-02-24 DIAGNOSIS — I10 ESSENTIAL HYPERTENSION: ICD-10-CM

## 2023-02-24 DIAGNOSIS — E66.01 SEVERE OBESITY (BMI 35.0-39.9) WITH COMORBIDITY: ICD-10-CM

## 2023-02-24 PROCEDURE — 4010F ACE/ARB THERAPY RXD/TAKEN: CPT | Mod: CPTII,S$GLB,, | Performed by: NURSE PRACTITIONER

## 2023-02-24 PROCEDURE — 99999 PR PBB SHADOW E&M-EST. PATIENT-LVL III: ICD-10-PCS | Mod: PBBFAC,,, | Performed by: NURSE PRACTITIONER

## 2023-02-24 PROCEDURE — 1160F RVW MEDS BY RX/DR IN RCRD: CPT | Mod: CPTII,S$GLB,, | Performed by: NURSE PRACTITIONER

## 2023-02-24 PROCEDURE — 1159F PR MEDICATION LIST DOCUMENTED IN MEDICAL RECORD: ICD-10-PCS | Mod: CPTII,S$GLB,, | Performed by: NURSE PRACTITIONER

## 2023-02-24 PROCEDURE — 3079F PR MOST RECENT DIASTOLIC BLOOD PRESSURE 80-89 MM HG: ICD-10-PCS | Mod: CPTII,S$GLB,, | Performed by: NURSE PRACTITIONER

## 2023-02-24 PROCEDURE — 99214 OFFICE O/P EST MOD 30 MIN: CPT | Mod: S$GLB,,, | Performed by: NURSE PRACTITIONER

## 2023-02-24 PROCEDURE — 3044F HG A1C LEVEL LT 7.0%: CPT | Mod: CPTII,S$GLB,, | Performed by: NURSE PRACTITIONER

## 2023-02-24 PROCEDURE — 3079F DIAST BP 80-89 MM HG: CPT | Mod: CPTII,S$GLB,, | Performed by: NURSE PRACTITIONER

## 2023-02-24 PROCEDURE — 1159F MED LIST DOCD IN RCRD: CPT | Mod: CPTII,S$GLB,, | Performed by: NURSE PRACTITIONER

## 2023-02-24 PROCEDURE — 4010F PR ACE/ARB THEARPY RXD/TAKEN: ICD-10-PCS | Mod: CPTII,S$GLB,, | Performed by: NURSE PRACTITIONER

## 2023-02-24 PROCEDURE — 1160F PR REVIEW ALL MEDS BY PRESCRIBER/CLIN PHARMACIST DOCUMENTED: ICD-10-PCS | Mod: CPTII,S$GLB,, | Performed by: NURSE PRACTITIONER

## 2023-02-24 PROCEDURE — 3075F SYST BP GE 130 - 139MM HG: CPT | Mod: CPTII,S$GLB,, | Performed by: NURSE PRACTITIONER

## 2023-02-24 PROCEDURE — 3008F PR BODY MASS INDEX (BMI) DOCUMENTED: ICD-10-PCS | Mod: CPTII,S$GLB,, | Performed by: NURSE PRACTITIONER

## 2023-02-24 PROCEDURE — 99214 PR OFFICE/OUTPT VISIT, EST, LEVL IV, 30-39 MIN: ICD-10-PCS | Mod: S$GLB,,, | Performed by: NURSE PRACTITIONER

## 2023-02-24 PROCEDURE — 3044F PR MOST RECENT HEMOGLOBIN A1C LEVEL <7.0%: ICD-10-PCS | Mod: CPTII,S$GLB,, | Performed by: NURSE PRACTITIONER

## 2023-02-24 PROCEDURE — 3008F BODY MASS INDEX DOCD: CPT | Mod: CPTII,S$GLB,, | Performed by: NURSE PRACTITIONER

## 2023-02-24 PROCEDURE — 99999 PR PBB SHADOW E&M-EST. PATIENT-LVL III: CPT | Mod: PBBFAC,,, | Performed by: NURSE PRACTITIONER

## 2023-02-24 PROCEDURE — 3075F PR MOST RECENT SYSTOLIC BLOOD PRESS GE 130-139MM HG: ICD-10-PCS | Mod: CPTII,S$GLB,, | Performed by: NURSE PRACTITIONER

## 2023-02-24 RX ORDER — GLIPIZIDE 10 MG/1
10 TABLET, FILM COATED, EXTENDED RELEASE ORAL
Qty: 90 TABLET | Refills: 1 | Status: CANCELLED | OUTPATIENT
Start: 2023-02-24 | End: 2024-02-24

## 2023-02-24 RX ORDER — DULAGLUTIDE 1.5 MG/.5ML
1.5 INJECTION, SOLUTION SUBCUTANEOUS
Qty: 4 PEN | Refills: 5 | Status: CANCELLED | OUTPATIENT
Start: 2023-02-24

## 2023-02-24 RX ORDER — DULAGLUTIDE 4.5 MG/.5ML
4.5 INJECTION, SOLUTION SUBCUTANEOUS
Qty: 4 PEN | Refills: 3 | Status: SHIPPED | OUTPATIENT
Start: 2023-02-24 | End: 2023-04-26

## 2023-02-24 RX ORDER — ROSUVASTATIN CALCIUM 20 MG/1
20 TABLET, COATED ORAL DAILY
Qty: 90 TABLET | Refills: 2 | Status: SHIPPED | OUTPATIENT
Start: 2023-02-24 | End: 2023-04-11

## 2023-02-24 NOTE — PROGRESS NOTES
CC: This 62 y.o. Black or  female  is here for evaluation of  T2DM along with comorbidities indicated in the Visit Diagnosis section of this encounter.    HPI: Joanie Chandler was diagnosed with T2DM in ~ 2010.     DM COMPLICATIONS: none      Prior visit 12/2022  She has started Trulicity. Tolerating it well. Denies n/v, constipation, or diarrhea.   No change in appetite.   Not able to keep her appt with educator d/t work.   She is doing much better with medication adherence.  Pt reports that atorvastatin was making her face swell so she stopped it.   Plan Continue metformin and Trulicity.   Change from glipizide 10 mg tablet twice daily to glipizide XL 10 mg once daily before breakfast.   Unable to evaluate glucoses due to lack of data.   Prescription for testing supplies sent to pharmacy. Contact clinic if unable to get supplies.   Patient will schedule visit with diabetes educator.   Return to clinic in 2 months with labs prior.     Interval hx  A1c is down from 12.2% in Sept to now 6.6%.   5 lb weight loss since lov.   She's feeling a lot better.   She has switched from atorvastatin to rosuvastatin. LDL cholesterol down from 110 to 64. Denies myalgias.       LAST DIABETES EDUCATION: never     HOSPITALIZED FOR DIABETES -  No.    SIGNIFICANT DIABETES MED HISTORY:  Metformin instant release - darrhea  Trulicity started 10/2022    PRESCRIBED DIABETES MEDICATIONS:   Metformin  mg bid   Glipizide XL 10 mg once daily before breakfast    Trulicity 1.5 mg once weekly on Fridays     Misses medication doses - has missed meds 1x since lov.       SELF MONITORING BLOOD GLUCOSE: Checks blood glucose at home - 2x/day ac/pc. Forgot her log. Pt has difficulty recalling BG values.   Morning glucoses 70-140s, mostly 90s.   2 hours after lunch ?     HYPOGLYCEMIC EPISODES: denies symptoms at BGs in the 70s. Denies overnight symptoms.    Reports BG drops to 70s infrequently, 1x/month.      CURRENT DIET: Drinks  water, crystal lite. Eats 3 meals/day.   Limits fried foods.       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     PHYSICAL EXAM:  GENERAL: Well developed, well nourished. No acute distress.   PSYCH: AAOx3, appropriate mood and affect, conversant, well-groomed. Judgement and insight good.   NEURO: Cranial nerves grossly intact. Speech clear, no tremor.   CHEST: Respirations even and unlabored.   SKIN: Normal skin turgor. Skin warm and dry. No areas of breakdown. No acanthosis nigricans.      Hemoglobin A1C   Date Value Ref Range Status   02/17/2023 6.6 (H) 4.0 - 5.6 % Final     Comment:     ADA Screening Guidelines:  5.7-6.4%  Consistent with prediabetes  >or=6.5%  Consistent with diabetes    High levels of fetal hemoglobin interfere with the HbA1C  assay. Heterozygous hemoglobin variants (HbS, HgC, etc)do  not significantly interfere with this assay.   However, presence of multiple variants may affect accuracy.     09/29/2022 12.2 (H) 4.0 - 5.6 % Final     Comment:     ADA Screening Guidelines:  5.7-6.4%  Consistent with prediabetes  >or=6.5%  Consistent with diabetes    High levels of fetal hemoglobin interfere with the HbA1C  assay. Heterozygous hemoglobin variants (HbS, HgC, etc)do  not significantly interfere with this assay.   However, presence of multiple variants may affect accuracy.     03/11/2021 12.0 (H) 4.0 - 5.6 % Final     Comment:     ADA Screening Guidelines:  5.7-6.4%  Consistent with prediabetes  >or=6.5%  Consistent with diabetes    High levels of fetal hemoglobin interfere with the HbA1C  assay. Heterozygous hemoglobin variants (HbS, HgC, etc)do  not significantly interfere with this assay.   However, presence of multiple variants may affect accuracy.          No results found for: CPEPTIDE, GLUTAMICACID, ISLETCELLANT, FRUCTOSAMINE     Lab Results   Component Value Date    CHOL 125 02/17/2023    CHOL 185 09/29/2022    CHOL 221 (H) 03/11/2021     Lab Results   Component Value Date    HDL 50 02/17/2023    HDL 55 09/29/2022    HDL 56 03/11/2021     Lab Results   Component Value Date    LDLCALC 64.4 02/17/2023    LDLCALC 110.6 09/29/2022    LDLCALC 143.2 03/11/2021     Lab Results   Component Value Date    TRIG 53 02/17/2023    TRIG 97 09/29/2022    TRIG 109 03/11/2021     Lab Results   Component Value Date    CHOLHDL 40.0 02/17/2023    CHOLHDL 29.7 09/29/2022    CHOLHDL 25.3 03/11/2021         Chemistry        Component Value Date/Time     09/29/2022 1425    K 3.9 09/29/2022 1425     09/29/2022 1425    CO2 27 09/29/2022 1425    BUN 10 09/29/2022 1425    CREATININE 0.8 09/29/2022 1425     (H) 09/29/2022 1425        Component Value Date/Time    CALCIUM 10.0 09/29/2022 1425    ALKPHOS 119 09/29/2022 1425    AST 13 09/29/2022 1425    ALT 15 09/29/2022 1425    BILITOT 0.3 09/29/2022 1425    ESTGFRAFRICA >60 03/11/2021 1108    EGFRNONAA >60 03/11/2021 1108              Lab Results   Component Value Date    LABMICR 6.0 09/29/2022    CREATRANDUR 109.0 09/29/2022    MICALBCREAT 5.5 09/29/2022             ASSESSMENT and PLAN:    A1C GOAL: < 7 %     1. Type 2 diabetes mellitus without complication, without long-term current use of insulin  A1c is at goal and patient denies hypoglycemia.   Will try to further optimize medication regimen by promoting more weight loss.    Finish out current supply of Trulicity (3 more pens). Then increase Trulicity to 4.5 mg once weekly. - potential side effects: nausea, diarrhea, constipation.   Stop glipizide due to risk of hypoglycemia when Trulicity 4.5 mg is started.    Continue metformin 500 mg twice daily.     Test blood sugar 2x/day, before breakfast and again 2 hours after a meal. Keep a sugar log.     If blood sugars are  too high, we may need to add in Jardiance once daily.     Return to clinic in 3 months with labs prior.     Hemoglobin A1C      2. Hyperlipidemia, unspecified hyperlipidemia type  Lipid profiles at goal.         3. Severe obesity (BMI 35.0-39.9) with comorbidity  Start Trulicity       4. Essential hypertension  Continue tx             Orders Placed This Encounter   Procedures    Hemoglobin A1C     Standing Status:   Future     Standing Expiration Date:   4/24/2024          Follow up in about 3 months (around 5/24/2023).

## 2023-02-24 NOTE — PATIENT INSTRUCTIONS
A1C goal: <7%  Fasting/premeal blood glucose goal:   2 hour post-meal blood glucose goal: less than 180      A1c is at goal and patient denies hypoglycemia.   Will try to further optimize medication regimen by promoting more weight loss.    Finish out current supply of Trulicity (3 more pens). Then increase Trulicity to 4.5 mg once weekly. - potential side effects: nausea, diarrhea, constipation.   Stop glipizide due to risk of hypoglycemia when Trulicity 4.5 mg is started.    Continue metformin 500 mg twice daily.     Test blood sugar 2x/day, before breakfast and again 2 hours after a meal. Keep a sugar log.     If blood sugars are too high, we may need to add in Jardiance once daily.     Return to clinic in 3 months with labs prior.

## 2023-03-09 ENCOUNTER — PATIENT OUTREACH (OUTPATIENT)
Dept: ADMINISTRATIVE | Facility: HOSPITAL | Age: 63
End: 2023-03-09
Payer: COMMERCIAL

## 2023-03-09 NOTE — PROGRESS NOTES
HM and immunization's reviewed and updated. Due for cscope and eye exam. Pt will call back when ready to schedule eye visit. Requesting a call back tomorrow regarding cscope due to patient being at work.

## 2023-03-28 ENCOUNTER — PATIENT OUTREACH (OUTPATIENT)
Dept: ADMINISTRATIVE | Facility: HOSPITAL | Age: 63
End: 2023-03-28
Payer: COMMERCIAL

## 2023-03-28 DIAGNOSIS — Z12.12 ENCOUNTER FOR COLORECTAL CANCER SCREENING: Primary | ICD-10-CM

## 2023-03-28 DIAGNOSIS — Z12.11 ENCOUNTER FOR COLORECTAL CANCER SCREENING: Primary | ICD-10-CM

## 2023-03-28 NOTE — PROGRESS NOTES
HM and immunization's reviewed and updated. Due for cscope. Pt requesting for visit. Scheduled. Unable to schedule PAT due to schedule close. Will call patient back 4/1 when schedule is open. Patient given eye department number to schedule with Dr Aldrich..

## 2023-03-29 DIAGNOSIS — E11.9 TYPE 2 DIABETES MELLITUS WITHOUT COMPLICATION, UNSPECIFIED WHETHER LONG TERM INSULIN USE: ICD-10-CM

## 2023-04-03 ENCOUNTER — PATIENT OUTREACH (OUTPATIENT)
Dept: ADMINISTRATIVE | Facility: HOSPITAL | Age: 63
End: 2023-04-03
Payer: COMMERCIAL

## 2023-04-11 ENCOUNTER — OFFICE VISIT (OUTPATIENT)
Dept: FAMILY MEDICINE | Facility: CLINIC | Age: 63
End: 2023-04-11
Payer: COMMERCIAL

## 2023-04-11 ENCOUNTER — PATIENT OUTREACH (OUTPATIENT)
Dept: ADMINISTRATIVE | Facility: HOSPITAL | Age: 63
End: 2023-04-11
Payer: COMMERCIAL

## 2023-04-11 VITALS
OXYGEN SATURATION: 97 % | RESPIRATION RATE: 16 BRPM | BODY MASS INDEX: 32.71 KG/M2 | HEIGHT: 59 IN | DIASTOLIC BLOOD PRESSURE: 70 MMHG | TEMPERATURE: 98 F | WEIGHT: 162.25 LBS | HEART RATE: 84 BPM | SYSTOLIC BLOOD PRESSURE: 138 MMHG

## 2023-04-11 DIAGNOSIS — I10 ESSENTIAL HYPERTENSION: Primary | ICD-10-CM

## 2023-04-11 DIAGNOSIS — E66.09 CLASS 1 OBESITY DUE TO EXCESS CALORIES WITH SERIOUS COMORBIDITY AND BODY MASS INDEX (BMI) OF 32.0 TO 32.9 IN ADULT: ICD-10-CM

## 2023-04-11 DIAGNOSIS — E11.65 TYPE 2 DIABETES MELLITUS WITH HYPERGLYCEMIA, WITHOUT LONG-TERM CURRENT USE OF INSULIN: ICD-10-CM

## 2023-04-11 DIAGNOSIS — R14.3 PASSING FLATUS: ICD-10-CM

## 2023-04-11 DIAGNOSIS — J30.1 SEASONAL ALLERGIC RHINITIS DUE TO POLLEN: ICD-10-CM

## 2023-04-11 PROCEDURE — 3044F PR MOST RECENT HEMOGLOBIN A1C LEVEL <7.0%: ICD-10-PCS | Mod: CPTII,S$GLB,, | Performed by: INTERNAL MEDICINE

## 2023-04-11 PROCEDURE — 99999 PR PBB SHADOW E&M-EST. PATIENT-LVL V: ICD-10-PCS | Mod: PBBFAC,,, | Performed by: INTERNAL MEDICINE

## 2023-04-11 PROCEDURE — 3044F HG A1C LEVEL LT 7.0%: CPT | Mod: CPTII,S$GLB,, | Performed by: INTERNAL MEDICINE

## 2023-04-11 PROCEDURE — 3078F PR MOST RECENT DIASTOLIC BLOOD PRESSURE < 80 MM HG: ICD-10-PCS | Mod: CPTII,S$GLB,, | Performed by: INTERNAL MEDICINE

## 2023-04-11 PROCEDURE — 4010F PR ACE/ARB THEARPY RXD/TAKEN: ICD-10-PCS | Mod: CPTII,S$GLB,, | Performed by: INTERNAL MEDICINE

## 2023-04-11 PROCEDURE — 3008F PR BODY MASS INDEX (BMI) DOCUMENTED: ICD-10-PCS | Mod: CPTII,S$GLB,, | Performed by: INTERNAL MEDICINE

## 2023-04-11 PROCEDURE — 1159F MED LIST DOCD IN RCRD: CPT | Mod: CPTII,S$GLB,, | Performed by: INTERNAL MEDICINE

## 2023-04-11 PROCEDURE — 99214 OFFICE O/P EST MOD 30 MIN: CPT | Mod: S$GLB,,, | Performed by: INTERNAL MEDICINE

## 2023-04-11 PROCEDURE — 3008F BODY MASS INDEX DOCD: CPT | Mod: CPTII,S$GLB,, | Performed by: INTERNAL MEDICINE

## 2023-04-11 PROCEDURE — 99999 PR PBB SHADOW E&M-EST. PATIENT-LVL V: CPT | Mod: PBBFAC,,, | Performed by: INTERNAL MEDICINE

## 2023-04-11 PROCEDURE — 1159F PR MEDICATION LIST DOCUMENTED IN MEDICAL RECORD: ICD-10-PCS | Mod: CPTII,S$GLB,, | Performed by: INTERNAL MEDICINE

## 2023-04-11 PROCEDURE — 3075F PR MOST RECENT SYSTOLIC BLOOD PRESS GE 130-139MM HG: ICD-10-PCS | Mod: CPTII,S$GLB,, | Performed by: INTERNAL MEDICINE

## 2023-04-11 PROCEDURE — 99214 PR OFFICE/OUTPT VISIT, EST, LEVL IV, 30-39 MIN: ICD-10-PCS | Mod: S$GLB,,, | Performed by: INTERNAL MEDICINE

## 2023-04-11 PROCEDURE — 3078F DIAST BP <80 MM HG: CPT | Mod: CPTII,S$GLB,, | Performed by: INTERNAL MEDICINE

## 2023-04-11 PROCEDURE — 3075F SYST BP GE 130 - 139MM HG: CPT | Mod: CPTII,S$GLB,, | Performed by: INTERNAL MEDICINE

## 2023-04-11 PROCEDURE — 4010F ACE/ARB THERAPY RXD/TAKEN: CPT | Mod: CPTII,S$GLB,, | Performed by: INTERNAL MEDICINE

## 2023-04-11 PROCEDURE — 1160F PR REVIEW ALL MEDS BY PRESCRIBER/CLIN PHARMACIST DOCUMENTED: ICD-10-PCS | Mod: CPTII,S$GLB,, | Performed by: INTERNAL MEDICINE

## 2023-04-11 PROCEDURE — 1160F RVW MEDS BY RX/DR IN RCRD: CPT | Mod: CPTII,S$GLB,, | Performed by: INTERNAL MEDICINE

## 2023-04-11 RX ORDER — FLUTICASONE PROPIONATE 50 MCG
1 SPRAY, SUSPENSION (ML) NASAL DAILY
Qty: 16 G | Refills: 0 | Status: SHIPPED | OUTPATIENT
Start: 2023-04-11

## 2023-04-11 RX ORDER — LANCETS 33 GAUGE
EACH MISCELLANEOUS
Qty: 200 EACH | Refills: 3 | Status: SHIPPED | OUTPATIENT
Start: 2023-04-11 | End: 2023-09-19

## 2023-04-11 RX ORDER — IBUPROFEN 600 MG/1
600 TABLET ORAL EVERY 6 HOURS PRN
Qty: 90 TABLET | Refills: 1 | Status: SHIPPED | OUTPATIENT
Start: 2023-04-11 | End: 2023-07-10

## 2023-04-11 NOTE — PROGRESS NOTES
Health Maintenance Due   Topic     TETANUS VACCINE      Colorectal Cancer Screening      Shingles Vaccine (1 of 2) hx chickenpox ; inform pt can get vaccine at pharmacy.    COVID-19 Vaccine (4 - Booster for Pfizer series)     Eye Exam

## 2023-04-11 NOTE — PROGRESS NOTES
HM and immunization's reviewed and updated. Unable to LM. Due for eye exam and need to schedule PAT for cscope.

## 2023-04-11 NOTE — PROGRESS NOTES
Subjective:       Patient ID: Joanie Chandler is a pleasant 62 y.o. Black or  female patient    Chief Complaint: Follow-up (Need something for gas and would like her right leg checked)      Patient is a pt I saw last on 03/19/2021, see my last notes.    HPI     From my last visit:  -  FOSTER Hall, Endocrinology for DM.  She reports feeling very well as per her report. She is pleased of her care by FOSTER Hall, is on metformin and Trulicity with good control of her DM and WL of 16 pounds from October.  She has more energy.  BS not checked today but was 124 yesterday, she checks BID usually.  She would like for me to check on a mass on her R thigh that she has been noticing after losing weight.  Also has tendency to pass flatus, not the whole time. No other GI issues.    Patient Active Problem List   Diagnosis    Essential hypertension    Pain of both breasts    Heart murmur    Arthritis of ankle    BMI 40.0-44.9, adult    Vitamin D deficiency    Hyperlipidemia LDL goal <70    Vaginal candidiasis    Refractive error          ACTIVE MEDICAL ISSUES:  Documented in Problem List     PAST MEDICAL HISTORY  Documented     PAST SURGICAL HISTORY:  Documented     SOCIAL HISTORY:  Documented     FAMILY HISTORY:  Documented     ALLERGIES AND MEDICATIONS: updated and reviewed.  Documented    Review of Systems   Constitutional: Negative.    HENT: Negative.     Respiratory: Negative.     Cardiovascular: Negative.    Gastrointestinal:         Passing flatus   Musculoskeletal:         Mass on R thigh   All other systems reviewed and are negative.    Objective:      Physical Exam  Vitals and nursing note reviewed.   Constitutional:       Appearance: Normal appearance. She is obese.   HENT:      Right Ear: Tympanic membrane normal.      Left Ear: Tympanic membrane normal.   Eyes:      Conjunctiva/sclera: Conjunctivae normal.   Cardiovascular:      Rate and Rhythm: Normal rate and regular rhythm.      Pulses: Normal pulses.     "  Heart sounds: Normal heart sounds.   Pulmonary:      Effort: Pulmonary effort is normal.      Breath sounds: Normal breath sounds.   Abdominal:      General: Bowel sounds are normal. There is no distension.      Palpations: Abdomen is soft. There is no mass.      Tenderness: There is no abdominal tenderness.   Musculoskeletal:      Cervical back: Normal range of motion.      Comments: 3/3 cm lipoma on R thigh   Lymphadenopathy:      Cervical: No cervical adenopathy.   Neurological:      Mental Status: She is alert.       Vitals:    04/11/23 1115   BP: 138/70   BP Location: Right arm   Patient Position: Sitting   BP Method: Large (Manual)   Pulse: 84   Resp: 16   Temp: 98.1 °F (36.7 °C)   TempSrc: Oral   SpO2: 97%   Weight: 73.6 kg (162 lb 4.1 oz)   Height: 4' 11" (1.499 m)     Body mass index is 32.77 kg/m².    RESULTS: Reviewed labs from last 12 months    Last Lab Results:     Lab Results   Component Value Date    WBC 8.33 09/29/2022    HGB 14.2 09/29/2022    HCT 45.9 09/29/2022     09/29/2022     09/29/2022    K 3.9 09/29/2022     09/29/2022    CO2 27 09/29/2022    BUN 10 09/29/2022    CREATININE 0.8 09/29/2022    CALCIUM 10.0 09/29/2022    ALBUMIN 3.8 09/29/2022    AST 13 09/29/2022    ALT 15 09/29/2022    CHOL 125 02/17/2023    TRIG 53 02/17/2023    HDL 50 02/17/2023    LDLCALC 64.4 02/17/2023    HGBA1C 6.6 (H) 02/17/2023    TSH 0.741 03/11/2021       Assessment:       1. Essential hypertension    2. Type 2 diabetes mellitus with hyperglycemia, without long-term current use of insulin    3. Class 1 obesity due to excess calories with serious comorbidity and body mass index (BMI) of 32.0 to 32.9 in adult    4. Seasonal allergic rhinitis due to pollen        Plan:   Joanie was seen today for follow-up.    Diagnoses and all orders for this visit:    Essential hypertension    BP at goal, same treatment.     Type 2 diabetes mellitus with hyperglycemia, without long-term current use of insulin  -   "   Ambulatory referral/consult to Optometry; Future  -     lancets 33 gauge Misc; Use 2 times a day to check blood glucose    Great response to treatment as per FOSTER Hall, A1C is at goal, pt reliable with medications, referral placed for eye exam.    Class 1 obesity due to excess calories with serious comorbidity and body mass index (BMI) of 32.0 to 32.9 in adult    See HPI. Lost weight with Trulicity. Discussed the importance to remain active not to lose muscles.    Seasonal allergic rhinitis due to pollen  -     fluticasone propionate (FLONASE) 50 mcg/actuation nasal spray; 1 spray (50 mcg total) by Each Nostril route once daily.    Refill provided.    Other orders  -     ibuprofen (ADVIL,MOTRIN) 600 MG tablet; Take 1 tablet (600 mg total) by mouth every 6 (six) hours as needed for Pain.    Takes it very sparingly.    Follow up in about 1 year (around 4/11/2024) for annual physical.    This note was created by combination of typed  and M-Modal dictation.  Transcription errors may be present.  If there are any questions, please contact me.

## 2023-04-13 DIAGNOSIS — J30.1 SEASONAL ALLERGIC RHINITIS DUE TO POLLEN: ICD-10-CM

## 2023-04-13 RX ORDER — FLUTICASONE PROPIONATE 50 MCG
SPRAY, SUSPENSION (ML) NASAL
Qty: 48 G | OUTPATIENT
Start: 2023-04-13

## 2023-04-13 NOTE — TELEPHONE ENCOUNTER
Refill Decision Note   Joanie Chandler  is requesting a refill authorization.  Brief Assessment and Rationale for Refill:  Quick Discontinue     Medication Therapy Plan:         Comments:     Note composed:10:45 AM 04/13/2023             Appointments     Last Visit   4/11/2023 Leni Castanon MD   Next Visit   Visit date not found Leni Castanon MD

## 2023-04-13 NOTE — TELEPHONE ENCOUNTER
No new care gaps identified.  Health system Embedded Care Gaps. Reference number: [id number]. 4/13/2023   7:21:02 AM CDT

## 2023-04-26 ENCOUNTER — TELEPHONE (OUTPATIENT)
Dept: ENDOCRINOLOGY | Facility: CLINIC | Age: 63
End: 2023-04-26
Payer: COMMERCIAL

## 2023-04-26 RX ORDER — DULAGLUTIDE 4.5 MG/.5ML
4.5 INJECTION, SOLUTION SUBCUTANEOUS
Qty: 4 PEN | Refills: 3 | Status: SHIPPED | OUTPATIENT
Start: 2023-04-26 | End: 2023-09-12 | Stop reason: SDUPTHER

## 2023-04-27 ENCOUNTER — TELEPHONE (OUTPATIENT)
Dept: PHARMACY | Facility: CLINIC | Age: 63
End: 2023-04-27
Payer: COMMERCIAL

## 2023-07-31 ENCOUNTER — PATIENT OUTREACH (OUTPATIENT)
Dept: ADMINISTRATIVE | Facility: HOSPITAL | Age: 63
End: 2023-07-31
Payer: COMMERCIAL

## 2023-07-31 DIAGNOSIS — Z12.31 ENCOUNTER FOR SCREENING MAMMOGRAM FOR MALIGNANT NEOPLASM OF BREAST: Primary | ICD-10-CM

## 2023-07-31 NOTE — PROGRESS NOTES
Additional Care Coordinator Notes:     HM and immunization's reviewed and updated.    Further Action Needed If Patient Returns Outreach:    Population Health Chart Review & Patient Outreach Details:     Reason for Outreach Encounter:     [x]  Non-Compliant Report   []  Payor Report ([employer], PHN, BCBS, MSSP, MCIP, UHC, etc.)   []  Pre-Visit Chart Review     Updates Requested / Reviewed:     [x]  Care Everywhere    []     []  External Sources ([facility], [facility], DIS, etc.)   []  Care Team Updated    Patient Outreach Method:    [x]  Telephone Outreach Completed   [x] Successful   [] Left Voicemail   [] Unable to Contact (wrong number, no voicemail)  []  [facility] Outreach Sent  []  Letter Outreach Mailed  []  Fax Sent for External Records  []  External Records Upload    Health Maintenance Topics Addressed and Outreach Outcomes / Actions Taken:        [x]      Breast Cancer Screening [x]  Mammo Scheduled      []  External Records Requested     []  Added Reminder to Complete to Upcoming Primary Care Appt Notes     []  Patient Declined     []  Patient Will Call Back to Schedule     []  Patient Will Schedule with External Provider / Order Routed if Applicable             []       Cervical Cancer Screening []  Pap Scheduled      []  External Records Requested     []  Added Reminder to Complete to Upcoming Primary Care Appt Notes     []  Patient Declined     []  Patient Will Call Back to Schedule     []  Patient Will Schedule with External Provider               [x]          Colorectal Cancer Screening []  Colonoscopy Case Request or Referral Placed     []  External Records Requested     []  Added Reminder to Complete to Upcoming Primary Care Appt Notes     []  Patient Declined     []  Patient Will Call Back to Schedule     []  Patient Will Schedule with External Provider     []  Fit Kit Mailed (add the SmartPhrase under additional notes)     [x] PAT Scheduled      [] Cologuard orders placed.      []   Reminded Patient to Complete Home Test             [x]      Diabetic Eye Exam []  Eye Camera Scheduled or Optometry Referral Placed     []  External Records Requested     []  Added Reminder to Complete to Upcoming Primary Care Appt Notes     []  Patient Declined     [x]  Patient Will Call Back to Schedule     []  Patient Will Schedule with External Provider             []      Blood Pressure Control []  Primary Care Follow Up Visit Scheduled     []  Remote Blood Pressure Reading Captured     []  Added Reminder to Complete to Upcoming Primary Care Appt Notes     []  Patient Declined     []  Patient Will Call Back / Patient Will Send Portal Message with Reading     []  Patient Will Call Back to Schedule Provider Visit             [x]       HbA1c & Other Labs [x]  Lab Appt Scheduled for Due Labs     []  Primary Care Follow Up Visit Scheduled      []  Reminded Patient to Complete Home Test     []  Added Reminder to Complete to Upcoming Primary Care Appt Notes     []  Patient Declined     []  Patient Will Call Back to Schedule     []  Patient Will Schedule with External Provider / Order Routed if Applicable           []    Schedule Primary Care Appt []  Primary Care Appt Scheduled     []  Patient Declined     []  Patient Will Call Back to Schedule     []  Pt Established with External Provider & Updated Care Team             []      Medication Adherence []  Primary Care Appointment Scheduled     []  Added Reminder to Upcoming Primary Care Appt Notes     []  Patient Reminded to  Prescription     []  Patient Declined, Provider Notified if Needed     []  Sent Provider Message to Review and/or Add Exclusion to Problem List             []      Osteoporosis Screening []  DXA Appointment Scheduled     []  External Records Requested     []  Added Reminder to Complete to Upcoming Primary Care Appt Notes     []  Patient Declined     []  Patient Will Call Back to Schedule     []  Patient Will Schedule with External Provider /  Order Routed if Applicable

## 2023-08-03 ENCOUNTER — OFFICE VISIT (OUTPATIENT)
Dept: FAMILY MEDICINE | Facility: CLINIC | Age: 63
End: 2023-08-03
Payer: COMMERCIAL

## 2023-08-03 VITALS
WEIGHT: 169.75 LBS | OXYGEN SATURATION: 99 % | HEART RATE: 77 BPM | TEMPERATURE: 98 F | HEIGHT: 59 IN | BODY MASS INDEX: 34.22 KG/M2 | DIASTOLIC BLOOD PRESSURE: 84 MMHG | SYSTOLIC BLOOD PRESSURE: 140 MMHG | RESPIRATION RATE: 18 BRPM

## 2023-08-03 DIAGNOSIS — I10 ESSENTIAL HYPERTENSION: ICD-10-CM

## 2023-08-03 DIAGNOSIS — M25.511 ACUTE PAIN OF RIGHT SHOULDER: Primary | ICD-10-CM

## 2023-08-03 DIAGNOSIS — M25.512 ACUTE PAIN OF LEFT SHOULDER: ICD-10-CM

## 2023-08-03 PROCEDURE — 3079F PR MOST RECENT DIASTOLIC BLOOD PRESSURE 80-89 MM HG: ICD-10-PCS | Mod: CPTII,S$GLB,, | Performed by: STUDENT IN AN ORGANIZED HEALTH CARE EDUCATION/TRAINING PROGRAM

## 2023-08-03 PROCEDURE — 1159F MED LIST DOCD IN RCRD: CPT | Mod: CPTII,S$GLB,, | Performed by: STUDENT IN AN ORGANIZED HEALTH CARE EDUCATION/TRAINING PROGRAM

## 2023-08-03 PROCEDURE — 99999 PR PBB SHADOW E&M-EST. PATIENT-LVL V: ICD-10-PCS | Mod: PBBFAC,,, | Performed by: STUDENT IN AN ORGANIZED HEALTH CARE EDUCATION/TRAINING PROGRAM

## 2023-08-03 PROCEDURE — 1159F PR MEDICATION LIST DOCUMENTED IN MEDICAL RECORD: ICD-10-PCS | Mod: CPTII,S$GLB,, | Performed by: STUDENT IN AN ORGANIZED HEALTH CARE EDUCATION/TRAINING PROGRAM

## 2023-08-03 PROCEDURE — 3077F PR MOST RECENT SYSTOLIC BLOOD PRESSURE >= 140 MM HG: ICD-10-PCS | Mod: CPTII,S$GLB,, | Performed by: STUDENT IN AN ORGANIZED HEALTH CARE EDUCATION/TRAINING PROGRAM

## 2023-08-03 PROCEDURE — 3008F PR BODY MASS INDEX (BMI) DOCUMENTED: ICD-10-PCS | Mod: CPTII,S$GLB,, | Performed by: STUDENT IN AN ORGANIZED HEALTH CARE EDUCATION/TRAINING PROGRAM

## 2023-08-03 PROCEDURE — 3008F BODY MASS INDEX DOCD: CPT | Mod: CPTII,S$GLB,, | Performed by: STUDENT IN AN ORGANIZED HEALTH CARE EDUCATION/TRAINING PROGRAM

## 2023-08-03 PROCEDURE — 99214 OFFICE O/P EST MOD 30 MIN: CPT | Mod: S$GLB,,, | Performed by: STUDENT IN AN ORGANIZED HEALTH CARE EDUCATION/TRAINING PROGRAM

## 2023-08-03 PROCEDURE — 3044F HG A1C LEVEL LT 7.0%: CPT | Mod: CPTII,S$GLB,, | Performed by: STUDENT IN AN ORGANIZED HEALTH CARE EDUCATION/TRAINING PROGRAM

## 2023-08-03 PROCEDURE — 99214 PR OFFICE/OUTPT VISIT, EST, LEVL IV, 30-39 MIN: ICD-10-PCS | Mod: S$GLB,,, | Performed by: STUDENT IN AN ORGANIZED HEALTH CARE EDUCATION/TRAINING PROGRAM

## 2023-08-03 PROCEDURE — 99999 PR PBB SHADOW E&M-EST. PATIENT-LVL V: CPT | Mod: PBBFAC,,, | Performed by: STUDENT IN AN ORGANIZED HEALTH CARE EDUCATION/TRAINING PROGRAM

## 2023-08-03 PROCEDURE — 3077F SYST BP >= 140 MM HG: CPT | Mod: CPTII,S$GLB,, | Performed by: STUDENT IN AN ORGANIZED HEALTH CARE EDUCATION/TRAINING PROGRAM

## 2023-08-03 PROCEDURE — 4010F ACE/ARB THERAPY RXD/TAKEN: CPT | Mod: CPTII,S$GLB,, | Performed by: STUDENT IN AN ORGANIZED HEALTH CARE EDUCATION/TRAINING PROGRAM

## 2023-08-03 PROCEDURE — 3079F DIAST BP 80-89 MM HG: CPT | Mod: CPTII,S$GLB,, | Performed by: STUDENT IN AN ORGANIZED HEALTH CARE EDUCATION/TRAINING PROGRAM

## 2023-08-03 PROCEDURE — 4010F PR ACE/ARB THEARPY RXD/TAKEN: ICD-10-PCS | Mod: CPTII,S$GLB,, | Performed by: STUDENT IN AN ORGANIZED HEALTH CARE EDUCATION/TRAINING PROGRAM

## 2023-08-03 PROCEDURE — 3044F PR MOST RECENT HEMOGLOBIN A1C LEVEL <7.0%: ICD-10-PCS | Mod: CPTII,S$GLB,, | Performed by: STUDENT IN AN ORGANIZED HEALTH CARE EDUCATION/TRAINING PROGRAM

## 2023-08-03 RX ORDER — MELOXICAM 15 MG/1
15 TABLET ORAL DAILY
Qty: 30 TABLET | Refills: 0 | Status: SHIPPED | OUTPATIENT
Start: 2023-08-03 | End: 2023-09-19 | Stop reason: SDUPTHER

## 2023-08-03 RX ORDER — ROSUVASTATIN CALCIUM 20 MG/1
TABLET, COATED ORAL
COMMUNITY
Start: 2023-06-28 | End: 2024-01-03

## 2023-08-03 NOTE — PROGRESS NOTES
Health Maintenance Due   Topic     TETANUS VACCINE  Pt decline    Low Dose Statin  Consult pcp    Colorectal Cancer Screening  Virtual audio scheduled     Shingles Vaccine (1 of 2) Pt decline    COVID-19 Vaccine (4 - Pfizer series) Not offered at this Facility    Eye Exam  Consult pcp    Hemoglobin A1c  Consult pcp    Mammogram  Pt scheduled

## 2023-08-03 NOTE — PROGRESS NOTES
SUBJECTIVE     Chief Complaint   Patient presents with    Arm Pain     both    Shoulder Pain     both       HPI  Joanie Chandler is a 62 y.o. female with multiple medical diagnoses as listed in the medical history and problem list that presents for evaluation of shoulder and arm pain.    Pt is established to this clinic with my colleague Dr. Castanon, but this is my first time seeing Ms. Chandler.    Pt c/o BL shoulder pain, started about 2 weeks ago. Goes into upper arms but not forearms, no numbness nor tingling associated with this. Denies neck pain, headaches, recent illness. Denies any heavy lifting, new exercise, or any other inciting incident.      PAST MEDICAL HISTORY:  Past Medical History:   Diagnosis Date    Allergy     Arthritis     Blood clot in vein     Diabetes mellitus     Diabetes mellitus, type 2     Hyperlipidemia     Hypertension     Uncontrolled type 2 diabetes mellitus with hyperglycemia 2/14/2020       ALLERGIES AND MEDICATIONS: updated and reviewed.  Review of patient's allergies indicates:  No Known Allergies  Current Outpatient Medications   Medication Sig Dispense Refill    cetirizine (ZYRTEC) 10 MG tablet Take 10 mg by mouth once daily.      dulaglutide (TRULICITY) 4.5 mg/0.5 mL pen injector Inject 4.5 mg into the skin every 7 days. 4 pen 3    fluticasone propionate (FLONASE) 50 mcg/actuation nasal spray 1 spray (50 mcg total) by Each Nostril route once daily. 16 g 0    metFORMIN (GLUCOPHAGE-XR) 500 MG ER 24hr tablet Take 1 tablet (500 mg total) by mouth 2 (two) times daily with meals. 180 tablet 2    olopatadine (PATANASE) 0.6 % nasal spray 2 sprays by Each Nostril route 2 (two) times daily.      valsartan (DIOVAN) 80 MG tablet Take 1 tablet (80 mg total) by mouth once daily. 90 tablet 2    chlorhexidine (PERIDEX) 0.12 % solution 15 mLs 2 (two) times daily.      lancets 33 gauge Misc Use 2 times a day to check blood glucose (Patient not taking: Reported on 8/3/2023) 200 each 3    lancing  "device Misc One device, used to check blood glucose, ICD-10: E11.9 (Patient not taking: Reported on 8/3/2023) 1 each 0    meloxicam (MOBIC) 15 MG tablet Take 1 tablet (15 mg total) by mouth once daily. 30 tablet 0    rosuvastatin (CRESTOR) 20 MG tablet        No current facility-administered medications for this visit.       ROS  Review of Systems   Constitutional:  Negative for chills, fatigue and fever.   HENT:  Negative for rhinorrhea and sore throat.    Respiratory:  Negative for cough and shortness of breath.    Cardiovascular:  Negative for chest pain and palpitations.   Gastrointestinal:  Negative for constipation, diarrhea, nausea and vomiting.   Genitourinary:  Negative for dysuria.   Musculoskeletal:  Positive for arthralgias. Negative for joint swelling.   Skin:  Negative for rash and wound.   Neurological:  Negative for light-headedness and headaches.   Psychiatric/Behavioral:  Negative for dysphoric mood and sleep disturbance. The patient is not nervous/anxious.          OBJECTIVE     Physical Exam  Vitals:    08/03/23 0800   BP: (!) 140/84   Pulse: 77   Resp: 18   Temp: 98.2 °F (36.8 °C)    Body mass index is 34.29 kg/m².  Weight: 77 kg (169 lb 12.1 oz)   Height: 4' 11" (149.9 cm)     Physical Exam  Vitals reviewed.   Constitutional:       General: She is not in acute distress.  HENT:      Right Ear: External ear normal.      Left Ear: External ear normal.      Nose: Nose normal.      Mouth/Throat:      Mouth: Mucous membranes are moist.   Eyes:      Extraocular Movements: Extraocular movements intact.      Conjunctiva/sclera: Conjunctivae normal.      Pupils: Pupils are equal, round, and reactive to light.   Pulmonary:      Effort: Pulmonary effort is normal.   Abdominal:      General: There is no distension.      Palpations: Abdomen is soft.   Musculoskeletal:         General: No swelling. Normal range of motion.      Cervical back: Normal range of motion.      Comments: Full range of motion of L " shoulder anteriorly and laterally.   Negative empty can test left.    No pain with external rotation of left arm with arm at side and elbow flexed to 90 degrees.   No pain with modified lift-off test left side.  + pain with belly press left side.  No muscle wasting appreciated to left shoulder.   No pain with abduction of left arm above 90 degrees.     Full range of motion of R shoulder anteriorly and laterally.   + empty can test right.    + pain with external rotation of right arm with arm at side and elbow flexed to 90 degrees.   No pain with modified lift-off test right side.  + pain with belly press right side.  No muscle wasting appreciated to right shoulder.   No pain with abduction of right arm above 90 degrees.      Skin:     General: Skin is warm and dry.      Findings: No rash.   Neurological:      General: No focal deficit present.      Mental Status: She is alert and oriented to person, place, and time.   Psychiatric:         Mood and Affect: Mood normal.         Behavior: Behavior normal.           Health Maintenance         Date Due Completion Date    TETANUS VACCINE Never done ---    Colorectal Cancer Screening Never done ---    Shingles Vaccine (1 of 2) Never done ---    COVID-19 Vaccine (4 - Pfizer series) 01/27/2022 12/2/2021    Eye Exam 12/10/2022 12/10/2021    Hemoglobin A1c 08/17/2023 2/17/2023    Mammogram 10/07/2023 10/7/2022    Influenza Vaccine (1) 09/01/2023 9/29/2022    Override on 9/10/2019: Done    Diabetes Urine Screening 09/29/2023 9/29/2022    Foot Exam 10/06/2023 10/6/2022    Lipid Panel 02/17/2024 2/17/2023    Low Dose Statin 06/28/2024 6/28/2023              ASSESSMENT     62 y.o. female with     1. Acute pain of right shoulder    2. Acute pain of left shoulder    3. Essential hypertension        PLAN:     1. Acute pain of right shoulder  - New. Signs of rotator cuff dysfunction on exam. Will order xray, mobic, and PT. F/u 1 month.  - X-ray Shoulder 2 or More Views Right;  Future  - meloxicam (MOBIC) 15 MG tablet; Take 1 tablet (15 mg total) by mouth once daily.  Dispense: 30 tablet; Refill: 0  - Ambulatory referral/consult to Physical/Occupational Therapy; Future    2. Acute pain of left shoulder  - New. Will order xray, mobic, and PT. F/u 1 month.  - X-Ray Shoulder 2 or More Views Left; Future  - meloxicam (MOBIC) 15 MG tablet; Take 1 tablet (15 mg total) by mouth once daily.  Dispense: 30 tablet; Refill: 0  - Ambulatory referral/consult to Physical/Occupational Therapy; Future    3. Essential hypertension  - BP elevated today, however pt in pain. Patient was counseled and encouraged to maintain a low sodium diet, as well as increasing physical activity.  Recommend random BP checks at home on a regular basis. Will continue medication at this time, and follow up in 1 month.          Katarina Lakhani MD  08/03/2023 8:13 AM        Follow up in about 4 weeks (around 8/31/2023).

## 2023-08-14 ENCOUNTER — HOSPITAL ENCOUNTER (OUTPATIENT)
Dept: RADIOLOGY | Facility: HOSPITAL | Age: 63
Discharge: HOME | End: 2023-08-14
Attending: STUDENT IN AN ORGANIZED HEALTH CARE EDUCATION/TRAINING PROGRAM
Payer: COMMERCIAL

## 2023-08-14 DIAGNOSIS — M25.511 ACUTE PAIN OF RIGHT SHOULDER: ICD-10-CM

## 2023-08-14 DIAGNOSIS — M25.512 ACUTE PAIN OF LEFT SHOULDER: ICD-10-CM

## 2023-08-14 PROCEDURE — 72120: ICD-10-PCS | Mod: 26,LT,, | Performed by: RADIOLOGY

## 2023-08-14 PROCEDURE — 72120 X-RAY BEND ONLY L-S SPINE: CPT | Mod: 26,LT,, | Performed by: RADIOLOGY

## 2023-08-14 PROCEDURE — 72120 X-RAY BEND ONLY L-S SPINE: CPT | Mod: 26,RT,, | Performed by: RADIOLOGY

## 2023-08-14 PROCEDURE — 73030 X-RAY EXAM OF SHOULDER: CPT | Mod: TC,50,FY

## 2023-08-14 NOTE — PROGRESS NOTES
Update- Discussed with patient today via phone 1:20PM, pt will call to schedule CT scan. Text sent with CT scheduling number.    Katarina Lakhani MD 08/14/2023

## 2023-08-18 ENCOUNTER — LAB VISIT (OUTPATIENT)
Dept: LAB | Facility: HOSPITAL | Age: 63
End: 2023-08-18
Attending: PHYSICIAN ASSISTANT
Payer: COMMERCIAL

## 2023-08-18 DIAGNOSIS — E11.9 TYPE 2 DIABETES MELLITUS WITHOUT COMPLICATION, WITHOUT LONG-TERM CURRENT USE OF INSULIN: ICD-10-CM

## 2023-08-18 LAB
ESTIMATED AVG GLUCOSE: 166 MG/DL (ref 68–131)
HBA1C MFR BLD: 7.4 % (ref 4–5.6)

## 2023-08-18 PROCEDURE — 83036 HEMOGLOBIN GLYCOSYLATED A1C: CPT | Performed by: NURSE PRACTITIONER

## 2023-08-18 PROCEDURE — 36415 COLL VENOUS BLD VENIPUNCTURE: CPT | Mod: PO | Performed by: NURSE PRACTITIONER

## 2023-08-22 ENCOUNTER — HOSPITAL ENCOUNTER (OUTPATIENT)
Dept: RADIOLOGY | Facility: HOSPITAL | Age: 63
Discharge: HOME | End: 2023-08-22
Attending: STUDENT IN AN ORGANIZED HEALTH CARE EDUCATION/TRAINING PROGRAM
Payer: COMMERCIAL

## 2023-08-22 DIAGNOSIS — R91.1 SOLITARY PULMONARY NODULE: ICD-10-CM

## 2023-08-22 DIAGNOSIS — R91.8 LUNG MASS: ICD-10-CM

## 2023-08-22 PROCEDURE — 71260 CT THORAX DX C+: CPT | Mod: 26,,, | Performed by: INTERNAL MEDICINE

## 2023-08-22 PROCEDURE — 71260 CT CHEST WITH CONTRAST: ICD-10-PCS | Mod: 26,,, | Performed by: INTERNAL MEDICINE

## 2023-08-22 PROCEDURE — 25500020 PHARM REV CODE 255: Performed by: STUDENT IN AN ORGANIZED HEALTH CARE EDUCATION/TRAINING PROGRAM

## 2023-08-22 PROCEDURE — 71260 CT THORAX DX C+: CPT | Mod: TC

## 2023-08-22 RX ADMIN — IOHEXOL 75 ML: 350 INJECTION, SOLUTION INTRAVENOUS at 10:08

## 2023-08-23 ENCOUNTER — TELEPHONE (OUTPATIENT)
Dept: FAMILY MEDICINE | Facility: CLINIC | Age: 63
End: 2023-08-23
Payer: COMMERCIAL

## 2023-08-23 NOTE — TELEPHONE ENCOUNTER
"Pt advised test results and verbalized understanding.  39 PM CDT -----  "Please call Ms. Nair and let her know that her chest CT shows that the lesion they saw in her lungs is most likely benign (not cancerous). I would still like her to see a lung doctor to follow this, so I have sent a referral that she can call to schedule. Thanks!" Dr. Castanon     "

## 2023-08-23 NOTE — PROGRESS NOTES
Please call Ms. Nair and let her know that her chest CT shows that the lesion they saw in her lungs is most likely benign (not cancerous). I would still like her to see a lung doctor to follow this, so I have sent a referral that she can call to schedule. Thanks!

## 2023-08-23 NOTE — TELEPHONE ENCOUNTER
----- Message from Katarina Lakhani MD sent at 8/23/2023  2:39 PM CDT -----  Please call Ms. Nair and let her know that her chest CT shows that the lesion they saw in her lungs is most likely benign (not cancerous). I would still like her to see a lung doctor to follow this, so I have sent a referral that she can call to schedule. Thanks!

## 2023-09-12 DIAGNOSIS — E11.9 TYPE 2 DIABETES MELLITUS WITHOUT COMPLICATION, WITHOUT LONG-TERM CURRENT USE OF INSULIN: Primary | ICD-10-CM

## 2023-09-12 RX ORDER — DULAGLUTIDE 4.5 MG/.5ML
4.5 INJECTION, SOLUTION SUBCUTANEOUS
Qty: 4 PEN | Refills: 2 | Status: SHIPPED | OUTPATIENT
Start: 2023-09-12 | End: 2023-10-24 | Stop reason: SDUPTHER

## 2023-09-18 ENCOUNTER — OFFICE VISIT (OUTPATIENT)
Dept: FAMILY MEDICINE | Facility: CLINIC | Age: 63
End: 2023-09-18
Payer: COMMERCIAL

## 2023-09-18 DIAGNOSIS — M25.511 ACUTE PAIN OF RIGHT SHOULDER: Primary | ICD-10-CM

## 2023-09-18 DIAGNOSIS — R91.8 ABNORMAL CT LUNG SCREENING: ICD-10-CM

## 2023-09-18 DIAGNOSIS — E66.09 CLASS 1 OBESITY DUE TO EXCESS CALORIES WITH SERIOUS COMORBIDITY AND BODY MASS INDEX (BMI) OF 34.0 TO 34.9 IN ADULT: ICD-10-CM

## 2023-09-18 DIAGNOSIS — E11.65 TYPE 2 DIABETES MELLITUS WITH HYPERGLYCEMIA, WITHOUT LONG-TERM CURRENT USE OF INSULIN: ICD-10-CM

## 2023-09-18 DIAGNOSIS — I10 ESSENTIAL HYPERTENSION: ICD-10-CM

## 2023-09-18 PROCEDURE — 3008F BODY MASS INDEX DOCD: CPT | Mod: CPTII,S$GLB,, | Performed by: INTERNAL MEDICINE

## 2023-09-18 PROCEDURE — 3077F SYST BP >= 140 MM HG: CPT | Mod: CPTII,S$GLB,, | Performed by: INTERNAL MEDICINE

## 2023-09-18 PROCEDURE — 1159F PR MEDICATION LIST DOCUMENTED IN MEDICAL RECORD: ICD-10-PCS | Mod: CPTII,S$GLB,, | Performed by: INTERNAL MEDICINE

## 2023-09-18 PROCEDURE — 3008F PR BODY MASS INDEX (BMI) DOCUMENTED: ICD-10-PCS | Mod: CPTII,S$GLB,, | Performed by: INTERNAL MEDICINE

## 2023-09-18 PROCEDURE — 4010F PR ACE/ARB THEARPY RXD/TAKEN: ICD-10-PCS | Mod: CPTII,S$GLB,, | Performed by: INTERNAL MEDICINE

## 2023-09-18 PROCEDURE — 4010F ACE/ARB THERAPY RXD/TAKEN: CPT | Mod: CPTII,S$GLB,, | Performed by: INTERNAL MEDICINE

## 2023-09-18 PROCEDURE — 1160F PR REVIEW ALL MEDS BY PRESCRIBER/CLIN PHARMACIST DOCUMENTED: ICD-10-PCS | Mod: CPTII,S$GLB,, | Performed by: INTERNAL MEDICINE

## 2023-09-18 PROCEDURE — 99999 PR PBB SHADOW E&M-EST. PATIENT-LVL IV: ICD-10-PCS | Mod: PBBFAC,,, | Performed by: INTERNAL MEDICINE

## 2023-09-18 PROCEDURE — 1159F MED LIST DOCD IN RCRD: CPT | Mod: CPTII,S$GLB,, | Performed by: INTERNAL MEDICINE

## 2023-09-18 PROCEDURE — 3051F HG A1C>EQUAL 7.0%<8.0%: CPT | Mod: CPTII,S$GLB,, | Performed by: INTERNAL MEDICINE

## 2023-09-18 PROCEDURE — 3078F PR MOST RECENT DIASTOLIC BLOOD PRESSURE < 80 MM HG: ICD-10-PCS | Mod: CPTII,S$GLB,, | Performed by: INTERNAL MEDICINE

## 2023-09-18 PROCEDURE — 3078F DIAST BP <80 MM HG: CPT | Mod: CPTII,S$GLB,, | Performed by: INTERNAL MEDICINE

## 2023-09-18 PROCEDURE — 3051F PR MOST RECENT HEMOGLOBIN A1C LEVEL 7.0 - < 8.0%: ICD-10-PCS | Mod: CPTII,S$GLB,, | Performed by: INTERNAL MEDICINE

## 2023-09-18 PROCEDURE — 3077F PR MOST RECENT SYSTOLIC BLOOD PRESSURE >= 140 MM HG: ICD-10-PCS | Mod: CPTII,S$GLB,, | Performed by: INTERNAL MEDICINE

## 2023-09-18 PROCEDURE — 99214 OFFICE O/P EST MOD 30 MIN: CPT | Mod: S$GLB,,, | Performed by: INTERNAL MEDICINE

## 2023-09-18 PROCEDURE — 99214 PR OFFICE/OUTPT VISIT, EST, LEVL IV, 30-39 MIN: ICD-10-PCS | Mod: S$GLB,,, | Performed by: INTERNAL MEDICINE

## 2023-09-18 PROCEDURE — 99999 PR PBB SHADOW E&M-EST. PATIENT-LVL IV: CPT | Mod: PBBFAC,,, | Performed by: INTERNAL MEDICINE

## 2023-09-18 PROCEDURE — 1160F RVW MEDS BY RX/DR IN RCRD: CPT | Mod: CPTII,S$GLB,, | Performed by: INTERNAL MEDICINE

## 2023-09-19 ENCOUNTER — CLINICAL SUPPORT (OUTPATIENT)
Dept: ENDOSCOPY | Facility: HOSPITAL | Age: 63
End: 2023-09-19
Attending: INTERNAL MEDICINE
Payer: COMMERCIAL

## 2023-09-19 VITALS
DIASTOLIC BLOOD PRESSURE: 74 MMHG | TEMPERATURE: 98 F | HEART RATE: 85 BPM | WEIGHT: 170.88 LBS | OXYGEN SATURATION: 99 % | SYSTOLIC BLOOD PRESSURE: 182 MMHG | BODY MASS INDEX: 34.45 KG/M2 | HEIGHT: 59 IN | RESPIRATION RATE: 16 BRPM

## 2023-09-19 DIAGNOSIS — Z12.12 ENCOUNTER FOR COLORECTAL CANCER SCREENING: ICD-10-CM

## 2023-09-19 DIAGNOSIS — Z12.11 ENCOUNTER FOR COLORECTAL CANCER SCREENING: ICD-10-CM

## 2023-09-19 PROCEDURE — 96372 PR INJECTION,THERAP/PROPH/DIAG2ST, IM OR SUBCUT: ICD-10-PCS | Mod: S$GLB,,, | Performed by: INTERNAL MEDICINE

## 2023-09-19 PROCEDURE — 96372 THER/PROPH/DIAG INJ SC/IM: CPT | Mod: S$GLB,,, | Performed by: INTERNAL MEDICINE

## 2023-09-19 RX ORDER — DICLOFENAC SODIUM 10 MG/G
2 GEL TOPICAL 2 TIMES DAILY PRN
Qty: 100 EACH | Refills: 1 | Status: SHIPPED | OUTPATIENT
Start: 2023-09-19

## 2023-09-19 RX ORDER — MELOXICAM 15 MG/1
15 TABLET ORAL DAILY
Qty: 30 TABLET | Refills: 0 | Status: SHIPPED | OUTPATIENT
Start: 2023-09-19 | End: 2023-09-19

## 2023-09-19 RX ORDER — KETOROLAC TROMETHAMINE 30 MG/ML
30 INJECTION, SOLUTION INTRAMUSCULAR; INTRAVENOUS
Status: COMPLETED | OUTPATIENT
Start: 2023-09-19 | End: 2023-09-19

## 2023-09-19 RX ORDER — MELOXICAM 15 MG/1
15 TABLET ORAL DAILY
Qty: 30 TABLET | Refills: 0 | Status: SHIPPED | OUTPATIENT
Start: 2023-09-19 | End: 2023-10-19

## 2023-09-19 RX ADMIN — KETOROLAC TROMETHAMINE 30 MG: 30 INJECTION, SOLUTION INTRAMUSCULAR; INTRAVENOUS at 07:09

## 2023-09-19 NOTE — PROGRESS NOTES
Health Maintenance Due   Topic     TETANUS VACCINE      Colorectal Cancer Screening      Shingles Vaccine (1 of 2) hx chickenpox ; inform pt can get vaccine at pharmacy.    COVID-19 Vaccine (4 - Pfizer series)     Eye Exam      Influenza Vaccine (1)     Diabetes Urine Screening      Foot Exam      Mammogram

## 2023-09-20 ENCOUNTER — TELEPHONE (OUTPATIENT)
Dept: ORTHOPEDICS | Facility: CLINIC | Age: 63
End: 2023-09-20
Payer: COMMERCIAL

## 2023-09-20 NOTE — TELEPHONE ENCOUNTER
----- Message from Francisca Wylie sent at 9/20/2023  2:37 PM CDT -----  Type:  Sooner Apoointment Request    Caller is requesting a sooner appointment.  Caller declined first available appointment listed below.  Caller will not accept being placed on the waitlist and is requesting a message be sent to doctor.  Name of Caller: Pt  When is the first available appointment?  Symptoms:  Would the patient rather a call back or a response via [facility]? Call  Best Call Back Number: [phone]  Additional Information: Pt would like to be seen at 1:00pm on 9/25/23 instead of 11:15a.m.

## 2023-09-29 ENCOUNTER — TELEPHONE (OUTPATIENT)
Dept: ADMINISTRATIVE | Facility: HOSPITAL | Age: 63
End: 2023-09-29
Payer: COMMERCIAL

## 2023-09-29 ENCOUNTER — PATIENT OUTREACH (OUTPATIENT)
Dept: ADMINISTRATIVE | Facility: HOSPITAL | Age: 63
End: 2023-09-29
Payer: COMMERCIAL

## 2023-09-29 VITALS — DIASTOLIC BLOOD PRESSURE: 71 MMHG | SYSTOLIC BLOOD PRESSURE: 135 MMHG

## 2023-09-29 DIAGNOSIS — R91.8 LUNG MASS: ICD-10-CM

## 2023-09-29 DIAGNOSIS — R91.8 ABNORMAL CT LUNG SCREENING: Primary | ICD-10-CM

## 2023-09-29 DIAGNOSIS — R91.1 SOLITARY PULMONARY NODULE: Primary | ICD-10-CM

## 2023-09-29 NOTE — TELEPHONE ENCOUNTER
Referral placed to see pulmonologist due to mass in lung. Please contact patient to help with scheduling an appointment.  Referral unable to find any slot open. Please help.

## 2023-09-29 NOTE — TELEPHONE ENCOUNTER
Patient was seen by you 8/3/2023 - referral placed 2 times to follow up with pulmonoligst byt both referral was placed in wrong under [facility]. Can you put in another referral, I will get Dory to schedule after done. Thanks!

## 2023-09-29 NOTE — PROGRESS NOTES
Population Health Chart Review & Patient Outreach Details:     Reason for Outreach Encounter:     [x]  Non-Compliant Report   []  Payor Report (Hum[employer], PHN, BCBS, MSSP, MCIP, UHC, etc.)   []  Pre-Visit Chart Review     Updates Requested / Reviewed:     [x]  Care Everywhere    []     []  External Sources ([facility], [facility], DIS, etc.)   []  Care Team Updated    Patient Outreach Method:    [x]  Telephone Outreach Completed   [x] Successful   [] Left Voicemail   [] Unable to Contact (wrong number, no voicemail)  []  [employer] Portal Outreach Sent  []  Letter Outreach Mailed  []  Fax Sent for External Records  []  External Records Upload    Health Maintenance Topics Addressed and Outreach Outcomes / Actions Taken:        []      Breast Cancer Screening []  Mammo Scheduled      []  External Records Requested     []  Added Reminder to Complete to Upcoming Primary Care Appt Notes     []  Patient Declined     []  Patient Will Call Back to Schedule     []  Patient Will Schedule with External Provider / Order Routed if Applicable             []       Cervical Cancer Screening []  Pap Scheduled      []  External Records Requested     []  Added Reminder to Complete to Upcoming Primary Care Appt Notes     []  Patient Declined     []  Patient Will Call Back to Schedule     []  Patient Will Schedule with External Provider               [x]          Colorectal Cancer Screening []  Colonoscopy Case Request or Referral Placed     []  External Records Requested     []  Added Reminder to Complete to Upcoming Primary Care Appt Notes     []  Patient Declined     [x]  Patient Will Call Back to Schedule     []  Patient Will Schedule with External Provider     []  Fit Kit Mailed (add the SmartPhrase under additional notes)     []  Reminded Patient to Complete Home Test             [x]      Diabetic Eye Exam []  Eye Camera Scheduled or Optometry Referral Placed     []  External Records Requested     []  Added Reminder to Complete  to Upcoming Primary Care Appt Notes     []  Patient Declined     [x]  Patient Will Call Back to Schedule     []  Patient Will Schedule with External Provider             [x]      Blood Pressure Control []  Primary Care Follow Up Visit Scheduled     [x]  Remote Blood Pressure Reading Captured     []  Added Reminder to Complete to Upcoming Primary Care Appt Notes     []  Patient Declined     []  Patient Will Call Back / Patient Will Send Portal Message with Reading     []  Patient Will Call Back to Schedule Provider Visit             [x]       HbA1c & Other Labs [x]  Lab Appt Scheduled for Due Labs     []  Primary Care Follow Up Visit Scheduled      []  Reminded Patient to Complete Home Test     []  Added Reminder to Complete to Upcoming Primary Care Appt Notes     []  Patient Declined     []  Patient Will Call Back to Schedule     []  Patient Will Schedule with External Provider / Order Routed if Applicable           []    Schedule Primary Care Appt []  Primary Care Appt Scheduled     []  Patient Declined     []  Patient Will Call Back to Schedule     []  Pt Established with External Provider & Updated Care Team             []      Medication Adherence []  Primary Care Appointment Scheduled     []  Added Reminder to Upcoming Primary Care Appt Notes     []  Patient Reminded to  Prescription     []  Patient Declined, Provider Notified if Needed     []  Sent Provider Message to Review and/or Add Exclusion to Problem List             []      Osteoporosis Screening []  DXA Appointment Scheduled     []  External Records Requested     []  Added Reminder to Complete to Upcoming Primary Care Appt Notes     []  Patient Declined     []  Patient Will Call Back to Schedule     []  Patient Will Schedule with External Provider / Order Routed if Applicable     Additional Care Coordinator Notes:     Remote /71    Further Action Needed If Patient Returns Outreach:     Need remote home blood pressure reading.   Due for  overdue HM below, need to get recent records if already done. If not done, orders/referrals need to be place and schedule. Please forward messages to me.   Due for below need to be advised.  Health Maintenance Due   Topic     Colorectal Cancer Screening  Patient will call back to reschedule    Eye Exam      Diabetes Urine Screening  Schedule same day visit with brigitte

## 2023-09-29 NOTE — PROGRESS NOTES
Population Health Chart Review & Patient Outreach Details:     Reason for Outreach Encounter:     []  Non-Compliant Report   []  Payor Report (Hum[employer], PHN, BCBS, MSSP, MCIP, UHC, etc.)   []  Pre-Visit Chart Review     Updates Requested / Reviewed:     [x]  Care Everywhere    []     []  External Sources ([facility], [facility], DIS, etc.)   []  Care Team Updated    Patient Outreach Method:    [x]  Telephone Outreach Completed   [x] Successful   [] Left Voicemail   [] Unable to Contact (wrong number, no voicemail)  []  [employer] Portal Outreach Sent  []  Letter Outreach Mailed  []  Fax Sent for External Records  []  External Records Upload    Health Maintenance Topics Addressed and Outreach Outcomes / Actions Taken:        []      Breast Cancer Screening []  Mammo Scheduled      []  External Records Requested     []  Added Reminder to Complete to Upcoming Primary Care Appt Notes     []  Patient Declined     []  Patient Will Call Back to Schedule     []  Patient Will Schedule with External Provider / Order Routed if Applicable             []       Cervical Cancer Screening []  Pap Scheduled      []  External Records Requested     []  Added Reminder to Complete to Upcoming Primary Care Appt Notes     []  Patient Declined     []  Patient Will Call Back to Schedule     []  Patient Will Schedule with External Provider               []          Colorectal Cancer Screening []  Colonoscopy Case Request or Referral Placed     []  External Records Requested     []  Added Reminder to Complete to Upcoming Primary Care Appt Notes     []  Patient Declined     []  Patient Will Call Back to Schedule     []  Patient Will Schedule with External Provider     []  Fit Kit Mailed (add the SmartPhrase under additional notes)     []  Reminded Patient to Complete Home Test             []      Diabetic Eye Exam []  Eye Camera Scheduled or Optometry Referral Placed     []  External Records Requested     []  Added Reminder to Complete to  Upcoming Primary Care Appt Notes     []  Patient Declined     []  Patient Will Call Back to Schedule     []  Patient Will Schedule with External Provider             []      Blood Pressure Control []  Primary Care Follow Up Visit Scheduled     []  Remote Blood Pressure Reading Captured     []  Added Reminder to Complete to Upcoming Primary Care Appt Notes     []  Patient Declined     []  Patient Will Call Back / Patient Will Send Portal Message with Reading     []  Patient Will Call Back to Schedule Provider Visit             []       HbA1c & Other Labs []  Lab Appt Scheduled for Due Labs     []  Primary Care Follow Up Visit Scheduled      []  Reminded Patient to Complete Home Test     []  Added Reminder to Complete to Upcoming Primary Care Appt Notes     []  Patient Declined     []  Patient Will Call Back to Schedule     []  Patient Will Schedule with External Provider / Order Routed if Applicable           []    Schedule Primary Care Appt []  Primary Care Appt Scheduled     []  Patient Declined     []  Patient Will Call Back to Schedule     []  Pt Established with External Provider & Updated Care Team             []      Medication Adherence []  Primary Care Appointment Scheduled     []  Added Reminder to Upcoming Primary Care Appt Notes     []  Patient Reminded to  Prescription     []  Patient Declined, Provider Notified if Needed     []  Sent Provider Message to Review and/or Add Exclusion to Problem List             []      Osteoporosis Screening []  DXA Appointment Scheduled     []  External Records Requested     []  Added Reminder to Complete to Upcoming Primary Care Appt Notes     []  Patient Declined     []  Patient Will Call Back to Schedule     []  Patient Will Schedule with External Provider / Order Routed if Applicable     Additional Care Coordinator Notes:     Referral was placed for patient to see pulmonologist. Referral department stated sent message to staff to help schedule for other  patients. No opening. Attempt to give patient a number to call but stated to text number. Notified unable to text. Sent staff message for them to reach out the patient.      Further Action Needed If Patient Returns Outreach:

## 2023-10-16 ENCOUNTER — HOSPITAL ENCOUNTER (OUTPATIENT)
Dept: RADIOLOGY | Facility: HOSPITAL | Age: 63
Discharge: HOME | End: 2023-10-16
Attending: INTERNAL MEDICINE
Payer: COMMERCIAL

## 2023-10-16 DIAGNOSIS — Z12.31 ENCOUNTER FOR SCREENING MAMMOGRAM FOR MALIGNANT NEOPLASM OF BREAST: ICD-10-CM

## 2023-10-16 PROCEDURE — 77067 SCR MAMMO BI INCL CAD: CPT | Mod: 26,,, | Performed by: RADIOLOGY

## 2023-10-16 PROCEDURE — 77063 BREAST TOMOSYNTHESIS BI: CPT | Mod: 26,,, | Performed by: RADIOLOGY

## 2023-10-16 PROCEDURE — 77067 MAMMO DIGITAL SCREENING BILAT WITH TOMO: ICD-10-PCS | Mod: 26,,, | Performed by: RADIOLOGY

## 2023-10-16 PROCEDURE — 77063 MAMMO DIGITAL SCREENING BILAT WITH TOMO: ICD-10-PCS | Mod: 26,,, | Performed by: RADIOLOGY

## 2023-10-16 PROCEDURE — 77067 SCR MAMMO BI INCL CAD: CPT | Mod: TC

## 2023-10-17 ENCOUNTER — OFFICE VISIT (OUTPATIENT)
Dept: FAMILY MEDICINE | Facility: CLINIC | Age: 63
End: 2023-10-17
Payer: COMMERCIAL

## 2023-10-17 VITALS
WEIGHT: 168.88 LBS | TEMPERATURE: 99 F | DIASTOLIC BLOOD PRESSURE: 74 MMHG | SYSTOLIC BLOOD PRESSURE: 132 MMHG | BODY MASS INDEX: 34.04 KG/M2 | RESPIRATION RATE: 18 BRPM | HEIGHT: 59 IN | HEART RATE: 88 BPM | OXYGEN SATURATION: 97 %

## 2023-10-17 DIAGNOSIS — Z12.12 ENCOUNTER FOR COLORECTAL CANCER SCREENING: ICD-10-CM

## 2023-10-17 DIAGNOSIS — E11.65 TYPE 2 DIABETES MELLITUS WITH HYPERGLYCEMIA, WITHOUT LONG-TERM CURRENT USE OF INSULIN: ICD-10-CM

## 2023-10-17 DIAGNOSIS — R91.8 ABNORMAL CT LUNG SCREENING: Primary | ICD-10-CM

## 2023-10-17 DIAGNOSIS — Z12.11 ENCOUNTER FOR COLORECTAL CANCER SCREENING: ICD-10-CM

## 2023-10-17 LAB
ALBUMIN/CREAT UR: 3.2 UG/MG (ref 0–30)
CREAT UR-MCNC: 154 MG/DL (ref 15–325)
MICROALBUMIN UR DL<=1MG/L-MCNC: 5 UG/ML

## 2023-10-17 PROCEDURE — 1160F PR REVIEW ALL MEDS BY PRESCRIBER/CLIN PHARMACIST DOCUMENTED: ICD-10-PCS | Mod: CPTII,S$GLB,, | Performed by: PHYSICIAN ASSISTANT

## 2023-10-17 PROCEDURE — 4010F ACE/ARB THERAPY RXD/TAKEN: CPT | Mod: CPTII,S$GLB,, | Performed by: PHYSICIAN ASSISTANT

## 2023-10-17 PROCEDURE — 3008F BODY MASS INDEX DOCD: CPT | Mod: CPTII,S$GLB,, | Performed by: PHYSICIAN ASSISTANT

## 2023-10-17 PROCEDURE — 1160F RVW MEDS BY RX/DR IN RCRD: CPT | Mod: CPTII,S$GLB,, | Performed by: PHYSICIAN ASSISTANT

## 2023-10-17 PROCEDURE — 3061F PR NEG MICROALBUMINURIA RESULT DOCUMENTED/REVIEW: ICD-10-PCS | Mod: CPTII,S$GLB,, | Performed by: PHYSICIAN ASSISTANT

## 2023-10-17 PROCEDURE — 99213 PR OFFICE/OUTPT VISIT, EST, LEVL III, 20-29 MIN: ICD-10-PCS | Mod: S$GLB,,, | Performed by: PHYSICIAN ASSISTANT

## 2023-10-17 PROCEDURE — 99999 PR PBB SHADOW E&M-EST. PATIENT-LVL V: ICD-10-PCS | Mod: PBBFAC,,, | Performed by: PHYSICIAN ASSISTANT

## 2023-10-17 PROCEDURE — 3078F DIAST BP <80 MM HG: CPT | Mod: CPTII,S$GLB,, | Performed by: PHYSICIAN ASSISTANT

## 2023-10-17 PROCEDURE — 4010F PR ACE/ARB THEARPY RXD/TAKEN: ICD-10-PCS | Mod: CPTII,S$GLB,, | Performed by: PHYSICIAN ASSISTANT

## 2023-10-17 PROCEDURE — 3061F NEG MICROALBUMINURIA REV: CPT | Mod: CPTII,S$GLB,, | Performed by: PHYSICIAN ASSISTANT

## 2023-10-17 PROCEDURE — 82043 UR ALBUMIN QUANTITATIVE: CPT | Performed by: PHYSICIAN ASSISTANT

## 2023-10-17 PROCEDURE — 99999 PR PBB SHADOW E&M-EST. PATIENT-LVL V: CPT | Mod: PBBFAC,,, | Performed by: PHYSICIAN ASSISTANT

## 2023-10-17 PROCEDURE — 3051F PR MOST RECENT HEMOGLOBIN A1C LEVEL 7.0 - < 8.0%: ICD-10-PCS | Mod: CPTII,S$GLB,, | Performed by: PHYSICIAN ASSISTANT

## 2023-10-17 PROCEDURE — 3066F NEPHROPATHY DOC TX: CPT | Mod: CPTII,S$GLB,, | Performed by: PHYSICIAN ASSISTANT

## 2023-10-17 PROCEDURE — 99213 OFFICE O/P EST LOW 20 MIN: CPT | Mod: S$GLB,,, | Performed by: PHYSICIAN ASSISTANT

## 2023-10-17 PROCEDURE — 3075F SYST BP GE 130 - 139MM HG: CPT | Mod: CPTII,S$GLB,, | Performed by: PHYSICIAN ASSISTANT

## 2023-10-17 PROCEDURE — 3075F PR MOST RECENT SYSTOLIC BLOOD PRESS GE 130-139MM HG: ICD-10-PCS | Mod: CPTII,S$GLB,, | Performed by: PHYSICIAN ASSISTANT

## 2023-10-17 PROCEDURE — 3051F HG A1C>EQUAL 7.0%<8.0%: CPT | Mod: CPTII,S$GLB,, | Performed by: PHYSICIAN ASSISTANT

## 2023-10-17 PROCEDURE — 3008F PR BODY MASS INDEX (BMI) DOCUMENTED: ICD-10-PCS | Mod: CPTII,S$GLB,, | Performed by: PHYSICIAN ASSISTANT

## 2023-10-17 PROCEDURE — 3078F PR MOST RECENT DIASTOLIC BLOOD PRESSURE < 80 MM HG: ICD-10-PCS | Mod: CPTII,S$GLB,, | Performed by: PHYSICIAN ASSISTANT

## 2023-10-17 PROCEDURE — 1159F MED LIST DOCD IN RCRD: CPT | Mod: CPTII,S$GLB,, | Performed by: PHYSICIAN ASSISTANT

## 2023-10-17 PROCEDURE — 1159F PR MEDICATION LIST DOCUMENTED IN MEDICAL RECORD: ICD-10-PCS | Mod: CPTII,S$GLB,, | Performed by: PHYSICIAN ASSISTANT

## 2023-10-17 PROCEDURE — 3066F PR DOCUMENTATION OF TREATMENT FOR NEPHROPATHY: ICD-10-PCS | Mod: CPTII,S$GLB,, | Performed by: PHYSICIAN ASSISTANT

## 2023-10-17 NOTE — PROGRESS NOTES
Health Maintenance Due   Topic     TETANUS VACCINE      Colorectal Cancer Screening  Information received.      Shingles Vaccine (1 of 2)     Eye Exam  Consult pcp    Influenza Vaccine (1)     COVID-19 Vaccine (4 - 2023-24 season) Not offered at this Facility    Foot Exam  Consult pcp    Diabetes Urine Screening  Consult pcp

## 2023-10-17 NOTE — PROGRESS NOTES
Subjective     Patient ID: Joanie Chandler is a 63 y.o. female.    Chief Complaint: Follow-up    HPI: 64 yo female presents for follow up. States she is scheduled to see pul Friday for abnormal findings on CT. Denies cough and SOB.    Review of Systems   Respiratory:  Negative for cough and shortness of breath.           Objective     Physical Exam  Constitutional:       Appearance: Normal appearance.   HENT:      Head: Normocephalic and atraumatic.   Cardiovascular:      Rate and Rhythm: Normal rate and regular rhythm.   Pulmonary:      Effort: Pulmonary effort is normal.      Breath sounds: Normal breath sounds.   Neurological:      Mental Status: She is alert.            Assessment and Plan     1. Abnormal CT lung screening  Stable from prior imaging  Will see pul Friday    2. Type 2 diabetes mellitus with hyperglycemia, without long-term current use of insulin  -     Microalbumin/Creatinine Ratio, Urine    3. Encounter for colorectal cancer screening  -     refuses colonoscopy and stool test at this time                 No follow-ups on file.

## 2023-10-20 ENCOUNTER — OFFICE VISIT (OUTPATIENT)
Dept: PULMONOLOGY | Facility: CLINIC | Age: 63
End: 2023-10-20
Payer: COMMERCIAL

## 2023-10-20 VITALS
HEIGHT: 59 IN | DIASTOLIC BLOOD PRESSURE: 70 MMHG | BODY MASS INDEX: 34.18 KG/M2 | SYSTOLIC BLOOD PRESSURE: 130 MMHG | WEIGHT: 169.56 LBS

## 2023-10-20 DIAGNOSIS — R91.8 ABNORMAL CT LUNG SCREENING: ICD-10-CM

## 2023-10-20 DIAGNOSIS — R91.1 SOLITARY PULMONARY NODULE: Primary | ICD-10-CM

## 2023-10-20 DIAGNOSIS — J30.1 SEASONAL ALLERGIC RHINITIS DUE TO POLLEN: ICD-10-CM

## 2023-10-20 PROCEDURE — 3008F PR BODY MASS INDEX (BMI) DOCUMENTED: ICD-10-PCS | Mod: CPTII,S$GLB,, | Performed by: STUDENT IN AN ORGANIZED HEALTH CARE EDUCATION/TRAINING PROGRAM

## 2023-10-20 PROCEDURE — 4010F PR ACE/ARB THEARPY RXD/TAKEN: ICD-10-PCS | Mod: CPTII,S$GLB,, | Performed by: STUDENT IN AN ORGANIZED HEALTH CARE EDUCATION/TRAINING PROGRAM

## 2023-10-20 PROCEDURE — 3008F BODY MASS INDEX DOCD: CPT | Mod: CPTII,S$GLB,, | Performed by: STUDENT IN AN ORGANIZED HEALTH CARE EDUCATION/TRAINING PROGRAM

## 2023-10-20 PROCEDURE — 3078F DIAST BP <80 MM HG: CPT | Mod: CPTII,S$GLB,, | Performed by: STUDENT IN AN ORGANIZED HEALTH CARE EDUCATION/TRAINING PROGRAM

## 2023-10-20 PROCEDURE — 3061F PR NEG MICROALBUMINURIA RESULT DOCUMENTED/REVIEW: ICD-10-PCS | Mod: CPTII,S$GLB,, | Performed by: STUDENT IN AN ORGANIZED HEALTH CARE EDUCATION/TRAINING PROGRAM

## 2023-10-20 PROCEDURE — 3075F PR MOST RECENT SYSTOLIC BLOOD PRESS GE 130-139MM HG: ICD-10-PCS | Mod: CPTII,S$GLB,, | Performed by: STUDENT IN AN ORGANIZED HEALTH CARE EDUCATION/TRAINING PROGRAM

## 2023-10-20 PROCEDURE — 1159F MED LIST DOCD IN RCRD: CPT | Mod: CPTII,S$GLB,, | Performed by: STUDENT IN AN ORGANIZED HEALTH CARE EDUCATION/TRAINING PROGRAM

## 2023-10-20 PROCEDURE — 99999 PR PBB SHADOW E&M-EST. PATIENT-LVL III: ICD-10-PCS | Mod: PBBFAC,,, | Performed by: STUDENT IN AN ORGANIZED HEALTH CARE EDUCATION/TRAINING PROGRAM

## 2023-10-20 PROCEDURE — 3066F PR DOCUMENTATION OF TREATMENT FOR NEPHROPATHY: ICD-10-PCS | Mod: CPTII,S$GLB,, | Performed by: STUDENT IN AN ORGANIZED HEALTH CARE EDUCATION/TRAINING PROGRAM

## 2023-10-20 PROCEDURE — 3061F NEG MICROALBUMINURIA REV: CPT | Mod: CPTII,S$GLB,, | Performed by: STUDENT IN AN ORGANIZED HEALTH CARE EDUCATION/TRAINING PROGRAM

## 2023-10-20 PROCEDURE — 4010F ACE/ARB THERAPY RXD/TAKEN: CPT | Mod: CPTII,S$GLB,, | Performed by: STUDENT IN AN ORGANIZED HEALTH CARE EDUCATION/TRAINING PROGRAM

## 2023-10-20 PROCEDURE — 99999 PR PBB SHADOW E&M-EST. PATIENT-LVL III: CPT | Mod: PBBFAC,,, | Performed by: STUDENT IN AN ORGANIZED HEALTH CARE EDUCATION/TRAINING PROGRAM

## 2023-10-20 PROCEDURE — 99213 OFFICE O/P EST LOW 20 MIN: CPT | Mod: S$GLB,,, | Performed by: STUDENT IN AN ORGANIZED HEALTH CARE EDUCATION/TRAINING PROGRAM

## 2023-10-20 PROCEDURE — 99213 PR OFFICE/OUTPT VISIT, EST, LEVL III, 20-29 MIN: ICD-10-PCS | Mod: S$GLB,,, | Performed by: STUDENT IN AN ORGANIZED HEALTH CARE EDUCATION/TRAINING PROGRAM

## 2023-10-20 PROCEDURE — 3051F HG A1C>EQUAL 7.0%<8.0%: CPT | Mod: CPTII,S$GLB,, | Performed by: STUDENT IN AN ORGANIZED HEALTH CARE EDUCATION/TRAINING PROGRAM

## 2023-10-20 PROCEDURE — 3078F PR MOST RECENT DIASTOLIC BLOOD PRESSURE < 80 MM HG: ICD-10-PCS | Mod: CPTII,S$GLB,, | Performed by: STUDENT IN AN ORGANIZED HEALTH CARE EDUCATION/TRAINING PROGRAM

## 2023-10-20 PROCEDURE — 3066F NEPHROPATHY DOC TX: CPT | Mod: CPTII,S$GLB,, | Performed by: STUDENT IN AN ORGANIZED HEALTH CARE EDUCATION/TRAINING PROGRAM

## 2023-10-20 PROCEDURE — 3051F PR MOST RECENT HEMOGLOBIN A1C LEVEL 7.0 - < 8.0%: ICD-10-PCS | Mod: CPTII,S$GLB,, | Performed by: STUDENT IN AN ORGANIZED HEALTH CARE EDUCATION/TRAINING PROGRAM

## 2023-10-20 PROCEDURE — 3075F SYST BP GE 130 - 139MM HG: CPT | Mod: CPTII,S$GLB,, | Performed by: STUDENT IN AN ORGANIZED HEALTH CARE EDUCATION/TRAINING PROGRAM

## 2023-10-20 PROCEDURE — 1159F PR MEDICATION LIST DOCUMENTED IN MEDICAL RECORD: ICD-10-PCS | Mod: CPTII,S$GLB,, | Performed by: STUDENT IN AN ORGANIZED HEALTH CARE EDUCATION/TRAINING PROGRAM

## 2023-10-20 RX ORDER — FLUTICASONE PROPIONATE 50 MCG
1 SPRAY, SUSPENSION (ML) NASAL DAILY
Qty: 16 ML | Refills: 11 | Status: SHIPPED | OUTPATIENT
Start: 2023-10-20

## 2023-10-20 RX ORDER — AZELASTINE 1 MG/ML
2 SPRAY, METERED NASAL 2 TIMES DAILY
Qty: 30 ML | Refills: 11 | Status: SHIPPED | OUTPATIENT
Start: 2023-10-20 | End: 2024-10-19

## 2023-10-20 RX ORDER — LEVOCETIRIZINE DIHYDROCHLORIDE 5 MG/1
5 TABLET, FILM COATED ORAL NIGHTLY
Qty: 30 TABLET | Refills: 11 | Status: SHIPPED | OUTPATIENT
Start: 2023-10-20 | End: 2024-10-19

## 2023-10-20 NOTE — PROGRESS NOTES
63 year old woman presenting for large lung nodule noted on CT Chest. A review of imaging shows that the abnormality was present on radiograph and appears to be the same size. Unfortunately- the patient does not have a prior CT.   Patient is low risk. Based on my review of her imaging I expect this is a benign hamartoma with unchanged dimensions since 2013 (at least). Shared decision making was initiated and patient would like follow up imaging in 6 months and after that if it appears stable she would like to discontinue surveillance.

## 2023-10-20 NOTE — ASSESSMENT & PLAN NOTE
Right hilar mass which has been present on CXR per my review since 2013. CT scan performed and lesion noted to be circumscribed and with slight heterogeneity c/w fat density, most c/w hamartoma per Radiology. History of TB could point to residual granuloma, but lower suspicion given placement and density of lesion. She has had no prior CTs; discussed that lesion has remained stable for >10 years which is reassuring.     Shared decision-making engaged; pt opts for repeat CT in 6 months to ensure stability. If at that time the lesion is stable, likely no further surveillance needed.

## 2023-10-20 NOTE — PROGRESS NOTES
Subjective:      Patient ID: Joanie Chandler is a 63 y.o. female.    Chief Complaint: Mass    63F with DM and HTN, prior hx of TB infection in her early 20s (fully treated) presenting for evaluation of pulmonary nodule.  Well-circumscribed right hilar lesion on CT, visible as well on x-ray going back to 2012 with same appearance.   No pulmonary symptoms, denies chest pain, cough, wheeze, SOB.  Does assert sinus congestion and PND at seasonal changes.  No smoking history  Past infections only TB and covid in , fully recovered from both.  Family history significant for breast cancer in two sisters; pt with up to date mammogram. ESRD on HD in her mother, both parents  of heart disease.  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  From [place].        Review of Systems   Constitutional: Negative.    HENT: Negative.     Eyes: Negative.    Respiratory: Negative.     Cardiovascular: Negative.    Genitourinary: Negative.    Endocrine: endocrine negative    Musculoskeletal: Negative.    Skin: Negative.    Gastrointestinal: Negative.    Neurological: Negative.    Psychiatric/Behavioral: Negative.       Objective:     Physical Exam   Constitutional: She is oriented to person, place, and time. She appears well-developed and well-nourished.   HENT:   Head: Normocephalic.   Cardiovascular: Normal rate, regular rhythm and normal heart sounds.   Pulmonary/Chest: Normal expansion and breath sounds normal. No stridor. She has no wheezes. She has no rhonchi. She has no rales.   Abdominal: She exhibits no distension. There is no abdominal tenderness.   Musculoskeletal:         General: No edema. Normal range of motion.      Cervical back: Normal range of motion.   Neurological: She is alert and oriented to person, place, and time.   Skin: Skin is warm and dry. No cyanosis. Nails show no clubbing.   Psychiatric: She has a normal mood and affect. Her behavior is normal. Judgment and thought content  "normal.     Personal Diagnostic Review    EXAMINATION:  CT CHEST WITH CONTRAST     CLINICAL HISTORY:  Abnormal xray - lung nodule, >= 1 cm;Lung nodule, > 8mm;Solitary pulmonary nodule     TECHNIQUE:  Low dose axial images, sagittal and coronal reformations were obtained from the thoracic inlet to the lung bases following the IV administration of 75 mL of Omnipaque 350.     COMPARISON:  Chest radiographs dating back to 2013.  No prior CT.     FINDINGS:  There are few small nodules in the left lung measuring up to 3 mm.     Correlating with the finding on the prior radiographs, abutting the right hilar structures is a 2.7 x 2.3 by 2.5 cm slightly heterogeneous well-circumscribed hypodense mass.  The mass contains some foci which measure fat density.  Overall this size is not detrimentally changed compared to 2016 radiographs.     The aorta contains no aneurysm.  There is no pericardial or pleural effusion.  No significant mediastinal or hilar lymphadenopathy is present.     There are few subcentimeter hypodensities in the spleen, too small to characterize.     Impression:     Mass at the right hilum, stable compared to prior radiographs.  Stability for such a long time would indicate a benign etiology and the CT appearance is compatible with hamartoma.     Few tiny left lung nodules for which no further evaluation/follow-up is recommended.     A few subcentimeter splenic hypodensities, too small to characterize.  The most common etiologies would include splenic cysts or hemangiomas.             10/20/2023     1:02 PM 10/17/2023    11:07 AM 9/19/2023     7:39 AM 8/3/2023     8:00 AM 4/11/2023    11:15 AM 2/24/2023     8:26 AM 12/7/2022    12:38 PM   Pulmonary Function Tests   SpO2  97 % 99 % 99 % 97 %     Height 4' 11" (1.499 m) 4' 11" (1.499 m) 4' 11" (1.499 m) 4' 11" (1.499 m) 4' 11" (1.499 m)     Weight 76.9 kg (169 lb 8.5 oz) 76.6 kg (168 lb 14 oz) 77.5 kg (170 lb 13.7 oz) 77 kg (169 lb 12.1 oz) 73.6 kg (162 lb 4.1 " oz) 78 kg (172 lb) 80.3 kg (177 lb)   BMI (Calculated) 34.2 34.1 34.5 34.3 32.8          Assessment:     1. Abnormal CT lung screening         Outpatient Encounter Medications as of 10/20/2023   Medication Sig Dispense Refill    cetirizine (ZYRTEC) 10 MG tablet Take 10 mg by mouth once daily.      chlorhexidine (PERIDEX) 0.12 % solution 15 mLs 2 (two) times daily.      diclofenac sodium (VOLTAREN ARTHRITIS PAIN) 1 % Gel Apply 2 g topically 2 (two) times daily as needed (pain). 100 each 1    dulaglutide (TRULICITY) 4.5 mg/0.5 mL pen injector Inject 4.5 mg (one pen) into the skin every 7 days. 4 pen 2    fluticasone propionate (FLONASE) 50 mcg/actuation nasal spray 1 spray (50 mcg total) by Each Nostril route once daily. 16 g 0    lancing device Misc One device, used to check blood glucose, ICD-10: E11.9 (Patient not taking: Reported on 8/3/2023) 1 each 0    [] meloxicam (MOBIC) 15 MG tablet Take 1 tablet (15 mg total) by mouth once daily. 30 tablet 0    metFORMIN (GLUCOPHAGE-XR) 500 MG ER 24hr tablet Take 1 tablet (500 mg total) by mouth 2 (two) times daily with meals. 180 tablet 2    olopatadine (PATANASE) 0.6 % nasal spray 2 sprays by Each Nostril route 2 (two) times daily.      rosuvastatin (CRESTOR) 20 MG tablet       valsartan (DIOVAN) 80 MG tablet Take 1 tablet (80 mg total) by mouth once daily. 90 tablet 2     No facility-administered encounter medications on file as of 10/20/2023.     No orders of the defined types were placed in this encounter.      Plan:     Problem List Items Addressed This Visit          ENT    Seasonal allergic rhinitis due to pollen    Current Assessment & Plan     Rhinitis and PND at seasonal changes  Sent flonase and azelastine, instructed to clear with saline BID.  Sent xyzal to take nightly  If ineffective, can refer to A&I            Pulmonary    Abnormal CT lung screening    Solitary pulmonary nodule - Primary    Current Assessment & Plan     Right hilar mass which has been  present on CXR per my review since 2013. CT scan performed and lesion noted to be circumscribed and with slight heterogeneity c/w fat density, most c/w hamartoma per Radiology. History of TB could point to residual granuloma, but lower suspicion given placement and density of lesion. She has had no prior CTs; discussed that lesion has remained stable for >10 years which is reassuring.     Shared decision-making engaged; pt opts for repeat CT in 6 months to ensure stability. If at that time the lesion is stable, likely no further surveillance needed.         Relevant Orders    CT Chest Without Contrast      Pt seen with Dr. Armenta attending; repeat CT non-con in 6 mo and can call with results. Return if needed at that time, or PRN.      Luigi Valdivia MD  [facility]

## 2023-10-20 NOTE — ASSESSMENT & PLAN NOTE
Rhinitis and PND at seasonal changes  Sent flonase and azelastine, instructed to clear with saline BID.  Sent xyzal to take nightly  If ineffective, can refer to A&I

## 2023-10-23 DIAGNOSIS — I10 ESSENTIAL HYPERTENSION: ICD-10-CM

## 2023-10-23 NOTE — TELEPHONE ENCOUNTER
----- Message from Nancy Segura sent at 10/23/2023 10:07 AM CDT -----  Type: Patient Call Back    Who called:pt     What is the request in detail:pt requesting to speak to nurse in regards to getting trulicity refill. Medication not in medication list. Call pt     Can the clinic reply by [facility]?    Would the patient rather a call back or a response via [facility]? call    Best call back number:[phone] (home)       Additional Information:

## 2023-10-23 NOTE — TELEPHONE ENCOUNTER
Care Due:                  Date            Visit Type   Department     Provider  --------------------------------------------------------------------------------                                EP -                              PRIMARY      [facility]  Last Visit: 09-      CARE ([facility])   BRYON Castanon                              EP -                              PRIMARY      [facility]  Next Visit: 04-      CARE ([facility])   BRYON Castanon                                                            Last  Test          Frequency    Reason                     Performed    Due Date  --------------------------------------------------------------------------------    CBC.........  12 months..  diclofenac...............  09- 09-    [facility] Embedded Care Due Messages. Reference number: [id number].   10/23/2023 3:59:46 PM CDT

## 2023-10-23 NOTE — TELEPHONE ENCOUNTER
----- Message from Jackelin Villatoroas sent at 10/23/2023 11:40 AM CDT -----  Regarding: self [phone]  .Type: RX Refill Request    Who Called: self     Have you contacted your pharmacy: yes     Refill or New Rx: refill     RX Name and Strength: valsartan (DIOVAN) 80 MG tablet  Is this a 30 day or 90 day RX: 90 day    Preferred Pharmacy with phone number: ..  [id number] [place] [address]  [address] [place] [address] [address]  [facility] [place] [address]  Phone: [phone] Fax: [phone]    Local or Mail Order: local     Would the patient rather a call back or a response via [facility]?     Best Call Back Number: .[phone]

## 2023-10-23 NOTE — TELEPHONE ENCOUNTER
Last Office Visit Info:   The patient's last visit with Leni Castanon MD was on 9/18/2023.    The patient's last visit in current department was on 10/17/2023.        Last CBC Results:   Lab Results   Component Value Date    WBC 8.33 09/29/2022    HGB 14.2 09/29/2022    HCT 45.9 09/29/2022     09/29/2022       Last CMP Results  Lab Results   Component Value Date     09/29/2022    K 3.9 09/29/2022     09/29/2022    CO2 27 09/29/2022    BUN 10 09/29/2022    CREATININE 0.8 08/22/2023    CALCIUM 10.0 09/29/2022    ALBUMIN 3.8 09/29/2022    AST 13 09/29/2022    ALT 15 09/29/2022       Last Lipids  Lab Results   Component Value Date    CHOL 125 02/17/2023    TRIG 53 02/17/2023    HDL 50 02/17/2023    LDLCALC 64.4 02/17/2023       Last A1C  Lab Results   Component Value Date    HGBA1C 7.4 (H) 08/18/2023       Last TSH  Lab Results   Component Value Date    TSH 0.741 03/11/2021             Current Med Refills  Medication List with Changes/Refills   Current Medications    AZELASTINE (ASTELIN) 137 MCG (0.1 %) NASAL SPRAY    2 sprays (274 mcg total) by Nasal route 2 (two) times daily.       Start Date: 10/20/2023End Date: 10/19/2024    CHLORHEXIDINE (PERIDEX) 0.12 % SOLUTION    15 mLs 2 (two) times daily.       Start Date: 6/18/2022 End Date: --    DICLOFENAC SODIUM (VOLTAREN ARTHRITIS PAIN) 1 % GEL    Apply 2 g topically 2 (two) times daily as needed (pain).       Start Date: 9/19/2023 End Date: --    DULAGLUTIDE (TRULICITY) 4.5 MG/0.5 ML PEN INJECTOR    Inject 4.5 mg (one pen) into the skin every 7 days.       Start Date: 9/12/2023 End Date: 9/11/2024    FLUTICASONE PROPIONATE (FLONASE) 50 MCG/ACTUATION NASAL SPRAY    1 spray (50 mcg total) by Each Nostril route once daily.       Start Date: 4/11/2023 End Date: --    FLUTICASONE PROPIONATE (FLONASE) 50 MCG/ACTUATION NASAL SPRAY    1 spray (50 mcg total) by Each Nostril route once daily.       Start Date: 10/20/2023End Date: --    LANCING DEVICE MISC     One device, used to check blood glucose, ICD-10: E11.9       Start Date: 4/8/2021  End Date: --    LEVOCETIRIZINE (XYZAL) 5 MG TABLET    Take 1 tablet (5 mg total) by mouth every evening.       Start Date: 10/20/2023End Date: 10/19/2024    METFORMIN (GLUCOPHAGE-XR) 500 MG ER 24HR TABLET    Take 1 tablet (500 mg total) by mouth 2 (two) times daily with meals.       Start Date: 1/19/2023 End Date: 1/19/2024    ROSUVASTATIN (CRESTOR) 20 MG TABLET           Start Date: 6/28/2023 End Date: --    VALSARTAN (DIOVAN) 80 MG TABLET    Take 1 tablet (80 mg total) by mouth once daily.       Start Date: 9/29/2022 End Date: --       Order(s) placed per written order guidelines: none    Please advise.

## 2023-10-24 DIAGNOSIS — E11.9 TYPE 2 DIABETES MELLITUS WITHOUT COMPLICATION, WITHOUT LONG-TERM CURRENT USE OF INSULIN: ICD-10-CM

## 2023-10-24 RX ORDER — DULAGLUTIDE 4.5 MG/.5ML
4.5 INJECTION, SOLUTION SUBCUTANEOUS
Qty: 4 PEN | Refills: 2 | Status: SHIPPED | OUTPATIENT
Start: 2023-10-24 | End: 2023-11-03 | Stop reason: SDUPTHER

## 2023-10-24 RX ORDER — VALSARTAN 80 MG/1
80 TABLET ORAL DAILY
Qty: 90 TABLET | Refills: 2 | Status: SHIPPED | OUTPATIENT
Start: 2023-10-24

## 2023-10-24 NOTE — TELEPHONE ENCOUNTER
----- Message from Ramin Mark sent at 10/24/2023 12:29 PM CDT -----  Name of Who is Calling: AYAH SCHROEDER [[id number]]            What is the request in detail: Patient is requesting call back to get early refill being it was trashed dulaglutide (TRULICITY) 4.5 mg/0.5 mL pen injector        [facility] Can the clinic reply by [facility]: no              What Number to Call Back if not in [facility]: [phone]

## 2023-10-27 ENCOUNTER — PATIENT OUTREACH (OUTPATIENT)
Dept: ADMINISTRATIVE | Facility: HOSPITAL | Age: 63
End: 2023-10-27
Payer: COMMERCIAL

## 2023-10-27 NOTE — PROGRESS NOTES

## 2023-10-27 NOTE — LETTER
October 27, 2023    Joanie Chandler  [address]  [facility] [address]             [facility]  [address]  [facility] [address]  Phone: [phone] Dear Joanie Chandler        This message is being sent to you due to not being able to contact you by phone   [facility] is committed to your overall health.  Our records indicate that you are due for the following test and/or procedures:    Health Maintenance Due   Topic    Colorectal Cancer Screening     Eye Exam          Please call [phone] or [phone]   If you have had any of the above done at another facility, please let us know so that we may obtain copies from that facility.  If you have a copy of these records, please provide a copy for us to scan into your chart.  You are welcome to request that the report be faxed to us at  (FAX: [phone]).      Priscilla Avina MA  Care Coordinator for Leni Castanon MD

## 2023-11-03 ENCOUNTER — LAB VISIT (OUTPATIENT)
Dept: LAB | Facility: HOSPITAL | Age: 63
End: 2023-11-03
Attending: NURSE PRACTITIONER
Payer: COMMERCIAL

## 2023-11-03 ENCOUNTER — OFFICE VISIT (OUTPATIENT)
Dept: ENDOCRINOLOGY | Facility: CLINIC | Age: 63
End: 2023-11-03
Payer: COMMERCIAL

## 2023-11-03 VITALS
WEIGHT: 169 LBS | TEMPERATURE: 98 F | DIASTOLIC BLOOD PRESSURE: 78 MMHG | HEART RATE: 86 BPM | SYSTOLIC BLOOD PRESSURE: 138 MMHG | BODY MASS INDEX: 34.13 KG/M2

## 2023-11-03 DIAGNOSIS — E11.9 TYPE 2 DIABETES MELLITUS WITHOUT COMPLICATION, WITHOUT LONG-TERM CURRENT USE OF INSULIN: Primary | ICD-10-CM

## 2023-11-03 DIAGNOSIS — E78.5 HYPERLIPIDEMIA, UNSPECIFIED HYPERLIPIDEMIA TYPE: ICD-10-CM

## 2023-11-03 DIAGNOSIS — E11.9 TYPE 2 DIABETES MELLITUS WITHOUT COMPLICATION, WITHOUT LONG-TERM CURRENT USE OF INSULIN: ICD-10-CM

## 2023-11-03 DIAGNOSIS — E66.9 NON MORBID OBESITY, UNSPECIFIED OBESITY TYPE: ICD-10-CM

## 2023-11-03 PROCEDURE — 99999 PR PBB SHADOW E&M-EST. PATIENT-LVL III: ICD-10-PCS | Mod: PBBFAC,,, | Performed by: NURSE PRACTITIONER

## 2023-11-03 PROCEDURE — 3075F SYST BP GE 130 - 139MM HG: CPT | Mod: CPTII,S$GLB,, | Performed by: NURSE PRACTITIONER

## 2023-11-03 PROCEDURE — 1159F MED LIST DOCD IN RCRD: CPT | Mod: CPTII,S$GLB,, | Performed by: NURSE PRACTITIONER

## 2023-11-03 PROCEDURE — 3008F BODY MASS INDEX DOCD: CPT | Mod: CPTII,S$GLB,, | Performed by: NURSE PRACTITIONER

## 2023-11-03 PROCEDURE — 3078F PR MOST RECENT DIASTOLIC BLOOD PRESSURE < 80 MM HG: ICD-10-PCS | Mod: CPTII,S$GLB,, | Performed by: NURSE PRACTITIONER

## 2023-11-03 PROCEDURE — 1160F PR REVIEW ALL MEDS BY PRESCRIBER/CLIN PHARMACIST DOCUMENTED: ICD-10-PCS | Mod: CPTII,S$GLB,, | Performed by: NURSE PRACTITIONER

## 2023-11-03 PROCEDURE — 1159F PR MEDICATION LIST DOCUMENTED IN MEDICAL RECORD: ICD-10-PCS | Mod: CPTII,S$GLB,, | Performed by: NURSE PRACTITIONER

## 2023-11-03 PROCEDURE — 3051F PR MOST RECENT HEMOGLOBIN A1C LEVEL 7.0 - < 8.0%: ICD-10-PCS | Mod: CPTII,S$GLB,, | Performed by: NURSE PRACTITIONER

## 2023-11-03 PROCEDURE — 3061F NEG MICROALBUMINURIA REV: CPT | Mod: CPTII,S$GLB,, | Performed by: NURSE PRACTITIONER

## 2023-11-03 PROCEDURE — 99999 PR PBB SHADOW E&M-EST. PATIENT-LVL III: CPT | Mod: PBBFAC,,, | Performed by: NURSE PRACTITIONER

## 2023-11-03 PROCEDURE — 4010F ACE/ARB THERAPY RXD/TAKEN: CPT | Mod: CPTII,S$GLB,, | Performed by: NURSE PRACTITIONER

## 2023-11-03 PROCEDURE — 3066F NEPHROPATHY DOC TX: CPT | Mod: CPTII,S$GLB,, | Performed by: NURSE PRACTITIONER

## 2023-11-03 PROCEDURE — 36415 COLL VENOUS BLD VENIPUNCTURE: CPT | Performed by: NURSE PRACTITIONER

## 2023-11-03 PROCEDURE — 3075F PR MOST RECENT SYSTOLIC BLOOD PRESS GE 130-139MM HG: ICD-10-PCS | Mod: CPTII,S$GLB,, | Performed by: NURSE PRACTITIONER

## 2023-11-03 PROCEDURE — 3066F PR DOCUMENTATION OF TREATMENT FOR NEPHROPATHY: ICD-10-PCS | Mod: CPTII,S$GLB,, | Performed by: NURSE PRACTITIONER

## 2023-11-03 PROCEDURE — 83036 HEMOGLOBIN GLYCOSYLATED A1C: CPT | Performed by: NURSE PRACTITIONER

## 2023-11-03 PROCEDURE — 3051F HG A1C>EQUAL 7.0%<8.0%: CPT | Mod: CPTII,S$GLB,, | Performed by: NURSE PRACTITIONER

## 2023-11-03 PROCEDURE — 3008F PR BODY MASS INDEX (BMI) DOCUMENTED: ICD-10-PCS | Mod: CPTII,S$GLB,, | Performed by: NURSE PRACTITIONER

## 2023-11-03 PROCEDURE — 1160F RVW MEDS BY RX/DR IN RCRD: CPT | Mod: CPTII,S$GLB,, | Performed by: NURSE PRACTITIONER

## 2023-11-03 PROCEDURE — 99214 PR OFFICE/OUTPT VISIT, EST, LEVL IV, 30-39 MIN: ICD-10-PCS | Mod: S$GLB,,, | Performed by: NURSE PRACTITIONER

## 2023-11-03 PROCEDURE — 4010F PR ACE/ARB THEARPY RXD/TAKEN: ICD-10-PCS | Mod: CPTII,S$GLB,, | Performed by: NURSE PRACTITIONER

## 2023-11-03 PROCEDURE — 3078F DIAST BP <80 MM HG: CPT | Mod: CPTII,S$GLB,, | Performed by: NURSE PRACTITIONER

## 2023-11-03 PROCEDURE — 99214 OFFICE O/P EST MOD 30 MIN: CPT | Mod: S$GLB,,, | Performed by: NURSE PRACTITIONER

## 2023-11-03 PROCEDURE — 3061F PR NEG MICROALBUMINURIA RESULT DOCUMENTED/REVIEW: ICD-10-PCS | Mod: CPTII,S$GLB,, | Performed by: NURSE PRACTITIONER

## 2023-11-03 RX ORDER — DULAGLUTIDE 4.5 MG/.5ML
4.5 INJECTION, SOLUTION SUBCUTANEOUS
Qty: 12 PEN | Refills: 2 | Status: SHIPPED | OUTPATIENT
Start: 2023-11-03 | End: 2024-02-08

## 2023-11-03 RX ORDER — METFORMIN HYDROCHLORIDE 500 MG/1
500 TABLET, EXTENDED RELEASE ORAL 2 TIMES DAILY WITH MEALS
Qty: 180 TABLET | Refills: 2 | Status: SHIPPED | OUTPATIENT
Start: 2023-11-03 | End: 2024-02-07

## 2023-11-03 NOTE — PATIENT INSTRUCTIONS
Labs today and will contact patient with results. Expect A1c will be high since pt has been out of Trulicity for almost a month.   Continue Trulicity and metformin.   Return to clinic in 3 months with labs prior.

## 2023-11-03 NOTE — PROGRESS NOTES
CC: This 63 y.o. Black or  female  is here for evaluation of  T2DM along with comorbidities indicated in the Visit Diagnosis section of this encounter.    HPI: Joanie Chandler was diagnosed with T2DM in ~ 2010.     DM COMPLICATIONS: none      Prior visit 2/2023  A1c is down from 12.2% in Sept to now 6.6%.   5 lb weight loss since lov.   She's feeling a lot better.   She has switched from atorvastatin to rosuvastatin. LDL cholesterol down from 110 to 64. Denies myalgias.   Plan A1c is at goal and patient denies hypoglycemia.   Will try to further optimize medication regimen by promoting more weight loss.  Finish out current supply of Trulicity (3 more pens). Then increase Trulicity to 4.5 mg once weekly. - potential side effects: nausea, diarrhea, constipation.   Stop glipizide due to risk of hypoglycemia when Trulicity 4.5 mg is started.    Continue metformin 500 mg twice daily.   Test blood sugar 2x/day, before breakfast and again 2 hours after a meal. Keep a sugar log.   If blood sugars are too high, we may need to add in Jardiance once daily.   Return to clinic in 3 months with labs prior.     Interval hx  No recent A1c available.   A1c braxton from 6.6% in Feb to 7.4% in August.     Has not been testing BGs because she thought her meter was broken. However, battery was turned around backward and meter is now working.   She did increase Trulicity to 4.5 mg after lov. Tolerated it well. Feels hungry since being off Trulicity the last few weeks. It was thrown out of her refrigerator by mistaken.       LAST DIABETES EDUCATION: never     HOSPITALIZED FOR DIABETES -  No.    SIGNIFICANT DIABETES MED HISTORY:  Metformin instant release - darrhea  Trulicity started 10/2022    PRESCRIBED DIABETES MEDICATIONS:   Metformin  mg bid   Trulicity 4.5 mg once weekly on Mondays      Misses medication doses - no Trulicity as above.       SELF MONITORING BLOOD GLUCOSE: Checks blood glucose at home - none recent,  as above.     HYPOGLYCEMIC EPISODES:  none     CURRENT DIET: Drinks water, crystal lite. Eats 3 meals/day.   Limits fried foods.       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     PHYSICAL EXAM:  GENERAL: Well developed, well nourished. No acute distress.   PSYCH: AAOx3, appropriate mood and affect, conversant, well-groomed. Judgement and insight good.   NEURO: Cranial nerves grossly intact. Speech clear, no tremor.   CHEST: Respirations even and unlabored.   SKIN: Normal skin turgor. Skin warm and dry. No areas of breakdown.     Foot exam:   Protective Sensation (w/ 10 gram monofilament):  Right: Intact  Left: Intact    Visual Inspection:  Skin Breakdown -  Neither    Pedal Pulses:   Right: Present  Left: Present    Posterior Tibialis Pulses:   Right:Present  Left: Present    Hemoglobin A1C   Date Value Ref Range Status   08/18/2023 7.4 (H) 4.0 - 5.6 % Final     Comment:     ADA Screening Guidelines:  5.7-6.4%  Consistent with prediabetes  >or=6.5%  Consistent with diabetes    High levels of fetal hemoglobin interfere with the HbA1C  assay. Heterozygous hemoglobin variants (HbS, HgC, etc)do  not significantly interfere with this assay.   However, presence of multiple variants may affect accuracy.     02/17/2023 6.6 (H) 4.0 - 5.6 % Final     Comment:     ADA Screening Guidelines:  5.7-6.4%  Consistent with prediabetes  >or=6.5%  Consistent with diabetes    High levels of fetal hemoglobin interfere with the HbA1C  assay. Heterozygous hemoglobin variants (HbS, HgC, etc)do  not significantly interfere with this assay.   However, presence of multiple variants may affect accuracy.     09/29/2022 12.2 (H) 4.0 - 5.6 % Final     Comment:     ADA Screening Guidelines:  5.7-6.4%   "Consistent with prediabetes  >or=6.5%  Consistent with diabetes    High levels of fetal hemoglobin interfere with the HbA1C  assay. Heterozygous hemoglobin variants (HbS, HgC, etc)do  not significantly interfere with this assay.   However, presence of multiple variants may affect accuracy.         No results found for: "CPEPTIDE", "GLUTAMICACID", "ISLETCELLANT", "FRUCTOSAMINE"     Lab Results   Component Value Date    CHOL 125 02/17/2023    CHOL 185 09/29/2022    CHOL 221 (H) 03/11/2021     Lab Results   Component Value Date    HDL 50 02/17/2023    HDL 55 09/29/2022    HDL 56 03/11/2021     Lab Results   Component Value Date    LDLCALC 64.4 02/17/2023    LDLCALC 110.6 09/29/2022    LDLCALC 143.2 03/11/2021     Lab Results   Component Value Date    TRIG 53 02/17/2023    TRIG 97 09/29/2022    TRIG 109 03/11/2021     Lab Results   Component Value Date    CHOLHDL 40.0 02/17/2023    CHOLHDL 29.7 09/29/2022    CHOLHDL 25.3 03/11/2021         Component Value Date/Time     09/29/2022 1425    K 3.9 09/29/2022 1425     09/29/2022 1425    CO2 27 09/29/2022 1425    BUN 10 09/29/2022 1425    CREATININE 0.8 08/22/2023 0916     (H) 09/29/2022 1425    CALCIUM 10.0 09/29/2022 1425    ALKPHOS 119 09/29/2022 1425    AST 13 09/29/2022 1425    ALT 15 09/29/2022 1425    BILITOT 0.3 09/29/2022 1425    EGFRNORACEVR >60 08/22/2023 0916    ESTGFRAFRICA >60 03/11/2021 1108         Lab Results   Component Value Date    LABMICR 5.0 10/17/2023    CREATRANDUR 154.0 10/17/2023    MICALBCREAT 3.2 10/17/2023             ASSESSMENT and PLAN:    A1C GOAL: < 7 %       1. Type 2 diabetes mellitus without complication, without long-term current use of insulin  Labs today and will contact patient with results. Expect A1c will be high since pt has been out of Trulicity for almost a month.   Continue Trulicity and metformin.   Return to clinic in 3 months with labs prior. Schedule eye exam.       Hemoglobin A1C    dulaglutide (TRULICITY) " 4.5 mg/0.5 mL pen injector    metFORMIN (GLUCOPHAGE-XR) 500 MG ER 24hr tablet      2. Hyperlipidemia, unspecified hyperlipidemia type  Lipid Panel      3. Non morbid obesity, unspecified obesity type  Resume Trulicity             Orders Placed This Encounter   Procedures    Hemoglobin A1C     Standing Status:   Future     Standing Expiration Date:   1/1/2025    Lipid Panel     Standing Status:   Future     Standing Expiration Date:   11/2/2024          Follow up in about 3 months (around 2/3/2024).

## 2023-11-04 LAB
ESTIMATED AVG GLUCOSE: 189 MG/DL (ref 68–131)
HBA1C MFR BLD: 8.2 % (ref 4–5.6)

## 2023-11-06 ENCOUNTER — TELEPHONE (OUTPATIENT)
Dept: ENDOCRINOLOGY | Facility: CLINIC | Age: 63
End: 2023-11-06
Payer: COMMERCIAL

## 2023-11-06 NOTE — TELEPHONE ENCOUNTER
----- Message from Milady Hall NP sent at 11/6/2023  9:07 AM CST -----  A1c braxton is higher as expected, from 7.4 to now 8.2%. We would like to see it < 7%.

## 2023-12-08 ENCOUNTER — PATIENT OUTREACH (OUTPATIENT)
Dept: ADMINISTRATIVE | Facility: HOSPITAL | Age: 63
End: 2023-12-08
Payer: COMMERCIAL

## 2023-12-08 NOTE — PROGRESS NOTES
Population Health Chart Review & Patient Outreach Details  Payor report. -- medication compliance metformin ER and Trulicity --  Confirm still taking.     Further Action Needed If Patient Returns Outreach:      Need to schedule eye exam and colon screening.       Updates Requested / Reviewed:     [x]  Care Everywhere    []     []  External Sources ([facility], [facility], DIS, etc.)    [] [facility]   [] [facility]   [] Other:    []  Care Team Updated   []  Removed  or Duplicate Orders   []  Immunization Reconciliation Completed / Queried    [] [place]   [] [place]   [] [place]   [] [place]      Health Maintenance Topics Addressed and Outreach Outcomes / Actions Taken:             Breast Cancer Screening []  Mammogram Order Placed    []  Mammogram Screening Scheduled    []  External Records Requested & Care Team Updated if Applicable    []  External Records Uploaded & Care Team Updated if Applicable    []  Pt Declined Scheduling Mammogram    []  Pt Will Schedule with External Provider / Order Routed & Care Team Updated if Applicable              Cervical Cancer Screening []  Pap Smear Scheduled in Primary Care or OBGYN    []  External Records Requested & Care Team Updated if Applicable       []  External Records Uploaded, Care Team Updated, & History Updated if Applicable    []  Patient Declined Scheduling Pap Smear    []  Patient Will Schedule with External Provider & Care Team Updated if Applicable                  Colorectal Cancer Screening []  Colonoscopy Case Request / Referral / Home Test Order Placed    []  External Records Requested & Care Team Updated if Applicable    []  External Records Uploaded, Care Team Updated, & History Updated if Applicable    []  Patient Declined Completing Colon Cancer Screening    []  Patient Will Schedule with External Provider & Care Team Updated if Applicable    []  Fit Kit Mailed (add the SmartPhrase under additional notes)    []  Reminded Patient to Complete Home  Test                Diabetic Eye Exam []  Eye Exam Screening Order Placed    []  Eye Camera Scheduled or Optometry/Ophthalmology Referral Placed    []  External Records Requested & Care Team Updated if Applicable    []  External Records Uploaded, Care Team Updated, & History Updated if Applicable    []  Patient Declined Scheduling Eye Exam    []  Patient Will Schedule with External Provider & Care Team Updated if Applicable             Blood Pressure Control []  Primary Care Follow Up Visit Scheduled     []  Remote Blood Pressure Reading Captured    []  Patient Declined Remote Reading or Scheduling Appt - Escalated to PCP    []  Patient Will Call Back or Send Portal Message with Reading                 HbA1c & Other Labs []  Overdue Lab(s) Ordered    []  Overdue Lab(s) Scheduled    []  External Records Uploaded & Care Team Updated if Applicable    []  Primary Care Follow Up Visit Scheduled     []  Reminded Patient to Complete A1c Home Test    []  Patient Declined Scheduling Labs or Will Call Back to Schedule    []  Patient Will Schedule with External Provider / Order Routed, & Care Team Updated if Applicable           Primary Care Appointment []  Primary Care Appt Scheduled    []  Patient Declined Scheduling or Will Call Back to Schedule    []  Pt Established with External Provider, Updated Care Team, & Informed Pt to Notify Payor if Applicable           Medication Adherence /    Statin Use []  Primary Care Appointment Scheduled    []  Patient Reminded to  Prescription    []  Patient Declined, Provider Notified if Needed    []  Sent Provider Message to Review to Evaluate Pt for Statin, Add Exclusion Dx Codes, Document   Exclusion in Problem List, Change Statin Intensity Level to Moderate or High Intensity if Applicable                Osteoporosis Screening []  Dexa Order Placed    []  Dexa Appointment Scheduled    []  External Records Requested & Care Team Updated    []  External Records Uploaded, Care Team  Updated, & History Updated if Applicable    []  Patient Declined Scheduling Dexa or Will Call Back to Schedule    []  Patient Will Schedule with External Provider / Order Routed & Care Team Updated if Applicable       Additional Notes:

## 2024-01-03 RX ORDER — ROSUVASTATIN CALCIUM 20 MG/1
20 TABLET, COATED ORAL
Qty: 90 TABLET | Refills: 0 | Status: SHIPPED | OUTPATIENT
Start: 2024-01-03 | End: 2024-02-08 | Stop reason: SDUPTHER

## 2024-01-26 ENCOUNTER — PATIENT OUTREACH (OUTPATIENT)
Dept: ADMINISTRATIVE | Facility: HOSPITAL | Age: 64
End: 2024-01-26
Payer: COMMERCIAL

## 2024-01-26 NOTE — PROGRESS NOTES
Population Health Chart Review & Patient Outreach Details  Due for overdue HM (eye exam/eye cam at next visit, colon screening), need to get recent records if already done. If not done, orders/referrals need to be place and schedule. Please forward messages to me. Unable to LM .      Further Action Needed If Patient Returns Outreach:      Please advise of message above.      Updates Requested / Reviewed:     [x]  Care Everywhere    []     []  External Sources ([facility], [facility], [facility], etc.)    [] [facility]   [] [facility]   [] Other:    []  Care Team Updated   []  Removed  or Duplicate Orders   []  Immunization Reconciliation Completed / Queried    [] [place]   [] [place]   [] [place]   [] [place]      Health Maintenance Topics Addressed and Outreach Outcomes / Actions Taken:             Breast Cancer Screening []  Mammogram Order Placed    []  Mammogram Screening Scheduled    []  External Records Requested & Care Team Updated if Applicable    []  External Records Uploaded & Care Team Updated if Applicable    []  Pt Declined Scheduling Mammogram    []  Pt Will Schedule with External Provider / Order Routed & Care Team Updated if Applicable              Cervical Cancer Screening []  Pap Smear Scheduled in Primary Care or OBGYN    []  External Records Requested & Care Team Updated if Applicable       []  External Records Uploaded, Care Team Updated, & History Updated if Applicable    []  Patient Declined Scheduling Pap Smear    []  Patient Will Schedule with External Provider & Care Team Updated if Applicable                  Colorectal Cancer Screening []  Colonoscopy Case Request / Referral / Home Test Order Placed    []  External Records Requested & Care Team Updated if Applicable    []  External Records Uploaded, Care Team Updated, & History Updated if Applicable    []  Patient Declined Completing Colon Cancer Screening    []  Patient Will Schedule with External Provider & Care Team Updated if  Applicable    []  Fit Kit Mailed (add the SmartPhrase under additional notes)    []  Reminded Patient to Complete Home Test                Diabetic Eye Exam []  Eye Exam Screening Order Placed    []  Eye Camera Scheduled or Optometry/Ophthalmology Referral Placed    []  External Records Requested & Care Team Updated if Applicable    []  External Records Uploaded, Care Team Updated, & History Updated if Applicable    []  Patient Declined Scheduling Eye Exam    []  Patient Will Schedule with External Provider & Care Team Updated if Applicable             Blood Pressure Control []  Primary Care Follow Up Visit Scheduled     []  Remote Blood Pressure Reading Captured    []  Patient Declined Remote Reading or Scheduling Appt - Escalated to PCP    []  Patient Will Call Back or Send Portal Message with Reading                 HbA1c & Other Labs []  Overdue Lab(s) Ordered    []  Overdue Lab(s) Scheduled    []  External Records Uploaded & Care Team Updated if Applicable    []  Primary Care Follow Up Visit Scheduled     []  Reminded Patient to Complete A1c Home Test    []  Patient Declined Scheduling Labs or Will Call Back to Schedule    []  Patient Will Schedule with External Provider / Order Routed, & Care Team Updated if Applicable           Primary Care Appointment []  Primary Care Appt Scheduled    []  Patient Declined Scheduling or Will Call Back to Schedule    []  Pt Established with External Provider, Updated Care Team, & Informed Pt to Notify Payor if Applicable           Medication Adherence /    Statin Use []  Primary Care Appointment Scheduled    []  Patient Reminded to  Prescription    []  Patient Declined, Provider Notified if Needed    []  Sent Provider Message to Review to Evaluate Pt for Statin, Add Exclusion Dx Codes, Document   Exclusion in Problem List, Change Statin Intensity Level to Moderate or High Intensity if Applicable                Osteoporosis Screening []  Dexa Order Placed    []  Dexa  Appointment Scheduled    []  External Records Requested & Care Team Updated    []  External Records Uploaded, Care Team Updated, & History Updated if Applicable    []  Patient Declined Scheduling Dexa or Will Call Back to Schedule    []  Patient Will Schedule with External Provider / Order Routed & Care Team Updated if Applicable       Additional Notes:

## 2024-01-29 DIAGNOSIS — E11.00 TYPE 2 DIABETES MELLITUS WITH HYPEROSMOLARITY WITHOUT COMA, WITHOUT LONG-TERM CURRENT USE OF INSULIN: Primary | ICD-10-CM

## 2024-02-03 ENCOUNTER — LAB VISIT (OUTPATIENT)
Dept: LAB | Facility: HOSPITAL | Age: 64
End: 2024-02-03
Attending: NURSE PRACTITIONER
Payer: COMMERCIAL

## 2024-02-03 DIAGNOSIS — E11.00 TYPE 2 DIABETES MELLITUS WITH HYPEROSMOLARITY WITHOUT COMA, WITHOUT LONG-TERM CURRENT USE OF INSULIN: ICD-10-CM

## 2024-02-03 DIAGNOSIS — E78.5 HYPERLIPIDEMIA, UNSPECIFIED HYPERLIPIDEMIA TYPE: ICD-10-CM

## 2024-02-03 DIAGNOSIS — E11.9 TYPE 2 DIABETES MELLITUS WITHOUT COMPLICATION, WITHOUT LONG-TERM CURRENT USE OF INSULIN: ICD-10-CM

## 2024-02-03 LAB
ALBUMIN SERPL BCP-MCNC: 3.4 G/DL (ref 3.5–5.2)
ALP SERPL-CCNC: 94 U/L (ref 55–135)
ALT SERPL W/O P-5'-P-CCNC: 12 U/L (ref 10–44)
ANION GAP SERPL CALC-SCNC: 8 MMOL/L (ref 8–16)
AST SERPL-CCNC: 14 U/L (ref 10–40)
BILIRUB SERPL-MCNC: 0.5 MG/DL (ref 0.1–1)
BUN SERPL-MCNC: 11 MG/DL (ref 8–23)
CALCIUM SERPL-MCNC: 9.3 MG/DL (ref 8.7–10.5)
CHLORIDE SERPL-SCNC: 108 MMOL/L (ref 95–110)
CHOLEST SERPL-MCNC: 140 MG/DL (ref 120–199)
CHOLEST/HDLC SERPL: 2.5 {RATIO} (ref 2–5)
CO2 SERPL-SCNC: 26 MMOL/L (ref 23–29)
CREAT SERPL-MCNC: 0.8 MG/DL (ref 0.5–1.4)
EST. GFR  (NO RACE VARIABLE): >60 ML/MIN/1.73 M^2
GLUCOSE SERPL-MCNC: 124 MG/DL (ref 70–110)
HDLC SERPL-MCNC: 55 MG/DL (ref 40–75)
HDLC SERPL: 39.3 % (ref 20–50)
LDLC SERPL CALC-MCNC: 75.6 MG/DL (ref 63–159)
NONHDLC SERPL-MCNC: 85 MG/DL
POTASSIUM SERPL-SCNC: 4.1 MMOL/L (ref 3.5–5.1)
PROT SERPL-MCNC: 7.2 G/DL (ref 6–8.4)
SODIUM SERPL-SCNC: 142 MMOL/L (ref 136–145)
TRIGL SERPL-MCNC: 47 MG/DL (ref 30–150)

## 2024-02-03 PROCEDURE — 80061 LIPID PANEL: CPT | Performed by: NURSE PRACTITIONER

## 2024-02-03 PROCEDURE — 83036 HEMOGLOBIN GLYCOSYLATED A1C: CPT | Performed by: NURSE PRACTITIONER

## 2024-02-03 PROCEDURE — 80053 COMPREHEN METABOLIC PANEL: CPT | Performed by: INTERNAL MEDICINE

## 2024-02-03 PROCEDURE — 36415 COLL VENOUS BLD VENIPUNCTURE: CPT | Performed by: NURSE PRACTITIONER

## 2024-02-04 LAB
ESTIMATED AVG GLUCOSE: 160 MG/DL (ref 68–131)
HBA1C MFR BLD: 7.2 % (ref 4–5.6)

## 2024-02-07 DIAGNOSIS — E11.9 TYPE 2 DIABETES MELLITUS WITHOUT COMPLICATION, WITHOUT LONG-TERM CURRENT USE OF INSULIN: ICD-10-CM

## 2024-02-07 RX ORDER — METFORMIN HYDROCHLORIDE 500 MG/1
500 TABLET, EXTENDED RELEASE ORAL 2 TIMES DAILY WITH MEALS
Qty: 180 TABLET | Refills: 2 | Status: SHIPPED | OUTPATIENT
Start: 2024-02-07

## 2024-02-08 ENCOUNTER — OFFICE VISIT (OUTPATIENT)
Dept: ENDOCRINOLOGY | Facility: CLINIC | Age: 64
End: 2024-02-08
Payer: COMMERCIAL

## 2024-02-08 VITALS
SYSTOLIC BLOOD PRESSURE: 112 MMHG | WEIGHT: 168 LBS | BODY MASS INDEX: 33.93 KG/M2 | DIASTOLIC BLOOD PRESSURE: 70 MMHG | TEMPERATURE: 99 F | HEART RATE: 73 BPM

## 2024-02-08 DIAGNOSIS — E11.9 TYPE 2 DIABETES MELLITUS WITHOUT COMPLICATION, WITHOUT LONG-TERM CURRENT USE OF INSULIN: Primary | ICD-10-CM

## 2024-02-08 DIAGNOSIS — E78.5 HYPERLIPIDEMIA, UNSPECIFIED HYPERLIPIDEMIA TYPE: ICD-10-CM

## 2024-02-08 DIAGNOSIS — E66.9 NON MORBID OBESITY, UNSPECIFIED OBESITY TYPE: ICD-10-CM

## 2024-02-08 PROCEDURE — 99999 PR PBB SHADOW E&M-EST. PATIENT-LVL III: CPT | Mod: PBBFAC,,, | Performed by: NURSE PRACTITIONER

## 2024-02-08 PROCEDURE — 3078F DIAST BP <80 MM HG: CPT | Mod: CPTII,S$GLB,, | Performed by: NURSE PRACTITIONER

## 2024-02-08 PROCEDURE — 3074F SYST BP LT 130 MM HG: CPT | Mod: CPTII,S$GLB,, | Performed by: NURSE PRACTITIONER

## 2024-02-08 PROCEDURE — 3008F BODY MASS INDEX DOCD: CPT | Mod: CPTII,S$GLB,, | Performed by: NURSE PRACTITIONER

## 2024-02-08 PROCEDURE — 1160F RVW MEDS BY RX/DR IN RCRD: CPT | Mod: CPTII,S$GLB,, | Performed by: NURSE PRACTITIONER

## 2024-02-08 PROCEDURE — 99214 OFFICE O/P EST MOD 30 MIN: CPT | Mod: S$GLB,,, | Performed by: NURSE PRACTITIONER

## 2024-02-08 PROCEDURE — 3051F HG A1C>EQUAL 7.0%<8.0%: CPT | Mod: CPTII,S$GLB,, | Performed by: NURSE PRACTITIONER

## 2024-02-08 PROCEDURE — 1159F MED LIST DOCD IN RCRD: CPT | Mod: CPTII,S$GLB,, | Performed by: NURSE PRACTITIONER

## 2024-02-08 RX ORDER — INSULIN PUMP SYRINGE, 3 ML
EACH MISCELLANEOUS
Qty: 1 EACH | Refills: 0 | Status: SHIPPED | OUTPATIENT
Start: 2024-02-08 | End: 2024-02-08

## 2024-02-08 RX ORDER — ROSUVASTATIN CALCIUM 20 MG/1
20 TABLET, COATED ORAL DAILY
Qty: 90 TABLET | Refills: 2 | Status: SHIPPED | OUTPATIENT
Start: 2024-02-08

## 2024-02-08 RX ORDER — INSULIN PUMP SYRINGE, 3 ML
EACH MISCELLANEOUS
Qty: 1 EACH | Refills: 0 | Status: SHIPPED | OUTPATIENT
Start: 2024-02-08 | End: 2025-02-07

## 2024-02-08 RX ORDER — LANCETS
EACH MISCELLANEOUS
Qty: 200 EACH | Refills: 3 | Status: SHIPPED | OUTPATIENT
Start: 2024-02-08

## 2024-02-08 NOTE — PROGRESS NOTES
CC: This 63 y.o. Black or  female  is here for evaluation of  T2DM along with comorbidities indicated in the Visit Diagnosis section of this encounter.    HPI: Joanie Chandler was diagnosed with T2DM in ~ 2010.     DM COMPLICATIONS: none      Prior visit 11/2023  No recent A1c available.   A1c braxton from 6.6% in Feb to 7.4% in August.   Has not been testing BGs because she thought her meter was broken. However, battery was turned around backward and meter is now working.   She did increase Trulicity to 4.5 mg after lov. Tolerated it well. Feels hungry since being off Trulicity the last few weeks. It was thrown out of her refrigerator by mistaken.   Plan Labs today and will contact patient with results. Expect A1c will be high since pt has been out of Trulicity for almost a month.   Continue Trulicity and metformin.   Return to clinic in 3 months with labs prior. Schedule eye exam.       Interval hx  A1c is down from 8.2 to 7.2%.     Feels overwhelmed taking care of her sister who has advanced cancer.       LAST DIABETES EDUCATION: never     HOSPITALIZED FOR DIABETES -  No.    SIGNIFICANT DIABETES MED HISTORY:  Metformin instant release - diarrhea  Trulicity started 10/2022    PRESCRIBED DIABETES MEDICATIONS:   Metformin  mg bid   Trulicity 4.5 mg once weekly on Tuesdays      Misses medication doses - denies       SELF MONITORING BLOOD GLUCOSE: Checks blood glucose at home - none recent. Pt lost her meter.  When she last tested, evening BGs were 90-140s    HYPOGLYCEMIC EPISODES:  none     CURRENT DIET: Drinks water, crystal lite. Eats 3 meals/day.   Limits fried foods.   Eats a lot of chicken and fish.       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  "    PHYSICAL EXAM:  GENERAL: Well developed, well nourished. No acute distress.   PSYCH: AAOx3, appropriate mood and affect, conversant, well-groomed. Judgement and insight good.   NEURO: Cranial nerves grossly intact. Speech clear, no tremor.   CHEST: Respirations even and unlabored.   SKIN: Normal skin turgor. Skin warm and dry. No areas of breakdown.       Hemoglobin A1C   Date Value Ref Range Status   02/03/2024 7.2 (H) 4.0 - 5.6 % Final     Comment:     ADA Screening Guidelines:  5.7-6.4%  Consistent with prediabetes  >or=6.5%  Consistent with diabetes    High levels of fetal hemoglobin interfere with the HbA1C  assay. Heterozygous hemoglobin variants (HbS, HgC, etc)do  not significantly interfere with this assay.   However, presence of multiple variants may affect accuracy.     11/03/2023 8.2 (H) 4.0 - 5.6 % Final     Comment:     ADA Screening Guidelines:  5.7-6.4%  Consistent with prediabetes  >or=6.5%  Consistent with diabetes    High levels of fetal hemoglobin interfere with the HbA1C  assay. Heterozygous hemoglobin variants (HbS, HgC, etc)do  not significantly interfere with this assay.   However, presence of multiple variants may affect accuracy.     08/18/2023 7.4 (H) 4.0 - 5.6 % Final     Comment:     ADA Screening Guidelines:  5.7-6.4%  Consistent with prediabetes  >or=6.5%  Consistent with diabetes    High levels of fetal hemoglobin interfere with the HbA1C  assay. Heterozygous hemoglobin variants (HbS, HgC, etc)do  not significantly interfere with this assay.   However, presence of multiple variants may affect accuracy.         No results found for: "CPEPTIDE", "GLUTAMICACID", "ISLETCELLANT", "FRUCTOSAMINE"     Lab Results   Component Value Date    CHOL 140 02/03/2024    CHOL 125 02/17/2023    CHOL 185 09/29/2022     Lab Results   Component Value Date    HDL 55 02/03/2024    HDL 50 02/17/2023    HDL 55 09/29/2022     Lab Results   Component Value Date    LDLCALC 75.6 02/03/2024    LDLCALC 64.4 " 02/17/2023    LDLCALC 110.6 09/29/2022     Lab Results   Component Value Date    TRIG 47 02/03/2024    TRIG 53 02/17/2023    TRIG 97 09/29/2022     Lab Results   Component Value Date    CHOLHDL 39.3 02/03/2024    CHOLHDL 40.0 02/17/2023    CHOLHDL 29.7 09/29/2022         Component Value Date/Time     02/03/2024 0805    K 4.1 02/03/2024 0805     02/03/2024 0805    CO2 26 02/03/2024 0805    BUN 11 02/03/2024 0805    CREATININE 0.8 02/03/2024 0805     (H) 02/03/2024 0805    CALCIUM 9.3 02/03/2024 0805    ALKPHOS 94 02/03/2024 0805    AST 14 02/03/2024 0805    ALT 12 02/03/2024 0805    BILITOT 0.5 02/03/2024 0805    EGFRNORACEVR >60 02/03/2024 0805    ESTGFRAFRICA >60 03/11/2021 1108         Lab Results   Component Value Date    LABMICR 5.0 10/17/2023    CREATRANDUR 154.0 10/17/2023    MICALBCREAT 3.2 10/17/2023             ASSESSMENT and PLAN:    A1C GOAL: < 7 %      1. Type 2 diabetes mellitus without complication, without long-term current use of insulin  Reviewed glycemic goals.   Schedule yearly eye exam.   Change from Trulicity 4.5 mg to Mounjaro 10 mg weekly.   Potential side effects: nausea, diarrhea, constipation, bloating. Nausea for the first week or two is common.  Avoid big food portions and greasy/heavy foods.     Continue metformin.   Resume monitoring glucoses - 1x/day before or 2 hours after meals.     Return to clinic in 3 months with labs prior.     tirzepatide 10 mg/0.5 mL PnIj    blood-glucose meter kit    Hemoglobin A1C      2. Hyperlipidemia, unspecified hyperlipidemia type  rosuvastatin (CRESTOR) 20 MG tablet      3. Non morbid obesity, unspecified obesity type  tirzepatide 10 mg/0.5 mL PnIj              Orders Placed This Encounter   Procedures    Hemoglobin A1C     Standing Status:   Future     Standing Expiration Date:   4/8/2025          Follow up in about 3 months (around 5/8/2024).

## 2024-02-08 NOTE — PATIENT INSTRUCTIONS
A1C goal: <7%  Fasting/premeal blood glucose goal:   2 hour post-meal blood glucose goal: less than 180      Schedule yearly eye exam.   Change from Trulicity 4.5 mg to Mounjaro 10 mg weekly.   Potential side effects: nausea, diarrhea, constipation, bloating. Nausea for the first week or two is common.  Avoid big food portions and greasy/heavy foods.     Continue metformin.   Resume monitoring glucoses - 1x/day before or 2 hours after meals.     Return to clinic in 3 months with labs prior.

## 2024-02-20 ENCOUNTER — TELEPHONE (OUTPATIENT)
Dept: PHARMACY | Facility: CLINIC | Age: 64
End: 2024-02-20
Payer: COMMERCIAL

## 2024-03-06 ENCOUNTER — OFFICE VISIT (OUTPATIENT)
Dept: FAMILY MEDICINE | Facility: CLINIC | Age: 64
End: 2024-03-06
Payer: COMMERCIAL

## 2024-03-06 VITALS
RESPIRATION RATE: 20 BRPM | WEIGHT: 168.88 LBS | SYSTOLIC BLOOD PRESSURE: 136 MMHG | BODY MASS INDEX: 34.04 KG/M2 | DIASTOLIC BLOOD PRESSURE: 75 MMHG | TEMPERATURE: 99 F | HEART RATE: 95 BPM | HEIGHT: 59 IN | OXYGEN SATURATION: 98 %
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
## 2024-03-06 DIAGNOSIS — M79.604 RIGHT LEG PAIN: ICD-10-CM

## 2024-03-06 PROCEDURE — 3008F BODY MASS INDEX DOCD: CPT | Mod: CPTII,S$GLB,, | Performed by: FAMILY MEDICINE

## 2024-03-06 PROCEDURE — 3075F SYST BP GE 130 - 139MM HG: CPT | Mod: CPTII,S$GLB,, | Performed by: FAMILY MEDICINE

## 2024-03-06 PROCEDURE — 1159F MED LIST DOCD IN RCRD: CPT | Mod: CPTII,S$GLB,, | Performed by: FAMILY MEDICINE

## 2024-03-06 PROCEDURE — 99215 OFFICE O/P EST HI 40 MIN: CPT | Mod: 25,S$GLB,, | Performed by: FAMILY MEDICINE

## 2024-03-06 PROCEDURE — 3078F DIAST BP <80 MM HG: CPT | Mod: CPTII,S$GLB,, | Performed by: FAMILY MEDICINE

## 2024-03-06 PROCEDURE — 4010F ACE/ARB THERAPY RXD/TAKEN: CPT | Mod: CPTII,S$GLB,, | Performed by: FAMILY MEDICINE

## 2024-03-06 PROCEDURE — 3051F HG A1C>EQUAL 7.0%<8.0%: CPT | Mod: CPTII,S$GLB,, | Performed by: FAMILY MEDICINE

## 2024-03-06 PROCEDURE — 96372 THER/PROPH/DIAG INJ SC/IM: CPT | Mod: S$GLB,,, | Performed by: FAMILY MEDICINE

## 2024-03-06 PROCEDURE — 99999 PR PBB SHADOW E&M-EST. PATIENT-LVL IV: CPT | Mod: PBBFAC,,, | Performed by: FAMILY MEDICINE

## 2024-03-06 RX ORDER — KETOROLAC TROMETHAMINE 30 MG/ML
30 INJECTION, SOLUTION INTRAMUSCULAR; INTRAVENOUS
Status: COMPLETED | OUTPATIENT
Start: 2024-03-06 | End: 2024-03-06

## 2024-03-06 RX ORDER — IBUPROFEN 800 MG/1
800 TABLET ORAL EVERY 8 HOURS PRN
COMMUNITY
Start: 2024-03-05 | End: 2024-03-06 | Stop reason: SDUPTHER

## 2024-03-06 RX ORDER — CYCLOBENZAPRINE HCL 10 MG
5 TABLET ORAL
COMMUNITY
Start: 2024-03-05 | End: 2024-03-15

## 2024-03-06 RX ORDER — IBUPROFEN 800 MG/1
800 TABLET ORAL EVERY 8 HOURS PRN
Qty: 45 TABLET | Refills: 2 | Status: SHIPPED | OUTPATIENT
Start: 2024-03-06 | End: 2024-04-20

## 2024-03-06 RX ORDER — TRAMADOL HYDROCHLORIDE 50 MG/1
50 TABLET ORAL EVERY 8 HOURS PRN
Qty: 30 TABLET | Refills: 0 | Status: SHIPPED | OUTPATIENT
Start: 2024-03-06

## 2024-03-06 RX ADMIN — KETOROLAC TROMETHAMINE 30 MG: 30 INJECTION, SOLUTION INTRAMUSCULAR; INTRAVENOUS at 03:03

## 2024-03-06 NOTE — PROGRESS NOTES
Subjective:       Patient ID: Joanie Chandler is a 63 y.o. female.    Chief Complaint: No chief complaint on file.      HPI  62 yo female presents for fall. States she slipped and fell because it was wet after rain. Happened yesterday. Went [facility] and xr were nega for fracture. Continues to have pain and feels the meds that were given were not helpful.    Review of Systems   Constitutional: Negative.    HENT: Negative.     Respiratory: Negative.     Cardiovascular: Negative.    Gastrointestinal: Negative.    Endocrine: Negative.    Genitourinary: Negative.    Musculoskeletal:  Positive for myalgias.   Neurological: Negative.    Psychiatric/Behavioral: Negative.            Past Medical History:   Diagnosis Date    Allergy     Arthritis     Blood clot in vein     Diabetes mellitus     Diabetes mellitus, type 2     Hyperlipidemia     Hypertension     Uncontrolled type 2 diabetes mellitus with hyperglycemia 2/14/2020     Past Surgical History:   Procedure Laterality Date    HYSTERECTOMY       Family History   Problem Relation Age of Onset    Diabetes Mother     Arthritis Mother     Breast cancer Sister     Cataracts Sister     Breast cancer Sister     Cataracts Maternal Uncle      Social History     Socioeconomic History    Marital status: Single   Tobacco Use    Smoking status: Never    Smokeless tobacco: Never   Substance and Sexual Activity    Alcohol use: No    Drug use: No       Current Outpatient Medications:     azelastine (ASTELIN) 137 mcg (0.1 %) nasal spray, 2 sprays (274 mcg total) by Nasal route 2 (two) times daily., Disp: 30 mL, Rfl: 11    blood sugar diagnostic Strp, Check blood glucose 2 times a day, Disp: 200 strip, Rfl: 3    blood-glucose meter kit, Test glucose 2x/day, Disp: 1 each, Rfl: 0    chlorhexidine (PERIDEX) 0.12 % solution, 15 mLs 2 (two) times daily., Disp: , Rfl:     cyclobenzaprine (FLEXERIL) 10 MG tablet, Take 5 mg by mouth., Disp: , Rfl:     diclofenac sodium (VOLTAREN ARTHRITIS PAIN) 1 % Gel,  "Apply 2 g topically 2 (two) times daily as needed (pain)., Disp: 100 each, Rfl: 1    fluticasone propionate (FLONASE) 50 mcg/actuation nasal spray, 1 spray (50 mcg total) by Each Nostril route once daily., Disp: 16 g, Rfl: 0    fluticasone propionate (FLONASE) 50 mcg/actuation nasal spray, 1 spray (50 mcg total) by Each Nostril route once daily., Disp: 16 mL, Rfl: 11    lancets Misc, Check blood glucose 2x/day, Disp: 200 each, Rfl: 3    lancing device Misc, One device, used to check blood glucose, ICD-10: E11.9, Disp: 1 each, Rfl: 0    levocetirizine (XYZAL) 5 MG tablet, Take 1 tablet (5 mg total) by mouth every evening., Disp: 30 tablet, Rfl: 11    metFORMIN (GLUCOPHAGE-XR) 500 MG ER 24hr tablet, TAKE 1 TABLET(500 MG) BY MOUTH TWICE DAILY WITH MEALS, Disp: 180 tablet, Rfl: 2    rosuvastatin (CRESTOR) 20 MG tablet, Take 1 tablet (20 mg total) by mouth once daily., Disp: 90 tablet, Rfl: 2    tirzepatide 10 mg/0.5 mL PnIj, Inject 10 mg into the skin every 7 days., Disp: 12 pen , Rfl: 0    valsartan (DIOVAN) 80 MG tablet, Take 1 tablet (80 mg total) by mouth once daily., Disp: 90 tablet, Rfl: 2    ibuprofen (ADVIL,MOTRIN) 800 MG tablet, Take 1 tablet (800 mg total) by mouth every 8 (eight) hours as needed for Pain., Disp: 45 tablet, Rfl: 2    traMADoL (ULTRAM) 50 mg tablet, Take 1 tablet (50 mg total) by mouth every 8 (eight) hours as needed for Pain., Disp: 30 tablet, Rfl: 0   Objective:      Vitals:    03/06/24 1536   BP: 136/75   BP Location: Left arm   Patient Position: Sitting   BP Method: Large (Automatic)   Pulse: 95   Resp: 20   Temp: 98.5 °F (36.9 °C)   TempSrc: Oral   SpO2: 98%   Weight: 76.6 kg (168 lb 14 oz)   Height: 4' 11" (1.499 m)       Physical Exam  Constitutional:       General: She is not in acute distress.     Appearance: She is not diaphoretic.   HENT:      Head: Normocephalic and atraumatic.   Eyes:      Conjunctiva/sclera: Conjunctivae normal.   Pulmonary:      Effort: Pulmonary effort is " normal.   Musculoskeletal:         General: Tenderness (r thigh TTP with swelling) present. Normal range of motion.      Cervical back: Neck supple.   Skin:     Findings: No rash.   Neurological:      Mental Status: She is alert and oriented to person, place, and time.   Psychiatric:         Behavior: Behavior normal.         Thought Content: Thought content normal.         Judgment: Judgment normal.            Assessment:       1. Fall, subsequent encounter    2. Right leg pain        Plan:       Fall, subsequent encounter  -     ibuprofen (ADVIL,MOTRIN) 800 MG tablet; Take 1 tablet (800 mg total) by mouth every 8 (eight) hours as needed for Pain.  Dispense: 45 tablet; Refill: 2  -     traMADoL (ULTRAM) 50 mg tablet; Take 1 tablet (50 mg total) by mouth every 8 (eight) hours as needed for Pain.  Dispense: 30 tablet; Refill: 0  -     ketorolac injection 30 mg    Right leg pain  -     ibuprofen (ADVIL,MOTRIN) 800 MG tablet; Take 1 tablet (800 mg total) by mouth every 8 (eight) hours as needed for Pain.  Dispense: 45 tablet; Refill: 2  -     traMADoL (ULTRAM) 50 mg tablet; Take 1 tablet (50 mg total) by mouth every 8 (eight) hours as needed for Pain.  Dispense: 30 tablet; Refill: 0  -     ketorolac injection 30 mg      If pain continues may need to see sports med.        Future Appointments   Date Time Provider Department Center   4/11/2024 11:30 AM Leni Castanon MD [facility] [place]   4/16/2024 10:15 AM [facility] OIC-CT2 500 LB LIMIT [facility]   5/4/2024  8:30 AM LAB, [facility] LAB [facility]   5/9/2024  8:30 AM Milady Hall NP [facility] ENDOCRN [facility]       Patient note was created using [employer].  Any errors in syntax or even information may not have been identified and edited on initial review prior to signing this note.

## 2024-03-06 NOTE — LETTER
March 6, 2024      [facility]  [address] [place] [address]  Phone: [phone]  Fax: [phone]       Patient: Joanie Chandler   YOB: 1960  Date of Visit: 03/06/2024    To Whom It May Concern:    Robi Chandler  was at [facility] on 03/06/2024. The patient may return to work/school on 3/13/2024. If you have any questions or concerns, or if I can be of further assistance, please do not hesitate to contact me.    Sincerely,        Ketan Ramos MD

## 2024-03-18 ENCOUNTER — PATIENT OUTREACH (OUTPATIENT)
Dept: ADMINISTRATIVE | Facility: HOSPITAL | Age: 64
End: 2024-03-18
Payer: COMMERCIAL

## 2024-03-18 NOTE — PROGRESS NOTES
Population Health Chart Review & Patient Outreach Details      Additional Pop Health Notes:    Due for overdue HM below , need to get recent records if already done. If not done, orders/referrals need to be place and schedule. Please forward messages to me.          Colon Cancer Screening  Eye Exam                     Updates Requested / Reviewed:      Updated Care Coordination Note and Care Everywhere         Health Maintenance Topics Overdue:      [facility] Score: 2     Colon Cancer Screening  Eye Exam    Influenza Vaccine  Tetanus Vaccine  Shingles/Zoster Vaccine  RSV Vaccine                  Health Maintenance Topic(s) Outreach Outcomes & Actions Taken:    Colorectal Cancer Screening - Outreach Outcomes & Actions Taken  : Pt Declined Completing Colorectal Cancer Screening and stated will call when ready.    Eye Exam - Outreach Outcomes & Actions Taken  : unable to schedule; place patient on list to call once schedule open up.

## 2024-04-03 DIAGNOSIS — E11.9 TYPE 2 DIABETES MELLITUS WITHOUT COMPLICATION, UNSPECIFIED WHETHER LONG TERM INSULIN USE: ICD-10-CM

## 2024-04-03 DIAGNOSIS — E11.9 TYPE 2 DIABETES MELLITUS WITHOUT COMPLICATION, WITHOUT LONG-TERM CURRENT USE OF INSULIN: Primary | ICD-10-CM

## 2024-04-03 NOTE — TELEPHONE ENCOUNTER
----- Message from Erickson Rowell sent at 4/3/2024 10:59 AM CDT -----  Type: Patient Call Back    Who called:Self    What is the request in detail:in regards to medication    Can the clinic reply by [facility]?No    Would the patient rather a call back or a response via [facility]? Call    Best call back number:[phone] (home)       Additional Information:

## 2024-04-03 NOTE — TELEPHONE ENCOUNTER
"Patient states she does not like mounjaro, she has been having nausea and "upset stomach" since beginning mounjaro. She would like to get back on trulicity  "

## 2024-04-04 RX ORDER — DULAGLUTIDE 4.5 MG/.5ML
4.5 INJECTION, SOLUTION SUBCUTANEOUS
Qty: 4 PEN | Refills: 1 | Status: SHIPPED | OUTPATIENT
Start: 2024-04-04 | End: 2024-04-12 | Stop reason: SDUPTHER

## 2024-04-05 ENCOUNTER — TELEPHONE (OUTPATIENT)
Dept: ENDOCRINOLOGY | Facility: CLINIC | Age: 64
End: 2024-04-05
Payer: COMMERCIAL

## 2024-04-05 NOTE — TELEPHONE ENCOUNTER
----- Message from Yue Gordillo sent at 4/5/2024 11:10 AM CDT -----  Regarding: Self [phone]  Type: Patient Call Back     Who called: Self     What is the request in detail: Pt is requesting to be put back on dulaglutide (TRULICITY) 4.5 mg/0.5 mL pen injector because the new medication is too strong it makes her feel bad.     Can the clinic reply by [facility]? No     Would the patient rather a call back or a response via [facility]? Call back     Best call back number: [phone]      [facility] [address]  [address]   [place] [address]  Phone: [phone] Fax: [phone]       Additional Information:

## 2024-04-12 DIAGNOSIS — E11.9 TYPE 2 DIABETES MELLITUS WITHOUT COMPLICATION, WITHOUT LONG-TERM CURRENT USE OF INSULIN: ICD-10-CM

## 2024-04-12 RX ORDER — DULAGLUTIDE 4.5 MG/.5ML
4.5 INJECTION, SOLUTION SUBCUTANEOUS
Qty: 4 PEN | Refills: 1 | Status: SHIPPED | OUTPATIENT
Start: 2024-04-12 | End: 2024-06-04 | Stop reason: RX

## 2024-04-12 NOTE — TELEPHONE ENCOUNTER
----- Message from Coleen Ewing sent at 4/12/2024 10:44 AM CDT -----  .Type: RX Refill Request    Who Called: Self     Have you contacted your pharmacy: Yes     Refill or New Rx: Refill     RX Name and Strength:dulaglutide (TRULICITY) 4.5 mg/0.5 mL pen injector    Preferred Pharmacy with phone number:.  [facility] [address] [address]  Phone: [phone] Fax: [phone]      Local or Mail Order: Local    Ordering Provider: Isabel    Would the patient rather a call back or a response via [facility]? Call Back     Best Call Back Number:.[phone] (home)       Additional Information: [facility] is out of stock, don't know when they will be receiving any

## 2024-05-04 ENCOUNTER — LAB VISIT (OUTPATIENT)
Dept: LAB | Facility: HOSPITAL | Age: 64
End: 2024-05-04
Attending: NURSE PRACTITIONER
Payer: COMMERCIAL

## 2024-05-04 DIAGNOSIS — E11.9 TYPE 2 DIABETES MELLITUS WITHOUT COMPLICATION, WITHOUT LONG-TERM CURRENT USE OF INSULIN: ICD-10-CM

## 2024-05-04 PROCEDURE — 83036 HEMOGLOBIN GLYCOSYLATED A1C: CPT | Performed by: NURSE PRACTITIONER

## 2024-05-04 PROCEDURE — 36415 COLL VENOUS BLD VENIPUNCTURE: CPT | Performed by: NURSE PRACTITIONER

## 2024-05-05 LAB
ESTIMATED AVG GLUCOSE: 174 MG/DL (ref 68–131)
HBA1C MFR BLD: 7.7 % (ref 4–5.6)

## 2024-05-23 ENCOUNTER — PATIENT OUTREACH (OUTPATIENT)
Dept: ADMINISTRATIVE | Facility: HOSPITAL | Age: 64
End: 2024-05-23
Payer: COMMERCIAL

## 2024-05-23 NOTE — PROGRESS NOTES
Population Health Chart Review & Patient Outreach Details      Additional [facility] Health Notes:      Due for eye exam - [facility] but [facility] [employer] Place on list to call once see available opening.         Updates Requested / Reviewed:      Updated Care Coordination Note and Care Everywhere         Health Maintenance Topics Overdue:      [facility] Score: 2     Colon Cancer Screening  Eye Exam    Tetanus Vaccine  Shingles/Zoster Vaccine  RSV Vaccine                  Health Maintenance Topic(s) Outreach Outcomes & Actions Taken:    Eye Exam - Outreach Outcomes & Actions Taken  : Due for eye exam - [facility] to [facility] but [facility] [employer]. Place on list to call once see available opening.

## 2024-06-04 ENCOUNTER — TELEPHONE (OUTPATIENT)
Dept: ENDOCRINOLOGY | Facility: CLINIC | Age: 64
End: 2024-06-04
Payer: COMMERCIAL

## 2024-06-04 RX ORDER — SEMAGLUTIDE 2.68 MG/ML
2 INJECTION, SOLUTION SUBCUTANEOUS
Qty: 3 ML | Refills: 2 | Status: SHIPPED | OUTPATIENT
Start: 2024-06-04 | End: 2025-06-04

## 2024-06-04 NOTE — TELEPHONE ENCOUNTER
Patient states she has not been able to get trulicity for a long time. She did not know an extact time that she has been without the medication

## 2024-06-04 NOTE — TELEPHONE ENCOUNTER
----- Message from Esdras Pascual Urbano sent at 6/4/2024 11:40 AM CDT -----  Regarding: Joanie  Type: Patient Callback     Who called: Joanie     What is the request in detail: Pt stated that the pharmacy does not have her medication dulaglutide (TRULICITY) 4.5 mg/0.5 mL pen injector in stock. Pt has been off the medication for a week and she needs help getting it filled. Please reach out to the patient.     Can the clinic reply by [facility]? Yes     Would the patient rather a call back or a response via [facility]? Callback     Best call back number: [phone]      Additional Information:

## 2024-06-04 NOTE — TELEPHONE ENCOUNTER
[facility], advised how to use pen and administer medication, verbalized understanding

## 2024-06-18 ENCOUNTER — PATIENT OUTREACH (OUTPATIENT)
Dept: ADMINISTRATIVE | Facility: HOSPITAL | Age: 64
End: 2024-06-18
Payer: COMMERCIAL

## 2024-06-18 NOTE — PROGRESS NOTES
Population Health Chart Review & Patient Outreach Details      Additional [facility] Health Notes:      DUE FOR EYE EXAM AND COLON SCREENING. IF ALREADY DONE, NEED TO GET RECORDS INFORMATION.  NEED TO OFFER EYE CAM AT VISIT IF INTERESTED.  -- PREVIOUS ENCOUNTER PLACE ON WAIT LIST.  UNABLE TO LM - SENT PORTAL MESSAGE          Updates Requested / Reviewed:      Updated Care Coordination Note, Care Everywhere, and Care Team Updated         Health Maintenance Topics Overdue:      [facility] Score: 2     Colon Cancer Screening  Eye Exam    Tetanus Vaccine  Shingles/Zoster Vaccine  RSV Vaccine                  Health Maintenance Topic(s) Outreach Outcomes & Actions Taken:    Eye Exam - Outreach Outcomes & Actions Taken  : UNABLE TO LM - SENT PORTAL MESSAGE    Colorectal Cancer Screening - Outreach Outcomes & Actions Taken  : UNABLE TO LM - SENT PORTAL MESSAGE

## 2024-07-08 ENCOUNTER — PATIENT OUTREACH (OUTPATIENT)
Dept: ADMINISTRATIVE | Facility: HOSPITAL | Age: 64
End: 2024-07-08
Payer: COMMERCIAL

## 2024-07-08 DIAGNOSIS — E11.9 TYPE 2 DIABETES MELLITUS WITHOUT COMPLICATION, UNSPECIFIED WHETHER LONG TERM INSULIN USE: Primary | ICD-10-CM

## 2024-07-08 NOTE — PROGRESS NOTES
Population Health Chart Review & Patient Outreach Details      Additional [facility] Health Notes:      [facility] NEED DUE FOR EYE EXAM AND COLON SCREENING. IF ALREADY DONE, NEED TO GET RECORDS INFORMATION.  NEED TO OFFER EYE CAM AT VISIT IF INTERESTED.  Unable to leave message. Sent portal message.          Updates Requested / Reviewed:      Updated Care Coordination Note and Care Everywhere         Health Maintenance Topics Overdue:      [facility] Score: 2     Colon Cancer Screening  Eye Exam    Tetanus Vaccine  Shingles/Zoster Vaccine  RSV Vaccine                  Health Maintenance Topic(s) Outreach Outcomes & Actions Taken:    Eye Exam - Outreach Outcomes & Actions Taken  : Unable to leave message. Sent portal message.     Colorectal Cancer Screening - Outreach Outcomes & Actions Taken  : Unable to leave message. Sent portal message.

## 2024-07-08 NOTE — PROGRESS NOTES
Population Health Chart Review & Patient Outreach Details      Additional [facility] Health Notes:    COMM. CARE GAP NEED DUE FOR EYE EXAM AND COLON SCREENING.-- EYE VISIT SCHEDULED WITH DR. POTTER. PATIENT SCHEDULE VISIT WITH DR. AYALA AND WILL CONSULT ABOUT COLON SCREENING DURING VISIT.           Updates Requested / Reviewed:      Updated Care Coordination Note and Care Everywhere         Health Maintenance Topics Overdue:      [facility] Score: 1     Colon Cancer Screening    Tetanus Vaccine  Shingles/Zoster Vaccine  RSV Vaccine                  Health Maintenance Topic(s) Outreach Outcomes & Actions Taken:    Eye Exam - Outreach Outcomes & Actions Taken  : Eye Camera Scheduled or Optometry/Ophthalmology Referral Placed/Appt Scheduled    Primary Care Appt - Outreach Outcomes & Actions Taken  : Primary Care Appt Scheduled    Colorectal Cancer Screening - Outreach Outcomes & Actions Taken  : WILL WAIT UNTIL VISIT TO CONSULT.

## 2024-08-02 NOTE — PROGRESS NOTES
Subjective:       Patient ID: Joanie Chandler is a pleasant 63 y.o. Black or  female patient    Chief Complaint: Follow-up      Patient is a patient I saw last on 09/18/2023, see my last notes.  Seen last by Dr. Ramos in March 2024.    HPI:     Patient with past medical history as per list of problems below coming today for a regular follow-up visit.    Most recent visit at  ([facility]) for right shoulder pain.  Reporting that she still struggles with R shoulder pain.  We already discussed this issue 1 year ago, I referred her to Orthopedics but she was NOS.  She reports taking her medications correctly, she is on Ozempic as per Endocrinology, missed f-up appointments. She is on Ozempic.  She had a CT of the lungs showing a nodule more than 1 year ago, she was referred to Pulmonology, nodule stated to be benign, follow up at 6 months but she did not go.  She reports being busy due to her working hard, she works as  at a Cancer Center.    Patient Active Problem List   Diagnosis    Type 2 diabetes mellitus with hyperglycemia, without long-term current use of insulin    Essential hypertension    Pain of both breasts    Heart murmur    Arthritis of ankle    BMI 40.0-44.9, adult    Vitamin D deficiency    Hyperlipidemia LDL goal <70    Vaginal candidiasis    Refractive error    Abnormal CT lung screening    Solitary pulmonary nodule    Seasonal allergic rhinitis due to pollen    Severe obesity (BMI 35.0-39.9) with comorbidity         PAST MEDICAL HISTORY  Past Medical History:   Diagnosis Date    Allergy     Arthritis     Blood clot in vein     Diabetes mellitus     Diabetes mellitus, type 2     Hyperlipidemia     Hypertension     Uncontrolled type 2 diabetes mellitus with hyperglycemia 2/14/2020        PAST SURGICAL HISTORY:  Past Surgical History:   Procedure Laterality Date    HYSTERECTOMY          SOCIAL HISTORY:   reports that she has never smoked. She has never used smokeless tobacco. She  reports that she does not drink alcohol and does not use drugs.     FAMILY HISTORY:  Family History   Problem Relation Name Age of Onset    Diabetes Mother      Arthritis Mother      Breast cancer Sister      Cataracts Sister      Breast cancer Sister      Cataracts Maternal Uncle          ALLERGIES:   Review of patient's allergies indicates:  No Known Allergies    MEDICATIONS:    Current Outpatient Medications:     azelastine (ASTELIN) 137 mcg (0.1 %) nasal spray, 2 sprays (274 mcg total) by Nasal route 2 (two) times daily., Disp: 30 mL, Rfl: 11    blood sugar diagnostic Strp, Check blood glucose 2 times a day, Disp: 200 strip, Rfl: 3    blood-glucose meter kit, Test glucose 2x/day, Disp: 1 each, Rfl: 0    chlorhexidine (PERIDEX) 0.12 % solution, 15 mLs 2 (two) times daily., Disp: , Rfl:     diclofenac sodium (VOLTAREN ARTHRITIS PAIN) 1 % Gel, Apply 2 g topically 2 (two) times daily as needed (pain)., Disp: 100 each, Rfl: 1    fluticasone propionate (FLONASE) 50 mcg/actuation nasal spray, 1 spray (50 mcg total) by Each Nostril route once daily., Disp: 16 g, Rfl: 0    fluticasone propionate (FLONASE) 50 mcg/actuation nasal spray, 1 spray (50 mcg total) by Each Nostril route once daily., Disp: 16 mL, Rfl: 11    lancets Misc, Check blood glucose 2x/day, Disp: 200 each, Rfl: 3    lancing device Misc, One device, used to check blood glucose, ICD-10: E11.9, Disp: 1 each, Rfl: 0    rosuvastatin (CRESTOR) 20 MG tablet, Take 1 tablet (20 mg total) by mouth once daily., Disp: 90 tablet, Rfl: 2    semaglutide (OZEMPIC) 2 mg/dose (8 mg/3 mL) PnIj, Inject 2 mg into the skin every 7 days., Disp: 3 mL, Rfl: 2    levocetirizine (XYZAL) 5 MG tablet, Take 1 tablet (5 mg total) by mouth every evening., Disp: 30 tablet, Rfl: 11    metFORMIN (GLUCOPHAGE-XR) 500 MG ER 24hr tablet, Take 1 tablet (500 mg total) by mouth 2 (two) times daily with meals., Disp: 180 tablet, Rfl: 2    valsartan (DIOVAN) 80 MG tablet, Take 1 tablet (80 mg  "total) by mouth once daily., Disp: 90 tablet, Rfl: 2    Review of Systems   Constitutional: Negative.    HENT: Negative.     Respiratory: Negative.     Cardiovascular: Negative.    Gastrointestinal: Negative.    Endocrine: Negative.    Genitourinary: Negative.    Musculoskeletal:  Positive for arthralgias (R shoulder). Negative for myalgias.   Neurological: Negative.    Psychiatric/Behavioral: Negative.         Objective:      Physical Exam  Vitals reviewed.   Constitutional:       General: She is not in acute distress.     Appearance: Normal appearance. She is not diaphoretic.   HENT:      Head: Normocephalic and atraumatic.      Right Ear: Tympanic membrane normal. There is no impacted cerumen.      Left Ear: Tympanic membrane normal. There is no impacted cerumen.   Eyes:      Conjunctiva/sclera: Conjunctivae normal.   Cardiovascular:      Rate and Rhythm: Normal rate and regular rhythm.      Pulses: Normal pulses.      Heart sounds: Normal heart sounds.   Pulmonary:      Effort: Pulmonary effort is normal.      Breath sounds: Normal breath sounds.   Abdominal:      General: Bowel sounds are normal.   Musculoskeletal:         General: Tenderness (R shoulder with decreased ROM) present.      Cervical back: Neck supple.   Skin:     Findings: No rash.   Neurological:      Mental Status: She is alert and oriented to person, place, and time.   Psychiatric:         Behavior: Behavior normal.         Thought Content: Thought content normal.         Judgment: Judgment normal.         Vitals:    08/03/24 0823   BP: 138/78   BP Location: Right arm   Patient Position: Sitting   BP Method: Large (Manual)   Pulse: 71   Resp: 16   Temp: 98 °F (36.7 °C)   TempSrc: Oral   SpO2: 98%   Weight: 76.5 kg (168 lb 10.4 oz)   Height: 4' 11" (1.499 m)     Body mass index is 34.06 kg/m².    RESULTS: Reviewed labs from last 12 months    Last Lab Results:     Lab Results   Component Value Date    WBC 8.33 09/29/2022    HGB 14.2 09/29/2022    " HCT 45.9 2022     2022     2024    K 4.1 2024     2024    CO2 26 2024    BUN 11 2024    CREATININE 0.8 2024    CALCIUM 9.3 2024    ALBUMIN 3.4 (L) 2024    AST 14 2024    ALT 12 2024    CHOL 140 2024    TRIG 47 2024    HDL 55 2024    LDLCALC 75.6 2024    HGBA1C 7.7 (H) 2024    TSH 0.741 2021         Assessment & Plan:       1. Essential hypertension  -     valsartan (DIOVAN) 80 MG tablet; Take 1 tablet (80 mg total) by mouth once daily.  Dispense: 90 tablet; Refill: 2  -     COMPREHENSIVE METABOLIC PANEL; Future; Expected date: 2024  -     CBC W/ AUTO DIFFERENTIAL; Future; Expected date: 2024    BP at goal, same treatment, blood work and refills.    2. Type 2 diabetes mellitus without complication, without long-term current use of insulin  -     metFORMIN (GLUCOPHAGE-XR) 500 MG ER 24hr tablet; Take 1 tablet (500 mg total) by mouth 2 (two) times daily with meals.  Dispense: 180 tablet; Refill: 2  -     HEMOGLOBIN A1C; Future; Expected date: 2024  -     Ambulatory referral/consult to Endocrinology; Future; Expected date: 08/10/2024    Patient on Ozempic as per Endocrinology, did not go up, will do blood work, refill provided for metformin, referral to Endocrinology placed.    3. Class 1 obesity due to excess calories with serious comorbidity and body mass index (BMI) of 34.0 to 34.9 in adult    We discussed weight issues and safe, effective ways of losing pounds, includin) diet:  low carbohydrate, low fat diet, stay away from fast food, fried and processed food, use whole grain, lot of fruits and vegetables, use healthy fat such as avocado, nuts and olive oil in reasonable quantity, stay away from sodas. Regular meals with lean proteins.  2) physical activity: ideally 150 min a week, with cardiovascular and resistance activity.  Patient was encouraged to set realistic  attainable goals for weight loss, and we will follow up periodically.  On Ozempic, weight is stable, no side effects.    4. Chronic right shoulder pain  -     Ambulatory referral/consult to Orthopedics; Future; Expected date: 08/10/2024    For more than 1 year, did not follow-up in Orthopedics, urged her not to miss her next appointment, referral placed.    5. Abnormal CT lung screening  -     CT Chest Without Contrast; Future; Expected date: 08/03/2024    See HPI and notes from Pulmonology, will order a new CT she was supposed to do 6 months ago.    Other orders  -     levocetirizine (XYZAL) 5 MG tablet; Take 1 tablet (5 mg total) by mouth every evening.  Dispense: 30 tablet; Refill: 11       No follow-ups on file.    This note was created by combination of typed  and M-Modal dictation.  Transcription errors may be present.  If there are any questions, please contact me.

## 2024-08-02 NOTE — PROGRESS NOTES
Health Maintenance Due   Topic     TETANUS VACCINE  CONSULT WITH PCP    Colorectal Cancer Screening  CONSULT WITH PCP    Shingles Vaccine (1 of 2) CONSULT WITH PCP    RSV Vaccine (Age 60+ and Pregnant patients) (1 - 1-dose 60+ series) Not offered at this facility.    Eye Exam  CONSULT WITH PCP    COVID-19 Vaccine (4 - 2023-24 season) Not offered at this facility.    Mammogram  CONSULT WITH PCP

## 2024-08-03 ENCOUNTER — OFFICE VISIT (OUTPATIENT)
Dept: FAMILY MEDICINE | Facility: CLINIC | Age: 64
End: 2024-08-03
Payer: COMMERCIAL

## 2024-08-03 VITALS
WEIGHT: 168.63 LBS | DIASTOLIC BLOOD PRESSURE: 78 MMHG | SYSTOLIC BLOOD PRESSURE: 138 MMHG | HEIGHT: 59 IN | BODY MASS INDEX: 34 KG/M2 | HEART RATE: 71 BPM | RESPIRATION RATE: 16 BRPM | TEMPERATURE: 98 F | OXYGEN SATURATION: 98 %

## 2024-08-03 DIAGNOSIS — R91.8 ABNORMAL CT LUNG SCREENING: ICD-10-CM

## 2024-08-03 DIAGNOSIS — E11.9 TYPE 2 DIABETES MELLITUS WITHOUT COMPLICATION, WITHOUT LONG-TERM CURRENT USE OF INSULIN: ICD-10-CM

## 2024-08-03 DIAGNOSIS — G89.29 CHRONIC RIGHT SHOULDER PAIN: ICD-10-CM

## 2024-08-03 DIAGNOSIS — E66.09 CLASS 1 OBESITY DUE TO EXCESS CALORIES WITH SERIOUS COMORBIDITY AND BODY MASS INDEX (BMI) OF 34.0 TO 34.9 IN ADULT: ICD-10-CM

## 2024-08-03 DIAGNOSIS — I10 ESSENTIAL HYPERTENSION: Primary | ICD-10-CM

## 2024-08-03 DIAGNOSIS — M25.511 CHRONIC RIGHT SHOULDER PAIN: ICD-10-CM

## 2024-08-03 PROBLEM — E66.01 SEVERE OBESITY (BMI 35.0-39.9) WITH COMORBIDITY: Status: ACTIVE | Noted: 2024-08-03

## 2024-08-03 PROCEDURE — 4010F ACE/ARB THERAPY RXD/TAKEN: CPT | Mod: CPTII,S$GLB,, | Performed by: INTERNAL MEDICINE

## 2024-08-03 PROCEDURE — 1160F RVW MEDS BY RX/DR IN RCRD: CPT | Mod: CPTII,S$GLB,, | Performed by: INTERNAL MEDICINE

## 2024-08-03 PROCEDURE — 3078F DIAST BP <80 MM HG: CPT | Mod: CPTII,S$GLB,, | Performed by: INTERNAL MEDICINE

## 2024-08-03 PROCEDURE — 99999 PR PBB SHADOW E&M-EST. PATIENT-LVL V: CPT | Mod: PBBFAC,,, | Performed by: INTERNAL MEDICINE

## 2024-08-03 PROCEDURE — 99214 OFFICE O/P EST MOD 30 MIN: CPT | Mod: S$GLB,,, | Performed by: INTERNAL MEDICINE

## 2024-08-03 PROCEDURE — 1159F MED LIST DOCD IN RCRD: CPT | Mod: CPTII,S$GLB,, | Performed by: INTERNAL MEDICINE

## 2024-08-03 PROCEDURE — 3008F BODY MASS INDEX DOCD: CPT | Mod: CPTII,S$GLB,, | Performed by: INTERNAL MEDICINE

## 2024-08-03 PROCEDURE — 3075F SYST BP GE 130 - 139MM HG: CPT | Mod: CPTII,S$GLB,, | Performed by: INTERNAL MEDICINE

## 2024-08-03 PROCEDURE — 3051F HG A1C>EQUAL 7.0%<8.0%: CPT | Mod: CPTII,S$GLB,, | Performed by: INTERNAL MEDICINE

## 2024-08-03 RX ORDER — LEVOCETIRIZINE DIHYDROCHLORIDE 5 MG/1
5 TABLET, FILM COATED ORAL NIGHTLY
Qty: 30 TABLET | Refills: 11 | Status: SHIPPED | OUTPATIENT
Start: 2024-08-03 | End: 2025-08-03

## 2024-08-03 RX ORDER — METFORMIN HYDROCHLORIDE 500 MG/1
500 TABLET, EXTENDED RELEASE ORAL 2 TIMES DAILY WITH MEALS
Qty: 180 TABLET | Refills: 2 | Status: SHIPPED | OUTPATIENT
Start: 2024-08-03

## 2024-08-03 RX ORDER — VALSARTAN 80 MG/1
80 TABLET ORAL DAILY
Qty: 90 TABLET | Refills: 2 | Status: SHIPPED | OUTPATIENT
Start: 2024-08-03

## 2024-08-07 ENCOUNTER — LAB VISIT (OUTPATIENT)
Dept: LAB | Facility: HOSPITAL | Age: 64
End: 2024-08-07
Attending: INTERNAL MEDICINE
Payer: COMMERCIAL

## 2024-08-07 DIAGNOSIS — I10 ESSENTIAL HYPERTENSION: ICD-10-CM

## 2024-08-07 DIAGNOSIS — E11.9 TYPE 2 DIABETES MELLITUS WITHOUT COMPLICATION, WITHOUT LONG-TERM CURRENT USE OF INSULIN: ICD-10-CM

## 2024-08-07 LAB
ALBUMIN SERPL BCP-MCNC: 3.6 G/DL (ref 3.5–5.2)
ALP SERPL-CCNC: 90 U/L (ref 55–135)
ALT SERPL W/O P-5'-P-CCNC: 11 U/L (ref 10–44)
ANION GAP SERPL CALC-SCNC: 7 MMOL/L (ref 8–16)
AST SERPL-CCNC: 16 U/L (ref 10–40)
BASOPHILS # BLD AUTO: 0.03 K/UL (ref 0–0.2)
BASOPHILS NFR BLD: 0.5 % (ref 0–1.9)
BILIRUB SERPL-MCNC: 0.4 MG/DL (ref 0.1–1)
BUN SERPL-MCNC: 11 MG/DL (ref 8–23)
CALCIUM SERPL-MCNC: 10.2 MG/DL (ref 8.7–10.5)
CHLORIDE SERPL-SCNC: 107 MMOL/L (ref 95–110)
CO2 SERPL-SCNC: 26 MMOL/L (ref 23–29)
CREAT SERPL-MCNC: 0.8 MG/DL (ref 0.5–1.4)
DIFFERENTIAL METHOD BLD: ABNORMAL
EOSINOPHIL # BLD AUTO: 0.1 K/UL (ref 0–0.5)
EOSINOPHIL NFR BLD: 0.8 % (ref 0–8)
ERYTHROCYTE [DISTWIDTH] IN BLOOD BY AUTOMATED COUNT: 12.7 % (ref 11.5–14.5)
EST. GFR  (NO RACE VARIABLE): >60 ML/MIN/1.73 M^2
ESTIMATED AVG GLUCOSE: 171 MG/DL (ref 68–131)
GLUCOSE SERPL-MCNC: 128 MG/DL (ref 70–110)
HBA1C MFR BLD: 7.6 % (ref 4–5.6)
HCT VFR BLD AUTO: 42 % (ref 37–48.5)
HGB BLD-MCNC: 12.7 G/DL (ref 12–16)
IMM GRANULOCYTES # BLD AUTO: 0.01 K/UL (ref 0–0.04)
IMM GRANULOCYTES NFR BLD AUTO: 0.2 % (ref 0–0.5)
LYMPHOCYTES # BLD AUTO: 1.8 K/UL (ref 1–4.8)
LYMPHOCYTES NFR BLD: 27.3 % (ref 18–48)
MCH RBC QN AUTO: 28.2 PG (ref 27–31)
MCHC RBC AUTO-ENTMCNC: 30.2 G/DL (ref 32–36)
MCV RBC AUTO: 93 FL (ref 82–98)
MONOCYTES # BLD AUTO: 0.6 K/UL (ref 0.3–1)
MONOCYTES NFR BLD: 8.5 % (ref 4–15)
NEUTROPHILS # BLD AUTO: 4.1 K/UL (ref 1.8–7.7)
NEUTROPHILS NFR BLD: 62.7 % (ref 38–73)
NRBC BLD-RTO: 0 /100 WBC
PLATELET # BLD AUTO: 251 K/UL (ref 150–450)
PMV BLD AUTO: 11.8 FL (ref 9.2–12.9)
POTASSIUM SERPL-SCNC: 4.1 MMOL/L (ref 3.5–5.1)
PROT SERPL-MCNC: 6.9 G/DL (ref 6–8.4)
RBC # BLD AUTO: 4.51 M/UL (ref 4–5.4)
SODIUM SERPL-SCNC: 140 MMOL/L (ref 136–145)
WBC # BLD AUTO: 6.49 K/UL (ref 3.9–12.7)

## 2024-08-07 PROCEDURE — 36415 COLL VENOUS BLD VENIPUNCTURE: CPT | Mod: PO | Performed by: INTERNAL MEDICINE

## 2024-08-07 PROCEDURE — 80053 COMPREHEN METABOLIC PANEL: CPT | Performed by: INTERNAL MEDICINE

## 2024-08-07 PROCEDURE — 83036 HEMOGLOBIN GLYCOSYLATED A1C: CPT | Performed by: INTERNAL MEDICINE

## 2024-08-07 PROCEDURE — 85025 COMPLETE CBC W/AUTO DIFF WBC: CPT | Performed by: INTERNAL MEDICINE

## 2024-08-09 ENCOUNTER — HOSPITAL ENCOUNTER (OUTPATIENT)
Dept: RADIOLOGY | Facility: HOSPITAL | Age: 64
Discharge: HOME | End: 2024-08-09
Attending: INTERNAL MEDICINE
Payer: COMMERCIAL

## 2024-08-09 DIAGNOSIS — R91.8 ABNORMAL CT LUNG SCREENING: ICD-10-CM

## 2024-08-09 PROCEDURE — 71250 CT THORAX DX C-: CPT | Mod: 26,,, | Performed by: STUDENT IN AN ORGANIZED HEALTH CARE EDUCATION/TRAINING PROGRAM

## 2024-08-09 PROCEDURE — 71250 CT THORAX DX C-: CPT | Mod: TC

## 2024-08-14 DIAGNOSIS — M25.511 RIGHT SHOULDER PAIN, UNSPECIFIED CHRONICITY: Primary | ICD-10-CM

## 2024-08-19 ENCOUNTER — OFFICE VISIT (OUTPATIENT)
Dept: ORTHOPEDICS | Facility: CLINIC | Age: 64
End: 2024-08-19
Payer: COMMERCIAL

## 2024-08-19 ENCOUNTER — APPOINTMENT (OUTPATIENT)
Dept: RADIOLOGY | Facility: HOSPITAL | Age: 64
End: 2024-08-19
Attending: ORTHOPAEDIC SURGERY
Payer: COMMERCIAL

## 2024-08-19 DIAGNOSIS — G89.29 CHRONIC RIGHT SHOULDER PAIN: Primary | ICD-10-CM

## 2024-08-19 DIAGNOSIS — M25.511 CHRONIC RIGHT SHOULDER PAIN: Primary | ICD-10-CM

## 2024-08-19 DIAGNOSIS — M25.511 RIGHT SHOULDER PAIN, UNSPECIFIED CHRONICITY: ICD-10-CM

## 2024-08-19 PROCEDURE — 99204 OFFICE O/P NEW MOD 45 MIN: CPT | Mod: 25,S$GLB,, | Performed by: ORTHOPAEDIC SURGERY

## 2024-08-19 PROCEDURE — 1159F MED LIST DOCD IN RCRD: CPT | Mod: CPTII,S$GLB,, | Performed by: ORTHOPAEDIC SURGERY

## 2024-08-19 PROCEDURE — 1160F RVW MEDS BY RX/DR IN RCRD: CPT | Mod: CPTII,S$GLB,, | Performed by: ORTHOPAEDIC SURGERY

## 2024-08-19 PROCEDURE — 99999 PR PBB SHADOW E&M-EST. PATIENT-LVL IV: CPT | Mod: PBBFAC,,, | Performed by: ORTHOPAEDIC SURGERY

## 2024-08-19 PROCEDURE — 4010F ACE/ARB THERAPY RXD/TAKEN: CPT | Mod: CPTII,S$GLB,, | Performed by: ORTHOPAEDIC SURGERY

## 2024-08-19 PROCEDURE — 20610 DRAIN/INJ JOINT/BURSA W/O US: CPT | Mod: RT,S$GLB,, | Performed by: ORTHOPAEDIC SURGERY

## 2024-08-19 PROCEDURE — 73030 X-RAY EXAM OF SHOULDER: CPT | Mod: TC,FY,PN,RT

## 2024-08-19 PROCEDURE — 3051F HG A1C>EQUAL 7.0%<8.0%: CPT | Mod: CPTII,S$GLB,, | Performed by: ORTHOPAEDIC SURGERY

## 2024-08-19 RX ORDER — NAPROXEN 500 MG/1
500 TABLET ORAL 2 TIMES DAILY
Qty: 60 TABLET | Refills: 0 | Status: SHIPPED | OUTPATIENT
Start: 2024-08-19 | End: 2024-08-19

## 2024-08-19 RX ORDER — NAPROXEN 500 MG/1
500 TABLET ORAL 2 TIMES DAILY
Qty: 60 TABLET | Refills: 0 | Status: SHIPPED | OUTPATIENT
Start: 2024-08-19

## 2024-08-19 RX ORDER — TRIAMCINOLONE ACETONIDE 40 MG/ML
40 INJECTION, SUSPENSION INTRA-ARTICULAR; INTRAMUSCULAR
Status: DISCONTINUED | OUTPATIENT
Start: 2024-08-19 | End: 2024-08-19 | Stop reason: HOSPADM

## 2024-08-19 RX ADMIN — TRIAMCINOLONE ACETONIDE 40 MG: 40 INJECTION, SUSPENSION INTRA-ARTICULAR; INTRAMUSCULAR at 09:08

## 2024-08-19 NOTE — PROGRESS NOTES
Assessment: 63 y.o. female with right cuff pathology     I explained my diagnostic impression and the reasoning behind it in detail, using layman's terms.  Models and/or pictures were used to help in the explanation.    Plan:   - PT referral to physio fit   - Injection of the right subacromial space  performed, please see procedure note for more details.  Prior to the injection risks and benefits of corticosteroid injection were discussed with the patient including pain, infection, bleeding, skin color changes, swelling, steroid flare. We discussed that over time injections can result in chondral damage, acceleration of arthritis formation, damage to tendons and damage to joints.  The patient consented for the procedure.  Post-injection instructions were given to the patient in writing. I discussed the risk of increased blood glucose following steroid injection in the setting of diabetes- the patient will monitor closely for the next 24 hours and call her primary care physician with any questions or concerns regarding blood glucose control   - naproxen 500 mg BID x 1 weeks then PRN. The patient was advised that NSAID-type medications have important potential side effects: gastrointestinal irritation, GI bleeding, cardiac effects and renal injuries. Take the medication with food and to stop and call the office for any GI upset, vomiting, abdominal pain or black/bloody stools. The patient expresses understanding of these issues and questions were answered.  - Return to clinic in 12 weeks. Return sooner if symptoms worsen or fail to improve.     All questions were answered in detail. The patient is in full agreement with the treatment plan and will proceed accordingly.    Chief Complaint   Patient presents with    Right Shoulder - Pain       Initial visit (8/19/24): Joanie Chandler is a 63 y.o. female who presents today complaining of right shoulder pain  Duration of symptoms:  about 2 months  Trauma or new activity:  no  Pain is intermittent  Aggravating factors: pain at night, reaching overhead, pain in the morning when she first gets up  Relieving factors: rest  Night pain is present and is disruptive to sleep  Radicular symptoms: no   Localizes pain to subdeltoid fossa    Associated symptoms:  limited range of motion.    Prior treatment:   topical creams and patches  and tylenol  without improvement in pain.   Took Aleve once without relief   Pain does interfere with sleep and activities of daily living .    This patient was seen in consultation at the request of Dr. Leni Castanon    This is the extent of the patient's complaints at this time.     Hand dominance: Right     Occupation:housekeeping     Review of patient's allergies indicates:  No Known Allergies      Physical Exam:   Vitals:    08/19/24 1001   PainSc: 10-Worst pain ever   PainLoc: Shoulder       General: Patient is alert, awake and oriented to time, place and person. Mood and affect are appropriate.  Patient does not appear to be in any distress, denies any constitutional symptoms and appears stated age.   HEENT: Pupils are equal and round, sclera are not injected. External examination of ears and nose reveals no abnormalities. Cranial nerves II-X are grossly intact  Skin: no rashes, abrasions or open wounds on the affected extremity   Resp: No respiratory distress or audible wheezing   CV: 2+  pulses, all extremities warm and well perfused   Right Shoulder    Shoulder Range of Motion    Right     Left   (Active/Passive)       Forward Elevation     150/160            165/165  External rotation (arm at side)  50/50             50/50   Internal rotation behind the back  L5             L5     Range of motion is painful     Acromioclavicular joint is tender  Crossbody test: negative    Neer's positive  Hawkin's positive    Braden's positive  Drop arm negative  Belly press negative      Cuff Strength     Right     Left   Supraspinatus         5/5    5/5  Infraspinatus     5/5    5/5  Subscapularis     5/5    5/5    Deltoid testing            5/5    5/5    Speeds positive  Yergasons negative    Elbow examination demonstrates no tenderness to palpation and has normal range of motion.     ltsi C5-T1  + epl, io, fds, fdp   2+ RP      Imaging: 3 views of the right shoulder:  positive for degenerative changes of the AC joint. The humeral head is well centered on the AP and axillary views.  There are cortical change of the greater consistent with chronic cuff pathology. There is not significant degenerative change of the glenohumeral joint or posterior subluxation of the humeral head. No acute changes or fracture.      I personally reviewed and interpreted the patient's imaging obtained today in clinic     A note notifying Dr. Leni Castanon of my findings was sent via the electronic medical record     This note was created by combination of typed  and M-Modal dictation. Transcription and phonetic errors may be present.  If there are any questions, please contact me.      Current Outpatient Medications:     azelastine (ASTELIN) 137 mcg (0.1 %) nasal spray, 2 sprays (274 mcg total) by Nasal route 2 (two) times daily., Disp: 30 mL, Rfl: 11    blood sugar diagnostic Strp, Check blood glucose 2 times a day, Disp: 200 strip, Rfl: 3    blood-glucose meter kit, Test glucose 2x/day, Disp: 1 each, Rfl: 0    chlorhexidine (PERIDEX) 0.12 % solution, 15 mLs 2 (two) times daily., Disp: , Rfl:     diclofenac sodium (VOLTAREN ARTHRITIS PAIN) 1 % Gel, Apply 2 g topically 2 (two) times daily as needed (pain)., Disp: 100 each, Rfl: 1    fluticasone propionate (FLONASE) 50 mcg/actuation nasal spray, 1 spray (50 mcg total) by Each Nostril route once daily., Disp: 16 g, Rfl: 0    fluticasone propionate (FLONASE) 50 mcg/actuation nasal spray, 1 spray (50 mcg total) by Each Nostril route once daily., Disp: 16 mL, Rfl: 11    lancets Misc, Check blood glucose 2x/day,  Disp: 200 each, Rfl: 3    lancing device Misc, One device, used to check blood glucose, ICD-10: E11.9, Disp: 1 each, Rfl: 0    levocetirizine (XYZAL) 5 MG tablet, Take 1 tablet (5 mg total) by mouth every evening., Disp: 30 tablet, Rfl: 11    metFORMIN (GLUCOPHAGE-XR) 500 MG ER 24hr tablet, Take 1 tablet (500 mg total) by mouth 2 (two) times daily with meals., Disp: 180 tablet, Rfl: 2    rosuvastatin (CRESTOR) 20 MG tablet, Take 1 tablet (20 mg total) by mouth once daily., Disp: 90 tablet, Rfl: 2    semaglutide (OZEMPIC) 2 mg/dose (8 mg/3 mL) PnIj, Inject 2 mg into the skin every 7 days., Disp: 3 mL, Rfl: 2    valsartan (DIOVAN) 80 MG tablet, Take 1 tablet (80 mg total) by mouth once daily., Disp: 90 tablet, Rfl: 2    Past Medical History:   Diagnosis Date    Allergy     Arthritis     Blood clot in vein     Diabetes mellitus     Diabetes mellitus, type 2     Hyperlipidemia     Hypertension     Uncontrolled type 2 diabetes mellitus with hyperglycemia 2/14/2020       Active Problem List with Overview Notes    Diagnosis Date Noted    Severe obesity (BMI 35.0-39.9) with comorbidity 08/03/2024    Abnormal CT lung screening 10/20/2023    Solitary pulmonary nodule 10/20/2023    Seasonal allergic rhinitis due to pollen 10/20/2023    Refractive error 12/10/2021    Vaginal candidiasis 04/22/2020    Hyperlipidemia LDL goal <70 02/21/2020    Type 2 diabetes mellitus with hyperglycemia, without long-term current use of insulin 02/14/2020    Essential hypertension 02/14/2020    Pain of both breasts 02/14/2020    Heart murmur 02/14/2020    Arthritis of ankle 02/14/2020    BMI 40.0-44.9, adult 02/14/2020    Vitamin D deficiency 02/14/2020       Past Surgical History:   Procedure Laterality Date    HYSTERECTOMY         Social History     Socioeconomic History    Marital status: Single   Tobacco Use    Smoking status: Never    Smokeless tobacco: Never   Substance and Sexual Activity    Alcohol use: No    Drug use: No     Social  Determinants of Health     Financial Resource Strain: Patient Declined (8/15/2024)    Overall Financial Resource Strain (CARDIA)     Difficulty of Paying Living Expenses: Patient declined   Food Insecurity: No Food Insecurity (8/15/2024)    Hunger Vital Sign     Worried About Running Out of Food in the Last Year: Never true     Ran Out of Food in the Last Year: Never true   Physical Activity: Unknown (8/15/2024)    Exercise Vital Sign     Days of Exercise per Week: 5 days   Stress: Stress Concern Present (8/15/2024)    [employer] - Occupational Stress Questionnaire     Feeling of Stress : Very much   Housing Stability: Unknown (8/15/2024)    Housing Stability Vital Sign     Unable to Pay for Housing in the Last Year: No

## 2024-08-19 NOTE — PROCEDURES
Large Joint Aspiration/Injection: R subacromial bursa    Date/Time: 8/19/2024 9:45 AM    Performed by: Velia Stout MD  Authorized by: Velia Stout MD    Consent Done?:  Yes (Verbal)  Indications:  Pain  Timeout: prior to procedure the correct patient, procedure, and site was verified    Prep: patient was prepped and draped in usual sterile fashion      Local anesthesia used?: Yes    Local anesthetic:  Topical anesthetic    Details:  Needle Size:  22 G  Approach:  Posterior  Location:  Shoulder  Site:  R subacromial bursa  Medications:  40 mg triamcinolone acetonide 40 mg/mL  Patient tolerance:  Patient tolerated the procedure well with no immediate complications

## 2024-09-18 ENCOUNTER — PATIENT OUTREACH (OUTPATIENT)
Dept: ADMINISTRATIVE | Facility: HOSPITAL | Age: 64
End: 2024-09-18
Payer: COMMERCIAL

## 2024-09-18 NOTE — PROGRESS NOTES
Population Health Chart Review & Patient Outreach Details      Additional [facility] Notes:    PAYOR REPORT DUE FOR EYE EXAM AND COLON SCREENING.--  IF DONE NEED RECORDS.  EYE SCHEDULE 11/6/24 WITH   Unable to reach. Sent portal message         Updates Requested / Reviewed:      Updated Care Coordination Note and Care Everywhere         Health Maintenance Topics Overdue:      [facility] Score: 1     Colon Cancer Screening    Influenza Vaccine  Tetanus Vaccine  Shingles/Zoster Vaccine  RSV Vaccine                  Health Maintenance Topic(s) Outreach Outcomes & Actions Taken:    Eye Exam - Outreach Outcomes & Actions Taken  : Eye Camera Scheduled or Optometry/Ophthalmology Referral Placed/Appt Scheduled    Colorectal Cancer Screening - Outreach Outcomes & Actions Taken  : Unable to reach. Sent portal message

## 2024-09-25 ENCOUNTER — PATIENT OUTREACH (OUTPATIENT)
Dept: ADMINISTRATIVE | Facility: HOSPITAL | Age: 64
End: 2024-09-25
Payer: COMMERCIAL

## 2024-09-25 NOTE — LETTER
September 25, 2024    Joanie Chandler  [address] [facility] [address]             [facility]   [address] [place] [address]  Phone: [phone]     Dear Joanie Chandler        This message is being sent to you due to not being able to contact you by phone   [facility] is committed to your overall health.  Our records indicate that you are due for the following test and/or procedures:    Health Maintenance Due   Topic    Colorectal Cancer Screening          Please call [phone] or [phone]   If you have had any of the above done at another facility, please let us know so that we may obtain copies from that facility.  If you have a copy of these records, please provide a copy for us to scan into your chart.  You are welcome to request that the report be faxed to us at  (FAX: [phone]).      Thank you  Priscilla Avina MA  Care Coordinator for [facility]

## 2024-09-25 NOTE — PROGRESS NOTES
Population Health Chart Review & Patient Outreach Details      Additional [facility] Notes:    PAYOR REPORT DUE FOR EYE EXAM AND COLON SCREENING.--  IF DONE NEED RECORDS.  EYE SCHEDULE 11/6/24 WITH .   OUTREACHED 9/19/24 SENT PORTAL MESSAGE - MAIL OUT LETTER REGARDING COLON SCREENING DUE.           Updates Requested / Reviewed:      Updated Care Coordination Note and Care Everywhere         Health Maintenance Topics Overdue:      [facility] Score: 1     Colon Cancer Screening    Influenza Vaccine  Tetanus Vaccine  Shingles/Zoster Vaccine  RSV Vaccine                  Health Maintenance Topic(s) Outreach Outcomes & Actions Taken:    Eye Exam - Outreach Outcomes & Actions Taken  : Eye Camera Scheduled or Optometry/Ophthalmology Referral Placed/Appt Scheduled    Colorectal Cancer Screening - Outreach Outcomes & Actions Taken  : LAST OUTREACH UNABLE REACHED SENT PORTAL MESSAGE. LETTER MAIL.

## 2024-10-02 ENCOUNTER — TELEPHONE (OUTPATIENT)
Dept: PHARMACY | Facility: CLINIC | Age: 64
End: 2024-10-02
Payer: COMMERCIAL

## 2024-10-03 NOTE — TELEPHONE ENCOUNTER
[facility]/Population Health Chart Review & Patient Outreach Details For Medication Adherence Project    Reason for Outreach Encounter: 3rd Party payor non-compliance report ([employer], BC[employer], C, etc)  2.  Patient Outreach Method: ClearKarmahart message  3.   Medication in question:    LAST FILLED: rosuvastatin - 3/19/24 for 90 day supply  Valsartan - 6/23/24 for 90 day supply  Hypertension Medications               valsartan (DIOVAN) 80 MG tablet Take 1 tablet (80 mg total) by mouth once daily.              Hyperlipidemia Medications               rosuvastatin (CRESTOR) 20 MG tablet Take 1 tablet (20 mg total) by mouth once daily.               4.  Reviewed and or Updates Made To: Patient Chart  5. Outreach Outcomes and/or actions taken: Sent inquiry to patient: Waiting for response.

## 2024-10-10 ENCOUNTER — OFFICE VISIT (OUTPATIENT)
Dept: ENDOCRINOLOGY | Facility: CLINIC | Age: 64
End: 2024-10-10
Payer: COMMERCIAL

## 2024-10-10 VITALS
BODY MASS INDEX: 32.52 KG/M2 | SYSTOLIC BLOOD PRESSURE: 120 MMHG | DIASTOLIC BLOOD PRESSURE: 64 MMHG | TEMPERATURE: 98 F | HEART RATE: 98 BPM | WEIGHT: 161 LBS

## 2024-10-10 DIAGNOSIS — E11.9 TYPE 2 DIABETES MELLITUS WITHOUT COMPLICATION, WITHOUT LONG-TERM CURRENT USE OF INSULIN: Primary | ICD-10-CM

## 2024-10-10 DIAGNOSIS — E66.9 NON MORBID OBESITY, UNSPECIFIED OBESITY TYPE: ICD-10-CM

## 2024-10-10 LAB — GLUCOSE SERPL-MCNC: 112 MG/DL (ref 70–110)

## 2024-10-10 PROCEDURE — 82962 GLUCOSE BLOOD TEST: CPT | Mod: S$GLB,,, | Performed by: NURSE PRACTITIONER

## 2024-10-10 PROCEDURE — 99999 PR PBB SHADOW E&M-EST. PATIENT-LVL IV: CPT | Mod: PBBFAC,,, | Performed by: NURSE PRACTITIONER

## 2024-10-10 RX ORDER — SEMAGLUTIDE 2.68 MG/ML
2 INJECTION, SOLUTION SUBCUTANEOUS
Qty: 9 ML | Refills: 1 | Status: SHIPPED | OUTPATIENT
Start: 2024-10-10 | End: 2025-10-10

## 2024-10-10 NOTE — PROGRESS NOTES
CC: This 63 y.o. Black or  female  is here for evaluation of  T2DM along with comorbidities indicated in the Visit Diagnosis section of this encounter.    HPI: Joanie Chandler was diagnosed with T2DM in ~ 2010.     DM COMPLICATIONS: none        Prior visit 2/8/24  A1c is down from 8.2 to 7.2%.   Feels overwhelmed taking care of her sister who has advanced cancer.   Plan Reviewed glycemic goals.   Schedule yearly eye exam.   Change from Trulicity 4.5 mg to Mounjaro 10 mg weekly.   Continue metformin.   Resume monitoring glucoses - 1x/day before or 2 hours after meals.   Return to clinic in 3 months with labs prior.       Interval hx  A1c has risen from 7.2% in Feb to now 7.6%.   Last seen several months ago. Pt unable to tolerate Mounjaro after lov so she went back to Trulicity. C/o feeling weird with Mounjaro. Pt then switched from Trulicity 4.5 mg to Ozempic 2 mg in June d/t Trulicity shortage.     She has not been testing BGs much.   C/o severe shoulder pain.         LAST DIABETES EDUCATION: never     HOSPITALIZED FOR DIABETES -  No.    SIGNIFICANT DIABETES MED HISTORY:  Metformin instant release - diarrhea  Trulicity started 10/2022    PRESCRIBED DIABETES MEDICATIONS:   Metformin  mg bid   Ozempic 2 mg once weekly on Mondays       Misses medication doses - denies       SELF MONITORING BLOOD GLUCOSE: Checks blood glucose at home - maybe 1x/week.   POCT glucose 112 - fasting     HYPOGLYCEMIC EPISODES:  none     CURRENT DIET: Drinks water, crystal lite. Eats 3 meals/day.   Limits fried foods.     Diet recall: Dinner was SF lemonade, spinach, baked chicken. Lunch was shrimp stew with spoon of rice.     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     PHYSICAL  Orthopedic Joint Progress Note    2019  Admit Date: 12/3/2019  Admit Diagnosis: Primary osteoarthritis of right knee [M17.11]  Arthritis of knee, right [M17.11]    1 Day Post-Op    Subjective:     Parker Mcdowell awake and alert    Review of Systems: Pertinent items are noted in HPI. Objective:     PT/OT:     PATIENT MOBILITY    Bed Mobility  Supine to Sit: Minimum assistance  Scooting: Minimum assistance  Transfers  Sit to Stand: Minimum assistance  Stand to Sit: Minimum assistance  Bed to Chair: Minimum assistance      Gait  Speed/Alice: Delayed  Step Length: Left shortened, Right shortened  Stance: Right decreased  Gait Abnormalities: Antalgic  Ambulation - Level of Assistance: Contact guard assistance  Distance (ft): 25 Feet (ft)  Assistive Device: Walker, rolling  Interventions: Safety awareness training, Tactile cues, Verbal cues   Weight Bearing Status  Right Side Weight Bearing: As tolerated        Vital Signs:    Blood pressure 118/67, pulse 87, temperature 98 °F (36.7 °C), resp. rate 16, height 5' 11\" (1.803 m), weight 118.4 kg (261 lb), SpO2 90 %.   Temp (24hrs), Av °F (36.7 °C), Min:97.8 °F (36.6 °C), Max:98.2 °F (36.8 °C)      Pain Control:   Pain Assessment  Pain Scale 1: Numeric (0 - 10)  Pain Intensity 1: 3  Pain Onset 1: years  Pain Location 1: Knee  Pain Orientation 1: Right  Pain Description 1: Aching  Pain Intervention(s) 1: Medication (see MAR)    Meds:  Current Facility-Administered Medications   Medication Dose Route Frequency    fluticasone propionate (FLONASE) 50 mcg/actuation nasal spray 2 Spray  2 Spray Both Nostrils BID PRN    ipratropium (ATROVENT) 42 mcg (0.06 %) nasal spray 1 Spray (Patient Supplied)  1 Spray Both Nostrils QID PRN    pantoprazole (PROTONIX) tablet 40 mg  40 mg Oral DAILY    alcohol 62% (NOZIN) nasal  1 Ampule  1 Ampule Topical Q12H    0.9% sodium chloride infusion  100 mL/hr IntraVENous CONTINUOUS    sodium chloride (NS) flush "EXAM:  GENERAL: Well developed, well nourished. No acute distress.   PSYCH: AAOx3, appropriate mood and affect, conversant, well-groomed. Judgement and insight good.   NEURO: Cranial nerves grossly intact. Speech clear, no tremor.   CHEST: Respirations even and unlabored.     Hemoglobin A1C   Date Value Ref Range Status   08/07/2024 7.6 (H) 4.0 - 5.6 % Final     Comment:     ADA Screening Guidelines:  5.7-6.4%  Consistent with prediabetes  >or=6.5%  Consistent with diabetes    High levels of fetal hemoglobin interfere with the HbA1C  assay. Heterozygous hemoglobin variants (HbS, HgC, etc)do  not significantly interfere with this assay.   However, presence of multiple variants may affect accuracy.     05/04/2024 7.7 (H) 4.0 - 5.6 % Final     Comment:     ADA Screening Guidelines:  5.7-6.4%  Consistent with prediabetes  >or=6.5%  Consistent with diabetes    High levels of fetal hemoglobin interfere with the HbA1C  assay. Heterozygous hemoglobin variants (HbS, HgC, etc)do  not significantly interfere with this assay.   However, presence of multiple variants may affect accuracy.     02/03/2024 7.2 (H) 4.0 - 5.6 % Final     Comment:     ADA Screening Guidelines:  5.7-6.4%  Consistent with prediabetes  >or=6.5%  Consistent with diabetes    High levels of fetal hemoglobin interfere with the HbA1C  assay. Heterozygous hemoglobin variants (HbS, HgC, etc)do  not significantly interfere with this assay.   However, presence of multiple variants may affect accuracy.         No results found for: "CPEPTIDE", "GLUTAMICACID", "ISLETCELLANT", "FRUCTOSAMINE"     Lab Results   Component Value Date    CHOL 140 02/03/2024    CHOL 125 02/17/2023    CHOL 185 09/29/2022     Lab Results   Component Value Date    HDL 55 02/03/2024    HDL 50 02/17/2023    HDL 55 09/29/2022     Lab Results   Component Value Date    LDLCALC 75.6 02/03/2024    LDLCALC 64.4 02/17/2023    LDLCALC 110.6 09/29/2022     Lab Results   Component Value Date    TRIG 47 " 5-40 mL  5-40 mL IntraVENous Q8H    sodium chloride (NS) flush 5-40 mL  5-40 mL IntraVENous PRN    acetaminophen (TYLENOL) tablet 1,000 mg  1,000 mg Oral Q6H    celecoxib (CELEBREX) capsule 200 mg  200 mg Oral Q12H    oxyCODONE IR (ROXICODONE) tablet 5-10 mg  5-10 mg Oral Q4H PRN    HYDROmorphone (PF) (DILAUDID) injection 1 mg  1 mg IntraVENous Q3H PRN    naloxone (NARCAN) injection 0.2-0.4 mg  0.2-0.4 mg IntraVENous Q10MIN PRN    dexamethasone (DECADRON) injection 10 mg  10 mg IntraVENous ONCE    promethazine (PHENERGAN) tablet 25 mg  25 mg Oral Q6H PRN    diphenhydrAMINE (BENADRYL) capsule 25 mg  25 mg Oral Q4H PRN    senna-docusate (PERICOLACE) 8.6-50 mg per tablet 2 Tab  2 Tab Oral DAILY    aspirin delayed-release tablet 81 mg  81 mg Oral Q12H    ondansetron (ZOFRAN ODT) tablet 8 mg  8 mg Oral Q8H PRN    azelastine (ASTELIN) 137mcg/spray nasal spray (Patient Supplied)  1 Spray Both Nostrils BID    loratadine (CLARITIN) tablet 10 mg  10 mg Oral DAILY PRN        LAB:    Lab Results   Component Value Date/Time    INR 0.9 11/12/2019 09:25 AM     Lab Results   Component Value Date/Time    HGB 14.7 12/03/2019 07:19 PM    HGB 15.7 11/12/2019 09:25 AM    HGB 16.0 12/14/2017 11:27 AM       Wound Hand Left (Active)   Number of days: 268       Wound Knee Right (Active)   Dressing Status Clean, dry, and intact 12/4/2019  2:50 AM   Dressing Type Aquacel 12/4/2019  2:50 AM   Number of days: 1         Physical Exam:  Calves soft/ neuro intact      Assessment:      Principal Problem:    S/P TKR (total knee replacement) using cement, right (12/4/2019)    Active Problems:    Arthritis of knee, right (12/3/2019)         Plan:     Continue PT/OT/Rehab  Consult: Rehab team including PT, OT, recreational therapy, and    Home soon    Patient Expects to be Discharged to[de-identified] Private residence 02/03/2024    TRIG 53 02/17/2023    TRIG 97 09/29/2022     Lab Results   Component Value Date    CHOLHDL 39.3 02/03/2024    CHOLHDL 40.0 02/17/2023    CHOLHDL 29.7 09/29/2022         Component Value Date/Time     08/07/2024 0714    K 4.1 08/07/2024 0714     08/07/2024 0714    CO2 26 08/07/2024 0714    BUN 11 08/07/2024 0714    CREATININE 0.8 08/07/2024 0714     (H) 08/07/2024 0714    CALCIUM 10.2 08/07/2024 0714    ALKPHOS 90 08/07/2024 0714    AST 16 08/07/2024 0714    ALT 11 08/07/2024 0714    BILITOT 0.4 08/07/2024 0714    EGFRNORACEVR >60.0 08/07/2024 0714    ESTGFRAFRICA >60 03/11/2021 1108         Lab Results   Component Value Date    LABMICR 5.0 10/17/2023    CREATRANDUR 154.0 10/17/2023    MICALBCREAT 3.2 10/17/2023             ASSESSMENT and PLAN:    A1C GOAL: < 7 %     1. Type 2 diabetes mellitus without complication, without long-term current use of insulin  Unable to evaluate glucoses due to lack of testing at home.   A1c continues to indicate uncontrolled diabetes.     Continue current medications.   Recommend checking glucose at least 1x/day  - before/2 hours after meals.   Send blood sugar log for sooner advice if desired.   Return to clinic in 4 months with A1c and CGM study.     Ambulatory referral/consult to Endocrinology    POCT Glucose, Hand-Held Device    semaglutide (OZEMPIC) 2 mg/dose (8 mg/3 mL) PnIj    Hemoglobin A1C         2. Non morbid obesity, unspecified obesity type  semaglutide (OZEMPIC) 2 mg/dose (8 mg/3 mL) PnIj                  Orders Placed This Encounter   Procedures    POCT Glucose, Hand-Held Device          No follow-ups on file.

## 2024-10-10 NOTE — PATIENT INSTRUCTIONS
A1C goal: <7%  Fasting/premeal blood glucose goal:   2 hour post-meal blood glucose goal: less than 180    Unable to evaluate glucoses due to lack of testing at home.   A1c continues to indicate uncontrolled diabetes.     Continue current medications.   Recommend checking glucose at least 1x/day  - before/2 hours after meals.   Send blood sugar log for sooner advice.   Return to clinic in 4 months with A1c and CGM study.

## 2024-11-10 ENCOUNTER — TELEPHONE (OUTPATIENT)
Dept: PHARMACY | Facility: CLINIC | Age: 64
End: 2024-11-10
Payer: COMMERCIAL

## 2024-11-10 NOTE — TELEPHONE ENCOUNTER
[facility]/Population Health Chart Review & Patient Outreach Details For Medication Adherence Project    Reason for Outreach Encounter: 3rd Party payor non-compliance report ([employer], [employer], C, etc)  2.  Patient Outreach Method: Reviewed patient chart   3.   Medication in question:   Hyperlipidemia Medications               rosuvastatin (CRESTOR) 20 MG tablet Take 1 tablet (20 mg total) by mouth once daily.                  Crestor  last filled  10/18/24 for 90 day supply      4.  Reviewed and or Updates Made To: Patient Chart  5. Outreach Outcomes and/or actions taken: Patient filled medication and is on track to be adherent  Additional Notes:

## 2024-11-11 ENCOUNTER — TELEPHONE (OUTPATIENT)
Dept: PHARMACY | Facility: CLINIC | Age: 64
End: 2024-11-11
Payer: COMMERCIAL

## 2024-11-12 NOTE — TELEPHONE ENCOUNTER
[facility]/Population Health Chart Review & Patient Outreach Details For Medication Adherence Project    Reason for Outreach Encounter: 3rd Party payor non-compliance report ([employer], [employer], C, etc)  2.  Patient Outreach Method: Reviewed Patient Chart  3.   Medication in question: valsartan    LAST FILLED: 10/18/24 for 90 day supply  Hypertension Medications               valsartan (DIOVAN) 80 MG tablet Take 1 tablet (80 mg total) by mouth once daily.              4.  Reviewed and or Updates Made To: Patient Chart  5. Outreach Outcomes and/or actions taken: Patient filled medication and is on track to be adherent

## 2024-12-09 ENCOUNTER — TELEPHONE (OUTPATIENT)
Dept: ORTHOPEDICS | Facility: CLINIC | Age: 64
End: 2024-12-09
Payer: COMMERCIAL

## 2024-12-09 NOTE — TELEPHONE ENCOUNTER
Spoke with patient. Rescheduled for 12/18 at 1:30 pm. Patient verbalized understanding and confirmed new appointment time.     Lauryn Eli MS, ATC, OTC  Clinical/Surgical Assistant - Dr. Velia Stout  Orthopedics  Phone: [phone]

## 2025-02-05 NOTE — PROGRESS NOTES
Assessment: 64 y.o. female with right cuff pathology     I explained my diagnostic impression and the reasoning behind it in detail, using layman's terms.  Models and/or pictures were used to help in the explanation.    Plan:   - Will resend referral for PT referral to [facility] - patient states she was contacted last time but not scheduled   - Injection of the right subacromial space  performed, please see procedure note for more details.  Prior to the injection risks and benefits of corticosteroid injection were discussed with the patient including pain, infection, bleeding, skin color changes, swelling, steroid flare. We discussed that over time injections can result in chondral damage, acceleration of arthritis formation, damage to tendons and damage to joints.  The patient consented for the procedure.  Post-injection instructions were given to the patient in writing. I discussed the risk of increased blood glucose following steroid injection in the setting of diabetes- the patient will monitor closely for the next 24 hours and call her primary care physician with any questions or concerns regarding blood glucose control   - Patient reports previous naproxen prescription was not received by her pharmacy. Confirmed she wants it sent to [facility]. Will resend naproxen 500 mg BID x 1 weeks then PRN. The patient was advised that NSAID-type medications have important potential side effects: gastrointestinal irritation, GI bleeding, cardiac effects and renal injuries. Take the medication with food and to stop and call the office for any GI upset, vomiting, abdominal pain or black/bloody stools. The patient expresses understanding of these issues and questions were answered.  - Return to clinic in 12 weeks. Return sooner if symptoms worsen or fail to improve.     All questions were answered in detail. The patient is in full agreement with the treatment plan and will proceed accordingly.    Chief  Complaint   Patient presents with    Shoulder Pain     Right shoulder pain        Initial visit (8/19/24): Joanie Chandler is a 64 y.o. female who presents today complaining of right shoulder pain  Duration of symptoms:  about 2 months  Trauma or new activity: no  Pain is intermittent  Aggravating factors: pain at night, reaching overhead, pain in the morning when she first gets up  Relieving factors: rest  Night pain is present and is disruptive to sleep  Radicular symptoms: no   Localizes pain to subdeltoid fossa    Associated symptoms:  limited range of motion.    Prior treatment:   topical creams and patches  and tylenol  without improvement in pain.   Took Aleve once without relief   Pain does interfere with sleep and activities of daily living .    Interval History PA 2/6/25:  Patient returns today for ongoing right shoulder pain. Reports the pain returned about 2 months ago.   She did not start PT - reports she was contacted by [facility] but she didn't know who was calling her and never got scheduled.   Did not take naproxen - in her chart looks like prescription was sent in, but she reports her pharmacy did not receive it.   Pain is worse at night  Requesting repeat injection today as the previous injection provided relief for 4-5 months     This is the extent of the patient's complaints at this time.     Hand dominance: Right     Occupation:housekeeping     Review of patient's allergies indicates:  No Known Allergies      Physical Exam:   There were no vitals filed for this visit.      General: Patient is alert, awake and oriented to time, place and person. Mood and affect are appropriate.  Patient does not appear to be in any distress, denies any constitutional symptoms and appears stated age.   HEENT: Pupils are equal and round, sclera are not injected. External examination of ears and nose reveals no abnormalities. Cranial nerves II-X are grossly intact  Skin: no rashes, abrasions or open wounds on the  affected extremity   Resp: No respiratory distress or audible wheezing   CV: 2+  pulses, all extremities warm and well perfused   Right Shoulder    Shoulder Range of Motion    Right     Left   (Active/Passive)       Forward Elevation     150/160            165/165  External rotation (arm at side)  50/50             50/50   Internal rotation behind the back  L5             L5     Range of motion is painful     Acromioclavicular joint is tender  Crossbody test: negative    Neer's positive  Hawkin's positive    Braden's positive  Drop arm negative  Belly press negative      Cuff Strength     Right     Left   Supraspinatus        5/5    5/5  Infraspinatus     5/5    5/5  Subscapularis     5/5    5/5    Deltoid testing            5/5    5/5    Speeds positive  Yergasons negative    Elbow examination demonstrates no tenderness to palpation and has normal range of motion.     ltsi C5-T1  + epl, io, fds, fdp   2+ RP      Imaging: 3 views of the right shoulder:  positive for degenerative changes of the AC joint. The humeral head is well centered on the AP and axillary views.  There are cortical change of the greater consistent with chronic cuff pathology. There is not significant degenerative change of the glenohumeral joint or posterior subluxation of the humeral head. No acute changes or fracture.      I personally reviewed and interpreted the patient's imaging obtained today in clinic     A note notifying No ref. provider found of my findings was sent via the electronic medical record     This note was created by combination of typed  and M-Modal dictation. Transcription and phonetic errors may be present.  If there are any questions, please contact me.      Current Outpatient Medications:     azelastine (ASTELIN) 137 mcg (0.1 %) nasal spray, 2 sprays (274 mcg total) by Nasal route 2 (two) times daily., Disp: 30 mL, Rfl: 11    blood sugar diagnostic Strp, Check blood glucose 2 times a day, Disp: 200 strip, Rfl:  3    blood-glucose meter kit, Test glucose 2x/day, Disp: 1 each, Rfl: 0    chlorhexidine (PERIDEX) 0.12 % solution, 15 mLs 2 (two) times daily., Disp: , Rfl:     diclofenac sodium (VOLTAREN ARTHRITIS PAIN) 1 % Gel, Apply 2 g topically 2 (two) times daily as needed (pain)., Disp: 100 each, Rfl: 1    fluticasone propionate (FLONASE) 50 mcg/actuation nasal spray, 1 spray (50 mcg total) by Each Nostril route once daily., Disp: 16 g, Rfl: 0    fluticasone propionate (FLONASE) 50 mcg/actuation nasal spray, 1 spray (50 mcg total) by Each Nostril route once daily., Disp: 16 mL, Rfl: 11    lancets Misc, Check blood glucose 2x/day, Disp: 200 each, Rfl: 3    lancing device Misc, One device, used to check blood glucose, ICD-10: E11.9, Disp: 1 each, Rfl: 0    levocetirizine (XYZAL) 5 MG tablet, Take 1 tablet (5 mg total) by mouth every evening., Disp: 30 tablet, Rfl: 11    metFORMIN (GLUCOPHAGE-XR) 500 MG ER 24hr tablet, Take 1 tablet (500 mg total) by mouth 2 (two) times daily with meals., Disp: 180 tablet, Rfl: 2    naproxen (NAPROSYN) 500 MG tablet, Take 1 tablet (500 mg total) by mouth 2 (two) times daily., Disp: 60 tablet, Rfl: 0    rosuvastatin (CRESTOR) 20 MG tablet, Take 1 tablet (20 mg total) by mouth once daily., Disp: 90 tablet, Rfl: 2    semaglutide (OZEMPIC) 2 mg/dose (8 mg/3 mL) PnIj, Inject 2 mg into the skin every 7 days., Disp: 9 mL, Rfl: 1    valsartan (DIOVAN) 80 MG tablet, Take 1 tablet (80 mg total) by mouth once daily., Disp: 90 tablet, Rfl: 2    Past Medical History:   Diagnosis Date    Allergy     Arthritis     Blood clot in vein     Diabetes mellitus     Diabetes mellitus, type 2     Hyperlipidemia     Hypertension     Uncontrolled type 2 diabetes mellitus with hyperglycemia 2/14/2020       Active Problem List with Overview Notes    Diagnosis Date Noted    Severe obesity (BMI 35.0-39.9) with comorbidity 08/03/2024    Abnormal CT lung screening 10/20/2023    Solitary pulmonary nodule 10/20/2023     Seasonal allergic rhinitis due to pollen 10/20/2023    Refractive error 12/10/2021    Vaginal candidiasis 04/22/2020    Hyperlipidemia LDL goal <70 02/21/2020    Type 2 diabetes mellitus with hyperglycemia, without long-term current use of insulin 02/14/2020    Essential hypertension 02/14/2020    Pain of both breasts 02/14/2020    Heart murmur 02/14/2020    Arthritis of ankle 02/14/2020    BMI 40.0-44.9, adult 02/14/2020    Vitamin D deficiency 02/14/2020       Past Surgical History:   Procedure Laterality Date    HYSTERECTOMY         Social History     Socioeconomic History    Marital status: Single   Tobacco Use    Smoking status: Never    Smokeless tobacco: Never   Substance and Sexual Activity    Alcohol use: No    Drug use: No     Social Drivers of Health     Financial Resource Strain: Patient Declined (8/15/2024)    Overall Financial Resource Strain (CARDIA)     Difficulty of Paying Living Expenses: Patient declined   Food Insecurity: No Food Insecurity (8/15/2024)    Hunger Vital Sign     Worried About Running Out of Food in the Last Year: Never true     Ran Out of Food in the Last Year: Never true   Physical Activity: Unknown (8/15/2024)    Exercise Vital Sign     Days of Exercise per Week: 5 days   Stress: Stress Concern Present (8/15/2024)    [employer] - Occupational Stress Questionnaire     Feeling of Stress : Very much   Housing Stability: Unknown (8/15/2024)    Housing Stability Vital Sign     Unable to Pay for Housing in the Last Year: No

## 2025-02-06 ENCOUNTER — OFFICE VISIT (OUTPATIENT)
Dept: ORTHOPEDICS | Facility: CLINIC | Age: 65
End: 2025-02-06
Payer: COMMERCIAL

## 2025-02-06 VITALS — BODY MASS INDEX: 32.44 KG/M2 | WEIGHT: 160.94 LBS | HEIGHT: 59 IN

## 2025-02-06 DIAGNOSIS — M25.511 CHRONIC RIGHT SHOULDER PAIN: Primary | ICD-10-CM

## 2025-02-06 DIAGNOSIS — G89.29 CHRONIC RIGHT SHOULDER PAIN: Primary | ICD-10-CM

## 2025-02-06 PROCEDURE — 99213 OFFICE O/P EST LOW 20 MIN: CPT | Mod: 25,S$GLB,,

## 2025-02-06 PROCEDURE — 4010F ACE/ARB THERAPY RXD/TAKEN: CPT | Mod: CPTII,S$GLB,,

## 2025-02-06 PROCEDURE — 99999 PR PBB SHADOW E&M-EST. PATIENT-LVL IV: CPT | Mod: PBBFAC,,,

## 2025-02-06 PROCEDURE — 1160F RVW MEDS BY RX/DR IN RCRD: CPT | Mod: CPTII,S$GLB,,

## 2025-02-06 PROCEDURE — 1159F MED LIST DOCD IN RCRD: CPT | Mod: CPTII,S$GLB,,

## 2025-02-06 PROCEDURE — 20610 DRAIN/INJ JOINT/BURSA W/O US: CPT | Mod: RT,S$GLB,,

## 2025-02-06 PROCEDURE — 3008F BODY MASS INDEX DOCD: CPT | Mod: CPTII,S$GLB,,

## 2025-02-06 RX ORDER — NAPROXEN 500 MG/1
500 TABLET ORAL 2 TIMES DAILY
Qty: 60 TABLET | Refills: 0 | Status: SHIPPED | OUTPATIENT
Start: 2025-02-06

## 2025-02-06 RX ORDER — TRIAMCINOLONE ACETONIDE 40 MG/ML
40 INJECTION, SUSPENSION INTRA-ARTICULAR; INTRAMUSCULAR
Status: DISCONTINUED | OUTPATIENT
Start: 2025-02-06 | End: 2025-02-06 | Stop reason: HOSPADM

## 2025-02-06 RX ADMIN — TRIAMCINOLONE ACETONIDE 40 MG: 40 INJECTION, SUSPENSION INTRA-ARTICULAR; INTRAMUSCULAR at 09:02

## 2025-02-06 NOTE — PROCEDURES
Large Joint Aspiration/Injection: R subacromial bursa    Date/Time: 2/6/2025 9:30 AM    Performed by: Jo Zayas PA-C  Authorized by: Jo Zayas PA-C    Consent Done?:  Yes (Verbal)  Indications:  Pain  Prep: patient was prepped and draped in usual sterile fashion      Local anesthesia used?: Yes    Anesthesia:  Local infiltration  Local anesthetic:  Topical anesthetic    Details:  Needle Size:  22 G  Ultrasonic Guidance for needle placement?: No    Approach:  Posterior  Location:  Shoulder  Site:  R subacromial bursa  Medications:  40 mg triamcinolone acetonide 40 mg/mL  Patient tolerance:  Patient tolerated the procedure well with no immediate complications

## 2025-02-11 ENCOUNTER — LAB VISIT (OUTPATIENT)
Dept: LAB | Facility: HOSPITAL | Age: 65
End: 2025-02-11
Attending: NURSE PRACTITIONER
Payer: COMMERCIAL

## 2025-02-11 ENCOUNTER — OFFICE VISIT (OUTPATIENT)
Dept: ENDOCRINOLOGY | Facility: CLINIC | Age: 65
End: 2025-02-11
Payer: COMMERCIAL

## 2025-02-11 VITALS
DIASTOLIC BLOOD PRESSURE: 80 MMHG | BODY MASS INDEX: 33.08 KG/M2 | SYSTOLIC BLOOD PRESSURE: 135 MMHG | HEART RATE: 80 BPM | WEIGHT: 163.81 LBS | TEMPERATURE: 98 F

## 2025-02-11 DIAGNOSIS — E11.9 TYPE 2 DIABETES MELLITUS WITHOUT COMPLICATION, WITHOUT LONG-TERM CURRENT USE OF INSULIN: ICD-10-CM

## 2025-02-11 DIAGNOSIS — E11.9 TYPE 2 DIABETES MELLITUS WITHOUT COMPLICATION, WITHOUT LONG-TERM CURRENT USE OF INSULIN: Primary | ICD-10-CM

## 2025-02-11 DIAGNOSIS — E66.9 NON MORBID OBESITY, UNSPECIFIED OBESITY TYPE: ICD-10-CM

## 2025-02-11 LAB
ESTIMATED AVG GLUCOSE: 169 MG/DL (ref 68–131)
HBA1C MFR BLD: 7.5 % (ref 4–5.6)

## 2025-02-11 PROCEDURE — 36415 COLL VENOUS BLD VENIPUNCTURE: CPT | Performed by: NURSE PRACTITIONER

## 2025-02-11 PROCEDURE — 3079F DIAST BP 80-89 MM HG: CPT | Mod: CPTII,S$GLB,, | Performed by: NURSE PRACTITIONER

## 2025-02-11 PROCEDURE — 1159F MED LIST DOCD IN RCRD: CPT | Mod: CPTII,S$GLB,, | Performed by: NURSE PRACTITIONER

## 2025-02-11 PROCEDURE — 4010F ACE/ARB THERAPY RXD/TAKEN: CPT | Mod: CPTII,S$GLB,, | Performed by: NURSE PRACTITIONER

## 2025-02-11 PROCEDURE — 99214 OFFICE O/P EST MOD 30 MIN: CPT | Mod: S$GLB,,, | Performed by: NURSE PRACTITIONER

## 2025-02-11 PROCEDURE — G2211 COMPLEX E/M VISIT ADD ON: HCPCS | Mod: S$GLB,,, | Performed by: NURSE PRACTITIONER

## 2025-02-11 PROCEDURE — 99999 PR PBB SHADOW E&M-EST. PATIENT-LVL III: CPT | Mod: PBBFAC,,, | Performed by: NURSE PRACTITIONER

## 2025-02-11 PROCEDURE — 1160F RVW MEDS BY RX/DR IN RCRD: CPT | Mod: CPTII,S$GLB,, | Performed by: NURSE PRACTITIONER

## 2025-02-11 PROCEDURE — 83036 HEMOGLOBIN GLYCOSYLATED A1C: CPT | Performed by: NURSE PRACTITIONER

## 2025-02-11 PROCEDURE — 3008F BODY MASS INDEX DOCD: CPT | Mod: CPTII,S$GLB,, | Performed by: NURSE PRACTITIONER

## 2025-02-11 PROCEDURE — 3075F SYST BP GE 130 - 139MM HG: CPT | Mod: CPTII,S$GLB,, | Performed by: NURSE PRACTITIONER

## 2025-02-11 RX ORDER — METFORMIN HYDROCHLORIDE 500 MG/1
500 TABLET, EXTENDED RELEASE ORAL 2 TIMES DAILY WITH MEALS
Qty: 180 TABLET | Refills: 2 | Status: SHIPPED | OUTPATIENT
Start: 2025-02-11

## 2025-02-11 RX ORDER — SEMAGLUTIDE 2.68 MG/ML
2 INJECTION, SOLUTION SUBCUTANEOUS
Qty: 9 ML | Refills: 2 | Status: SHIPPED | OUTPATIENT
Start: 2025-02-11 | End: 2026-02-11

## 2025-02-11 NOTE — PATIENT INSTRUCTIONS
Unable to evaluate glucose trends and adjust DM meds as there is no recent A1c or SMBG.   Continue current medications.   If A1c high today, pt will focus more on diet/exercise. Declines additional medication.  Test glucose 1-2x/day.   Return to clinic in 3 months with labs prior, CGM study prior.

## 2025-02-11 NOTE — PROGRESS NOTES
CC: This 64 y.o. Black or  female  is here for evaluation of  T2DM along with comorbidities indicated in the Visit Diagnosis section of this encounter.    HPI: Joanie Chandler was diagnosed with T2DM in ~ 2010.     DM COMPLICATIONS: none        Prior visit 10/10/24  A1c has risen from 7.2% in Feb to now 7.6%.   Last seen several months ago. Pt unable to tolerate Mounjaro after lov so she went back to Trulicity. C/o feeling weird with Mounjaro. Pt then switched from Trulicity 4.5 mg to Ozempic 2 mg in June d/t Trulicity shortage.   She has not been testing BGs much.   C/o severe shoulder pain.   Plan Unable to evaluate glucoses due to lack of testing at home.   A1c continues to indicate uncontrolled diabetes.   Continue current medications.   Recommend checking glucose at least 1x/day  - before/2 hours after meals.   Send blood sugar log for sooner advice if desired.   Return to clinic in 4 months with A1c and CGM study.       Interval hx  Pt did not undergo CGM study. No recent A1c available.   Reports diet is healthy.       LAST DIABETES EDUCATION: never     HOSPITALIZED FOR DIABETES -  No.    SIGNIFICANT DIABETES MED HISTORY:  Metformin instant release - diarrhea  Trulicity started 10/2022    PRESCRIBED DIABETES MEDICATIONS:   Metformin  mg bid   Ozempic 2 mg once weekly on Mondays       Misses medication doses - denies       SELF MONITORING BLOOD GLUCOSE: Checks blood glucose at home - none. Has contour meter.   POCT glucose 136 fasting    HYPOGLYCEMIC EPISODES:  none     CURRENT DIET: Drinks water, crystal lite. No coffee.   Eats 3 meals/day.   Limits fried foods.     Diet recall: Dinner was baked chicken, spaghetti, peas. Lunch was baked chicken and salad. Breakfast - biscuit and sausage. Snacks - 1 small pack et of cookies.       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       PHYSICAL EXAM:  GENERAL: Well developed, well nourished. No acute distress.   PSYCH: AAOx3, appropriate mood and affect, conversant, well-groomed. Judgement and insight good.   NEURO: Cranial nerves grossly intact. Speech clear, no tremor.   CHEST: Respirations even and unlabored.     Protective Sensation (w/ 10 gram monofilament):  Right: Intact  Left: Intact    Visual Inspection:  Skin Breakdown -  Neither    Pedal Pulses:   Right: Present  Left: Present    Posterior Tibialis Pulses:   Right:Diminished  Left: Diminished      Hemoglobin A1C   Date Value Ref Range Status   08/07/2024 7.6 (H) 4.0 - 5.6 % Final     Comment:     ADA Screening Guidelines:  5.7-6.4%  Consistent with prediabetes  >or=6.5%  Consistent with diabetes    High levels of fetal hemoglobin interfere with the HbA1C  assay. Heterozygous hemoglobin variants (HbS, HgC, etc)do  not significantly interfere with this assay.   However, presence of multiple variants may affect accuracy.     05/04/2024 7.7 (H) 4.0 - 5.6 % Final     Comment:     ADA Screening Guidelines:  5.7-6.4%  Consistent with prediabetes  >or=6.5%  Consistent with diabetes    High levels of fetal hemoglobin interfere with the HbA1C  assay. Heterozygous hemoglobin variants (HbS, HgC, etc)do  not significantly interfere with this assay.   However, presence of multiple variants may affect accuracy.     02/03/2024 7.2 (H) 4.0 - 5.6 % Final     Comment:     ADA Screening Guidelines:  5.7-6.4%  Consistent with prediabetes  >or=6.5%  Consistent with diabetes    High levels of fetal hemoglobin interfere with the HbA1C  assay. Heterozygous hemoglobin variants (HbS, HgC, etc)do  not significantly interfere with this assay.   However, presence of multiple variants may affect accuracy.         No results  "found for: "CPEPTIDE", "GLUTAMICACID", "ISLETCELLANT", "FRUCTOSAMINE"     Lab Results   Component Value Date    CHOL 140 02/03/2024    CHOL 125 02/17/2023    CHOL 185 09/29/2022     Lab Results   Component Value Date    HDL 55 02/03/2024    HDL 50 02/17/2023    HDL 55 09/29/2022     Lab Results   Component Value Date    LDLCALC 75.6 02/03/2024    LDLCALC 64.4 02/17/2023    LDLCALC 110.6 09/29/2022     Lab Results   Component Value Date    TRIG 47 02/03/2024    TRIG 53 02/17/2023    TRIG 97 09/29/2022     Lab Results   Component Value Date    CHOLHDL 39.3 02/03/2024    CHOLHDL 40.0 02/17/2023    CHOLHDL 29.7 09/29/2022         Component Value Date/Time     08/07/2024 0714    K 4.1 08/07/2024 0714     08/07/2024 0714    CO2 26 08/07/2024 0714    BUN 11 08/07/2024 0714    CREATININE 0.8 08/07/2024 0714     (H) 08/07/2024 0714    CALCIUM 10.2 08/07/2024 0714    ALKPHOS 90 08/07/2024 0714    AST 16 08/07/2024 0714    ALT 11 08/07/2024 0714    BILITOT 0.4 08/07/2024 0714    EGFRNORACEVR >60.0 08/07/2024 0714    ESTGFRAFRICA >60 03/11/2021 1108         Lab Results   Component Value Date    LABMICR 5.0 10/17/2023    CREATRANDUR 154.0 10/17/2023    MICALBCREAT 3.2 10/17/2023             ASSESSMENT and PLAN:    A1C GOAL: < 7 %     1. Type 2 diabetes mellitus without complication, without long-term current use of insulin  Unable to evaluate glucose trends and adjust DM meds as there is no recent A1c or SMBG.   Continue current medications.   If A1c high today, pt will focus more on diet/exercise. Declines additional medication.  Test glucose 1-2x/day.   Return to clinic in 3 months with labs prior, CGM study prior.     Hemoglobin A1C    Lipid Panel    Microalbumin/Creatinine Ratio, Urine    semaglutide (OZEMPIC) 2 mg/dose (8 mg/3 mL) PnIj    metFORMIN (GLUCOPHAGE-XR) 500 MG ER 24hr tablet    GLUCOSE MONITORING CONTINUOUS MIN 72 HOURS      2. Non morbid obesity, unspecified obesity type  semaglutide (OZEMPIC) " 2 mg/dose (8 mg/3 mL) PnIj          Orders Placed This Encounter   Procedures    Hemoglobin A1C     Standing Status:   Standing     Number of Occurrences:   2     Standing Expiration Date:   5/12/2026     Order Specific Question:   Send normal result to authorizing provider's In Basket if patient is active on MyChart:     Answer:   Yes    Lipid Panel     Standing Status:   Future     Standing Expiration Date:   2/11/2026     Order Specific Question:   Send normal result to authorizing provider's In Basket if patient is active on MyChart:     Answer:   Yes    Microalbumin/Creatinine Ratio, Urine     Standing Status:   Future     Standing Expiration Date:   4/12/2026     Order Specific Question:   Specimen Source     Answer:   Urine    GLUCOSE MONITORING CONTINUOUS MIN 72 HOURS     Standing Status:   Future     Standing Expiration Date:   2/12/2026          Follow up in about 3 months (around 5/11/2025).

## 2025-02-17 ENCOUNTER — TELEPHONE (OUTPATIENT)
Dept: PHARMACY | Facility: CLINIC | Age: 65
End: 2025-02-17
Payer: COMMERCIAL

## 2025-02-17 NOTE — TELEPHONE ENCOUNTER
[facility]/Population Health Chart Review & Patient Outreach Details For Medication Adherence Project    Reason for Outreach Encounter: 3rd Party payor non-compliance report ([employer], [employer], C, etc)  2.  Patient Outreach Method: Reviewed Patient Chart  3.   Medication in question: rosuvastatin   LAST FILLED: 1/7/25 for 90 day supply  Hyperlipidemia Medications              rosuvastatin (CRESTOR) 20 MG tablet Take 1 tablet (20 mg total) by mouth once daily.               4.  Reviewed and or Updates Made To: Patient Chart  5. Outreach Outcomes and/or actions taken: Patient filled medication and is on track to be adherent

## 2025-03-10 ENCOUNTER — OFFICE VISIT (OUTPATIENT)
Dept: FAMILY MEDICINE | Facility: CLINIC | Age: 65
End: 2025-03-10
Payer: COMMERCIAL

## 2025-03-10 ENCOUNTER — LAB VISIT (OUTPATIENT)
Dept: LAB | Facility: HOSPITAL | Age: 65
End: 2025-03-10
Payer: COMMERCIAL

## 2025-03-10 VITALS
RESPIRATION RATE: 18 BRPM | HEART RATE: 66 BPM | DIASTOLIC BLOOD PRESSURE: 92 MMHG | WEIGHT: 164.44 LBS | OXYGEN SATURATION: 99 % | HEIGHT: 59 IN | SYSTOLIC BLOOD PRESSURE: 158 MMHG | BODY MASS INDEX: 33.15 KG/M2 | TEMPERATURE: 98 F

## 2025-03-10 DIAGNOSIS — Z12.31 ENCOUNTER FOR SCREENING MAMMOGRAM FOR BREAST CANCER: ICD-10-CM

## 2025-03-10 DIAGNOSIS — I10 ESSENTIAL HYPERTENSION: ICD-10-CM

## 2025-03-10 DIAGNOSIS — E55.9 VITAMIN D DEFICIENCY: ICD-10-CM

## 2025-03-10 DIAGNOSIS — Z00.00 ANNUAL PHYSICAL EXAM: Primary | ICD-10-CM

## 2025-03-10 DIAGNOSIS — M25.511 CHRONIC RIGHT SHOULDER PAIN: ICD-10-CM

## 2025-03-10 DIAGNOSIS — Z12.12 ENCOUNTER FOR COLORECTAL CANCER SCREENING: ICD-10-CM

## 2025-03-10 DIAGNOSIS — Z00.00 ANNUAL PHYSICAL EXAM: ICD-10-CM

## 2025-03-10 DIAGNOSIS — E11.65 TYPE 2 DIABETES MELLITUS WITH HYPERGLYCEMIA, WITHOUT LONG-TERM CURRENT USE OF INSULIN: ICD-10-CM

## 2025-03-10 DIAGNOSIS — Z12.11 ENCOUNTER FOR COLORECTAL CANCER SCREENING: ICD-10-CM

## 2025-03-10 DIAGNOSIS — Z23 NEEDS FLU SHOT: ICD-10-CM

## 2025-03-10 DIAGNOSIS — G89.29 CHRONIC RIGHT SHOULDER PAIN: ICD-10-CM

## 2025-03-10 LAB
25(OH)D3+25(OH)D2 SERPL-MCNC: 12 NG/ML (ref 30–96)
ALBUMIN SERPL BCP-MCNC: 3.5 G/DL (ref 3.5–5.2)
ALP SERPL-CCNC: 99 U/L (ref 40–150)
ALT SERPL W/O P-5'-P-CCNC: 17 U/L (ref 10–44)
ANION GAP SERPL CALC-SCNC: 7 MMOL/L (ref 8–16)
AST SERPL-CCNC: 38 U/L (ref 10–40)
BASOPHILS # BLD AUTO: 0.03 K/UL (ref 0–0.2)
BASOPHILS NFR BLD: 0.4 % (ref 0–1.9)
BILIRUB SERPL-MCNC: 0.5 MG/DL (ref 0.1–1)
BUN SERPL-MCNC: 13 MG/DL (ref 8–23)
CALCIUM SERPL-MCNC: 10.2 MG/DL (ref 8.7–10.5)
CHLORIDE SERPL-SCNC: 104 MMOL/L (ref 95–110)
CHOLEST SERPL-MCNC: 156 MG/DL (ref 120–199)
CHOLEST/HDLC SERPL: 2.6 {RATIO} (ref 2–5)
CO2 SERPL-SCNC: 27 MMOL/L (ref 23–29)
CREAT SERPL-MCNC: 0.8 MG/DL (ref 0.5–1.4)
DIFFERENTIAL METHOD BLD: ABNORMAL
EOSINOPHIL # BLD AUTO: 0 K/UL (ref 0–0.5)
EOSINOPHIL NFR BLD: 0.5 % (ref 0–8)
ERYTHROCYTE [DISTWIDTH] IN BLOOD BY AUTOMATED COUNT: 13.9 % (ref 11.5–14.5)
EST. GFR  (NO RACE VARIABLE): >60 ML/MIN/1.73 M^2
GLUCOSE SERPL-MCNC: 196 MG/DL (ref 70–110)
HCT VFR BLD AUTO: 40.8 % (ref 37–48.5)
HDLC SERPL-MCNC: 61 MG/DL (ref 40–75)
HDLC SERPL: 39.1 % (ref 20–50)
HGB BLD-MCNC: 12.6 G/DL (ref 12–16)
IMM GRANULOCYTES # BLD AUTO: 0.03 K/UL (ref 0–0.04)
IMM GRANULOCYTES NFR BLD AUTO: 0.4 % (ref 0–0.5)
LDLC SERPL CALC-MCNC: 84.4 MG/DL (ref 63–159)
LYMPHOCYTES # BLD AUTO: 1.4 K/UL (ref 1–4.8)
LYMPHOCYTES NFR BLD: 18.4 % (ref 18–48)
MCH RBC QN AUTO: 28.6 PG (ref 27–31)
MCHC RBC AUTO-ENTMCNC: 30.9 G/DL (ref 32–36)
MCV RBC AUTO: 93 FL (ref 82–98)
MONOCYTES # BLD AUTO: 0.6 K/UL (ref 0.3–1)
MONOCYTES NFR BLD: 7.6 % (ref 4–15)
NEUTROPHILS # BLD AUTO: 5.5 K/UL (ref 1.8–7.7)
NEUTROPHILS NFR BLD: 72.7 % (ref 38–73)
NONHDLC SERPL-MCNC: 95 MG/DL
NRBC BLD-RTO: 0 /100 WBC
PLATELET # BLD AUTO: 274 K/UL (ref 150–450)
PMV BLD AUTO: 11.5 FL (ref 9.2–12.9)
POTASSIUM SERPL-SCNC: 4.2 MMOL/L (ref 3.5–5.1)
PROT SERPL-MCNC: 7 G/DL (ref 6–8.4)
RBC # BLD AUTO: 4.41 M/UL (ref 4–5.4)
SODIUM SERPL-SCNC: 138 MMOL/L (ref 136–145)
TRIGL SERPL-MCNC: 53 MG/DL (ref 30–150)
TSH SERPL DL<=0.005 MIU/L-ACNC: 0.92 UIU/ML (ref 0.4–4)
WBC # BLD AUTO: 7.51 K/UL (ref 3.9–12.7)

## 2025-03-10 PROCEDURE — 1159F MED LIST DOCD IN RCRD: CPT | Mod: CPTII,S$GLB,,

## 2025-03-10 PROCEDURE — 3077F SYST BP >= 140 MM HG: CPT | Mod: CPTII,S$GLB,,

## 2025-03-10 PROCEDURE — 3080F DIAST BP >= 90 MM HG: CPT | Mod: CPTII,S$GLB,,

## 2025-03-10 PROCEDURE — 1160F RVW MEDS BY RX/DR IN RCRD: CPT | Mod: CPTII,S$GLB,,

## 2025-03-10 PROCEDURE — 80061 LIPID PANEL: CPT

## 2025-03-10 PROCEDURE — 84443 ASSAY THYROID STIM HORMONE: CPT

## 2025-03-10 PROCEDURE — 99396 PREV VISIT EST AGE 40-64: CPT | Mod: 25,S$GLB,,

## 2025-03-10 PROCEDURE — 80053 COMPREHEN METABOLIC PANEL: CPT

## 2025-03-10 PROCEDURE — 85025 COMPLETE CBC W/AUTO DIFF WBC: CPT

## 2025-03-10 PROCEDURE — 4010F ACE/ARB THERAPY RXD/TAKEN: CPT | Mod: CPTII,S$GLB,,

## 2025-03-10 PROCEDURE — 99999 PR PBB SHADOW E&M-EST. PATIENT-LVL V: CPT | Mod: PBBFAC,,,

## 2025-03-10 PROCEDURE — 36415 COLL VENOUS BLD VENIPUNCTURE: CPT | Mod: PO

## 2025-03-10 PROCEDURE — 82306 VITAMIN D 25 HYDROXY: CPT

## 2025-03-10 PROCEDURE — 90471 IMMUNIZATION ADMIN: CPT | Mod: S$GLB,,,

## 2025-03-10 PROCEDURE — 90656 IIV3 VACC NO PRSV 0.5 ML IM: CPT | Mod: S$GLB,,,

## 2025-03-10 PROCEDURE — 3051F HG A1C>EQUAL 7.0%<8.0%: CPT | Mod: CPTII,S$GLB,,

## 2025-03-10 PROCEDURE — 3008F BODY MASS INDEX DOCD: CPT | Mod: CPTII,S$GLB,,

## 2025-03-10 RX ORDER — LIDOCAINE 50 MG/G
1 PATCH TOPICAL DAILY PRN
Qty: 30 PATCH | Refills: 0 | Status: SHIPPED | OUTPATIENT
Start: 2025-03-10

## 2025-03-10 NOTE — PROGRESS NOTES
Family Medicine     Patient name: Joanie Chandler  MRN: [id number]  : 1960  PCP NAME: Katerina Saunders MD    Active Problem List with Overview Notes    Diagnosis Date Noted    Chronic right shoulder pain 03/10/2025    Severe obesity (BMI 35.0-39.9) with comorbidity 2024    Abnormal CT lung screening 10/20/2023    Solitary pulmonary nodule 10/20/2023    Seasonal allergic rhinitis due to pollen 10/20/2023    Refractive error 12/10/2021    Vaginal candidiasis 2020    Hyperlipidemia LDL goal <70 2020    Type 2 diabetes mellitus with hyperglycemia, without long-term current use of insulin 2020    Essential hypertension 2020    Pain of both breasts 2020    Heart murmur 2020    Arthritis of ankle 2020    Vitamin D deficiency 2020       History of Present Illness    Patient presents today for a physical check up    She reports chronic shoulder pain that interferes with sleep, particularly when turning in bed or positioning her shoulder behind her neck. She has received multiple injections for pain relief from both her primary care physician (2) and a specialist (1), which were ineffective. She is currently attending physical therapy, with her last session two weeks ago.     She is currently taking Ozempic and Metformin 1000mg twice daily for diabetes management and is followed by a diabetic specialist. A1C has been trending down slowly.    She has a history of tuberculosis from her youth and lung nodules requiring yearly monitoring. CT Scan showed stable lung nodules compared to August.    She did not take her blood pressure medication today due to scheduled appointment.    She generally reports good sleep quality, though it is currently disrupted by shoulder pain.    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    She has never had a colonoscopy.      ROS:  General: -fever, -chills, -fatigue, -weight gain, -weight  loss  Eyes: -vision changes, -redness, -discharge  ENT: -ear pain, -nasal congestion, -sore throat  Cardiovascular: -chest pain, -palpitations, -lower extremity edema  Respiratory: -cough, -shortness of breath  Gastrointestinal: -abdominal pain, -nausea, -vomiting, -diarrhea, -constipation, -blood in stool  Genitourinary: -dysuria, -hematuria, -frequency  Musculoskeletal: -joint pain, -muscle pain  Skin: -rash, -lesion  Neurological: -headache, -dizziness, -numbness, -tingling  Psychiatric: -anxiety, -depression, +sleep difficulty          Past Medical History:   Diagnosis Date    Allergy     Arthritis     Blood clot in vein     Diabetes mellitus     Diabetes mellitus, type 2     Hyperlipidemia     Hypertension     Uncontrolled type 2 diabetes mellitus with hyperglycemia 2/14/2020       Past Surgical History:   Procedure Laterality Date    HYSTERECTOMY          Family History   Problem Relation Name Age of Onset    Diabetes Mother      Arthritis Mother      Breast cancer Sister      Cataracts Sister      Breast cancer Sister      Cataracts Maternal Uncle          Social History     Socioeconomic History    Marital status: Single   Tobacco Use    Smoking status: Never    Smokeless tobacco: Never   Substance and Sexual Activity    Alcohol use: No    Drug use: No     Social Drivers of Health     Financial Resource Strain: Patient Declined (8/15/2024)    Overall Financial Resource Strain (CARDIA)     Difficulty of Paying Living Expenses: Patient declined   Food Insecurity: No Food Insecurity (8/15/2024)    Hunger Vital Sign     Worried About Running Out of Food in the Last Year: Never true     Ran Out of Food in the Last Year: Never true   Physical Activity: Unknown (8/15/2024)    Exercise Vital Sign     Days of Exercise per Week: 5 days   Stress: Stress Concern Present (8/15/2024)    [employer] - Occupational Stress Questionnaire     Feeling of Stress : Very much   Housing Stability:  "Unknown (8/15/2024)    Housing Stability Vital Sign     Unable to Pay for Housing in the Last Year: No       BP (!) 158/92   Pulse 66   Temp 98 °F (36.7 °C) (Oral)   Resp 18   Ht 4' 11" (1.499 m)   Wt 74.6 kg (164 lb 7.4 oz)   SpO2 99%   BMI 33.22 kg/m²     Physical Exam    General: No acute distress. Well-developed. Well-nourished.  Eyes: EOMI. Sclerae anicteric.  HENT: Normocephalic. Atraumatic. Nares patent. Moist oral mucosa.  Ears: Bilateral TMs clear. Bilateral EACs clear.  Cardiovascular: Regular rate. Regular rhythm. No murmurs. No rubs. No gallops. Normal S1, S2.  Respiratory: Normal respiratory effort. Clear to auscultation bilaterally. No rales. No rhonchi. No wheezing.  Abdomen: Soft. Non-tender. Non-distended. Normoactive bowel sounds.  Musculoskeletal: No  obvious deformity.  Extremities: No lower extremity edema.  Neurological: Alert & oriented x3. No slurred speech. Normal gait.  Psychiatric: Normal mood. Normal affect. Good insight. Good judgment.  Skin: Warm. Dry. No rash.            Assessment & Plan    IMPRESSION:  - Assessed shoulder pain; physical therapy and corticosteroid injections have been ineffective  - Evaluated diabetes management; noted slow improvement in A1c levels (7.7 to 7.5)  - Considered hypertension management; unable to assess efficacy due to patient not taking medication on day of visit  - Reviewed vitamin D deficiency history from 4-5 years ago; pending current lab results  - Noted stable lung nodules; deferred further imaging at this time    SEVERE OBESITY (BMI 35.0-39.9) WITH COMORBIDITY:  - Monitor A1c levels (currently decreasing from 7.7 to 7.5) and blood pressure.  - Continue Zempic 2mg and Metformin 1g twice daily for diabetes management.  - Reassess blood pressure in 2 weeks, noting the current high reading is due to missed medication.    DIABETES:  - Order diabetic eye exam.  - Acknowledge patient is under care of a diabetic specialist.    HYPERTENSION:  - " Discuss importance of consistent medication adherence for accurate assessment.  - Educate patient on potential connection between shoulder pain and elevated blood pressure.    SHOULDER PAIN:  - Continue naproxen for pain management (current dose).  - Prescribe Lidocaine patches for additional pain relief.  - Explain variability in effectiveness of corticosteroid injections.  - Encourage continuation of physical therapy.  - Acknowledge persistent shoulder pain affecting sleep and previous diagnosis of muscle overuse, not arthritis.    LEG AND MUSCLE PAIN:  - Evaluate leg and muscle pain, possibly related to work or medication.  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  - Recommend using support staff to reduce walking and consider reducing steps per day.  - Suggest patient massage foot and knee to address muscle pain.    LUNG SPOTS:  - Review previous imaging results, noting no changes.  - Acknowledge patient's history of tuberculosis as possible cause of lung spots.  - Suggest waiting before next check-up.    VITAMIN D DEFICIENCY:  - Check vitamin D levels as they were low in the past.  - Consider prescribing vitamin D if levels are low.  - Patient to contact the office if levels are low for potential supplementation.             Joanie was seen today for establish care and annual exam.    Diagnoses and all orders for this visit:    Annual physical exam  -     CBC Auto Differential; Future  -     TSH; Future    Encounter for screening mammogram for breast cancer  -     Mammo Digital Screening Bilat; Future    Encounter for colorectal cancer screening  -     Ambulatory referral/consult to Endo Procedure ; Future    Needs flu shot  -     influenza (Flulaval, Fluzone, Fluarix) 45 mcg/0.5 mL IM vaccine (> or = 6 mo) 0.5 mL    Type 2 diabetes mellitus with hyperglycemia, without long-term current use of insulin  -     Ambulatory referral/consult  to Optometry; Future  -     Lipid Panel; Future    Vitamin D deficiency  -     Vitamin D 25 hydroxy; Future    Essential hypertension  BP Readings from Last 3 Encounters:   03/10/25 (!) 158/92   02/11/25 135/80   10/10/24 120/64     Patient's blood pressure control is poorly controlled today.The benefits of comprehensive lifestyle modification were discussed. Patient is encouraged to continue to restrict the quantity of salt in diet and engage in regular aerobic/resistance exercise  Schedule follow up in 2 weeks for blood pressure check with office nurses.   -     Comprehensive Metabolic Panel; Future    Chronic right shoulder pain  -     LIDOcaine (LIDODERM) 5 %; Place 1 patch onto the skin daily as needed (shoulder pain). Remove & Discard patch within 12 hours or as directed by MD         Problem List Items Addressed This Visit       Type 2 diabetes mellitus with hyperglycemia, without long-term current use of insulin    Relevant Orders    Ambulatory referral/consult to Optometry    Lipid Panel    Essential hypertension    Relevant Orders    Comprehensive Metabolic Panel    Vitamin D deficiency    Relevant Orders    Vitamin D 25 hydroxy    Chronic right shoulder pain    Relevant Medications    LIDOcaine (LIDODERM) 5 %     Other Visit Diagnoses         Annual physical exam    -  Primary    Relevant Orders    CBC Auto Differential    TSH      Encounter for screening mammogram for breast cancer        Relevant Orders    Mammo Digital Screening Bilat      Encounter for colorectal cancer screening        Relevant Orders    Ambulatory referral/consult to Endo Procedure       Needs flu shot        Relevant Medications    influenza (Flulaval, Fluzone, Fluarix) 45 mcg/0.5 mL IM vaccine (> or = 6 mo) 0.5 mL (Completed)              Medication List with Changes/Refills   New Medications    LIDOCAINE (LIDODERM) 5 %    Place 1 patch onto the skin daily as needed (shoulder pain). Remove & Discard patch within 12 hours  or as directed by MD   Current Medications    AZELASTINE (ASTELIN) 137 MCG (0.1 %) NASAL SPRAY    2 sprays (274 mcg total) by Nasal route 2 (two) times daily.    BLOOD SUGAR DIAGNOSTIC STRP    Check blood glucose 2 times a day    BLOOD-GLUCOSE METER KIT    Test glucose 2x/day    DICLOFENAC SODIUM (VOLTAREN ARTHRITIS PAIN) 1 % GEL    Apply 2 g topically 2 (two) times daily as needed (pain).    FLUTICASONE PROPIONATE (FLONASE) 50 MCG/ACTUATION NASAL SPRAY    1 spray (50 mcg total) by Each Nostril route once daily.    FLUTICASONE PROPIONATE (FLONASE) 50 MCG/ACTUATION NASAL SPRAY    1 spray (50 mcg total) by Each Nostril route once daily.    LANCETS MISC    Check blood glucose 2x/day    LANCING DEVICE MISC    One device, used to check blood glucose, ICD-10: E11.9    LEVOCETIRIZINE (XYZAL) 5 MG TABLET    Take 1 tablet (5 mg total) by mouth every evening.    METFORMIN (GLUCOPHAGE-XR) 500 MG ER 24HR TABLET    Take 1 tablet (500 mg total) by mouth 2 (two) times daily with meals.    NAPROXEN (NAPROSYN) 500 MG TABLET    Take 1 tablet (500 mg total) by mouth 2 (two) times daily.    ROSUVASTATIN (CRESTOR) 20 MG TABLET    Take 1 tablet (20 mg total) by mouth once daily.    SEMAGLUTIDE (OZEMPIC) 2 MG/DOSE (8 MG/3 ML) PNIJ    Inject 2 mg into the skin every 7 days.    VALSARTAN (DIOVAN) 80 MG TABLET    Take 1 tablet (80 mg total) by mouth once daily.   Discontinued Medications    CHLORHEXIDINE (PERIDEX) 0.12 % SOLUTION    15 mLs 2 (two) times daily.         No follow-ups on file.    Katerina Saunders MD        This note was generated with the assistance of ambient listening technology. Verbal consent was obtained by the patient and accompanying visitor(s) for the recording of patient appointment to facilitate this note. I attest to having reviewed and edited the generated note for accuracy, though some syntax or spelling errors may persist. Please contact the author of this note for any clarification.

## 2025-03-10 NOTE — PROGRESS NOTES
Health Maintenance Due   Topic     TETANUS VACCINE  Pt decline    Colorectal Cancer Screening  Information received.      Shingles Vaccine (1 of 2) Pt decline    RSV Vaccine (Age 60+ and Pregnant patients) (1 - Risk 60-74 years 1-dose series) Not offered at this Facility    Diabetic Eye Exam  Consult pcp    Influenza Vaccine (1) Pt agree to get today    COVID-19 Vaccine (4 - 2024-25 season) Not offered at this Facility    Mammogram  Scheduled     Diabetes Urine Screening  Consult pcp    Lipid Panel  Consult pcp

## 2025-03-11 ENCOUNTER — RESULTS FOLLOW-UP (OUTPATIENT)
Dept: FAMILY MEDICINE | Facility: CLINIC | Age: 65
End: 2025-03-11

## 2025-03-11 DIAGNOSIS — E55.9 VITAMIN D DEFICIENCY: Primary | ICD-10-CM

## 2025-03-11 RX ORDER — ERGOCALCIFEROL 1.25 MG/1
50000 CAPSULE ORAL
Qty: 8 CAPSULE | Refills: 0 | Status: SHIPPED | OUTPATIENT
Start: 2025-03-11 | End: 2025-05-14

## 2025-03-15 ENCOUNTER — HOSPITAL ENCOUNTER (EMERGENCY)
Facility: HOSPITAL | Age: 65
Discharge: HOME | End: 2025-03-15
Attending: STUDENT IN AN ORGANIZED HEALTH CARE EDUCATION/TRAINING PROGRAM
Payer: COMMERCIAL

## 2025-03-15 VITALS
WEIGHT: 164 LBS | OXYGEN SATURATION: 99 % | RESPIRATION RATE: 20 BRPM | BODY MASS INDEX: 33.06 KG/M2 | HEART RATE: 85 BPM | TEMPERATURE: 98 F | SYSTOLIC BLOOD PRESSURE: 138 MMHG | DIASTOLIC BLOOD PRESSURE: 62 MMHG | HEIGHT: 59 IN

## 2025-03-15 DIAGNOSIS — R00.0 RAPID HEART BEAT: ICD-10-CM

## 2025-03-15 DIAGNOSIS — K21.9 GASTROESOPHAGEAL REFLUX DISEASE, UNSPECIFIED WHETHER ESOPHAGITIS PRESENT: Primary | ICD-10-CM

## 2025-03-15 LAB
GLUCOSE SERPL-MCNC: 163 MG/DL (ref 70–110)
POCT GLUCOSE: 163 MG/DL (ref 70–110)

## 2025-03-15 PROCEDURE — 25000003 PHARM REV CODE 250: Performed by: STUDENT IN AN ORGANIZED HEALTH CARE EDUCATION/TRAINING PROGRAM

## 2025-03-15 PROCEDURE — 93005 ELECTROCARDIOGRAM TRACING: CPT

## 2025-03-15 PROCEDURE — 82962 GLUCOSE BLOOD TEST: CPT

## 2025-03-15 PROCEDURE — 99283 EMERGENCY DEPT VISIT LOW MDM: CPT | Mod: 25

## 2025-03-15 PROCEDURE — 93010 ELECTROCARDIOGRAM REPORT: CPT | Mod: ,,, | Performed by: INTERNAL MEDICINE

## 2025-03-15 RX ORDER — LIDOCAINE HYDROCHLORIDE 20 MG/ML
15 SOLUTION OROPHARYNGEAL ONCE
Status: COMPLETED | OUTPATIENT
Start: 2025-03-15 | End: 2025-03-15

## 2025-03-15 RX ORDER — PANTOPRAZOLE SODIUM 20 MG/1
20 TABLET, DELAYED RELEASE ORAL DAILY
Qty: 14 TABLET | Refills: 0 | Status: SHIPPED | OUTPATIENT
Start: 2025-03-15 | End: 2025-03-29

## 2025-03-15 RX ORDER — ALUMINUM HYDROXIDE, MAGNESIUM HYDROXIDE, AND SIMETHICONE 1200; 120; 1200 MG/30ML; MG/30ML; MG/30ML
30 SUSPENSION ORAL ONCE
Status: COMPLETED | OUTPATIENT
Start: 2025-03-15 | End: 2025-03-15

## 2025-03-15 RX ADMIN — ALUMINUM HYDROXIDE, MAGNESIUM HYDROXIDE, AND DIMETHICONE 30 ML: 200; 20; 200 SUSPENSION ORAL at 02:03

## 2025-03-15 RX ADMIN — LIDOCAINE HYDROCHLORIDE 15 ML: 20 SOLUTION ORAL at 02:03

## 2025-03-15 NOTE — DISCHARGE INSTRUCTIONS
Thank you for coming to our Emergency Department today. It is important to remember that some problems are difficult to diagnose and may not be found during your first visit. Be sure to follow up with your primary care doctor and review any labs/imaging that was performed with them. If you do not have a primary care doctor, you may contact the one listed on your discharge paperwork or you may also call the [facility] Appointment Desk at [phone] to schedule an appointment with one.     All medications may potentially have side effects and it is impossible to predict which medications may give you side effects. If you feel that you are having a negative effect of any medication you should immediately stop taking them and seek medical attention.    Return to the ER with any questions/concerns, new/concerning symptoms, worsening or failure to improve. Do not drive or make any important decisions for 24 hours if you have received any pain medications, sedatives or mood altering drugs during your ER visit.

## 2025-03-15 NOTE — ED PROVIDER NOTES
Encounter Date: 3/15/2025       History     Chief Complaint   Patient presents with    Tachycardia     Pt states feels like heart is racing and burinng in chest and indigestion. Pt states no hx of cardiac problems. Pt with HR of 89 in triage. Pt states no pain     64 y.o. female who has a past medical history of Allergy, Arthritis, Blood clot in vein, Diabetes mellitus, Diabetes mellitus, type 2, Hyperlipidemia, Hypertension, and Uncontrolled type 2 diabetes mellitus with hyperglycemia. presents to the emergency department due to  epigastric/substernal burning like pain.  Patient reports symptoms have been ongoing for the past 3 days but today started having palpitations.  Denies any associated shortness of breath.  Denies any exertional pain.  Patient reports feeling she is having mild indigestion.  Pain is not associated with any meals.  Denies any lower extremity swelling.  Does not have any urinary bowel complaints.    The history is provided by the patient.     Review of patient's allergies indicates:  No Known Allergies  Past Medical History:   Diagnosis Date    Allergy     Arthritis     Blood clot in vein     Diabetes mellitus     Diabetes mellitus, type 2     Hyperlipidemia     Hypertension     Uncontrolled type 2 diabetes mellitus with hyperglycemia 2/14/2020     Past Surgical History:   Procedure Laterality Date    HYSTERECTOMY       Family History   Problem Relation Name Age of Onset    Diabetes Mother      Arthritis Mother      Breast cancer Sister      Cataracts Sister      Breast cancer Sister      Cataracts Maternal Uncle       Social History[1]  Review of Systems   Constitutional:  Negative for fever.   HENT:  Negative for sore throat.    Respiratory:  Negative for shortness of breath.    Cardiovascular:  Negative for chest pain.   Gastrointestinal:  Negative for nausea.   Genitourinary:  Negative for dysuria.   Musculoskeletal:  Negative for back pain.   Skin:  Negative for rash.   Neurological:   Negative for weakness.   Hematological:  Does not bruise/bleed easily.       Physical Exam     Initial Vitals [03/15/25 0155]   BP Pulse Resp Temp SpO2   (!) 140/65 86 18 98.1 °F (36.7 °C) 98 %      MAP       --         Physical Exam    Nursing note and vitals reviewed.  Constitutional: She is not diaphoretic. No distress.   HENT:   Head: Normocephalic and atraumatic.   Eyes: Conjunctivae and EOM are normal. Pupils are equal, round, and reactive to light.   Neck:   Normal range of motion.  Pulmonary/Chest: Breath sounds normal. No respiratory distress.   Abdominal: Abdomen is soft. Bowel sounds are normal. She exhibits no distension. There is no abdominal tenderness.   Musculoskeletal:         General: No tenderness. Normal range of motion.      Cervical back: Normal range of motion.     Neurological: She is alert.   Skin: Skin is warm. Capillary refill takes less than 2 seconds.   Psychiatric: Her behavior is normal.         ED Course   Procedures  Labs Reviewed   POCT GLUCOSE - Abnormal       Result Value    POCT Glucose 163 (*)    POCT GLUCOSE MONITORING CONTINUOUS     EKG Readings: (Independently Interpreted)   Independent Interpretation of EKG:  Rhythm: Sinus   Rate: 84  QTC: 404  No STEMI        Imaging Results    None          Medications   aluminum-magnesium hydroxide-simethicone 200-200-20 mg/5 mL suspension 30 mL (30 mLs Oral Given 3/15/25 0235)     And   LIDOcaine viscous HCl 2% oral solution 15 mL (15 mLs Oral Given 3/15/25 0235)     Medical Decision Making:   Initial Assessment:   64 y.o. female who has a past medical history of Allergy, Arthritis, Blood clot in vein, Diabetes mellitus, Diabetes mellitus, type 2, Hyperlipidemia, Hypertension, and Uncontrolled type 2 diabetes mellitus with hyperglycemia. presents to the emergency department due to  epigastric/substernal burning like pain.  Abdominal exam without peritoneal signs. No evidence of acute abdomen at this time. Well appearing. Low suspicion  for acute hepatobiliary disease (includng acute cholecystitis), acute pancreatitis, PUD (including perforation), acute infectious processes (pneumonia, hepatitis, pyelonephritis), atypical appendicitis, vascular catastrophe, bowel obstruction or viscus perforation. Presentation not consistent with other acute, emergent causes of abdominal pain at this time.    Differential Diagnosis:   Differential Diagnosis includes, but is not limited to:  ACS/MI, PE, aortic dissection, pneumothorax, cardiac tamponade, pericarditis/myocarditis, pneumonia, infection/abscess, lung mass, trauma/fracture, costochondritis/pleurisy, MSK pain/contusion, GERD, biliary disease, pancreatitis, anemia              ED Course as of 03/15/25 0323   Sat Mar 15, 2025   0321 Patient reports complete resolution of her symptoms after GI cocktail. [AS]   0322 Pt is currently stable for discharge. I see no indication of an emergent process beyond that addressed during our encounter but have duly counseled the patient/family regarding the need for prompt follow-up as well as the indications that should prompt immediate return to the emergency room should new or worrisome developments occur. I discussed the ED work up and diagnostic findings with the patient/family. The patient/family has been provided with verbal and printed direction regarding our final diagnosis(es) as well as instructions regarding use of OTC and/or Rx medications intended to manage the patient's aforementioned conditions. The patient/family verbalized an understanding. The patient/family is asked if there are any questions or concerns. We discuss the case, until all issues are addressed to the patient/family's satisfaction. Patient/family understands and is agreeable to the plan.  [AS]      ED Course User Index  [AS] Pardeep Zaragoza MD          Medical Decision Making  Risk  OTC drugs.  Prescription drug management.           Clinical Impression:   Final diagnoses:  [R00.0] Rapid  heart beat  [K21.9] Gastroesophageal reflux disease, unspecified whether esophagitis present (Primary)          ED Disposition Condition    Discharge Stable          ED Prescriptions       Medication Sig Dispense Start Date End Date Auth. Provider    pantoprazole (PROTONIX) 20 MG tablet Take 1 tablet (20 mg total) by mouth once daily. for 14 days 14 tablet 3/15/2025 3/29/2025 Pardeep Zaragoza MD          Follow-up Information       Follow up With Specialties Details Why Contact Info    Katerina Saunders MD Internal Medicine, Family Medicine, Hospitalist Schedule an appointment as soon as possible for a visit  for reassesment [address] [place] [address]  [phone]      [facility] Emergency Medicine  If symptoms worsen [address] [facility] [place] [id number] [phone]            DISCLAIMER: This note was prepared with [employer] voice recognition transcription software. Garbled syntax, mangled pronouns, and other bizarre constructions may be attributed to that software system.         [1]   Social History  Tobacco Use    Smoking status: Never    Smokeless tobacco: Never   Substance Use Topics    Alcohol use: No    Drug use: No        Pardeep Zaragoza MD  03/15/25 0323

## 2025-03-16 LAB
OHS QRS DURATION: 78 MS
OHS QTC CALCULATION: 404 MS

## 2025-03-21 ENCOUNTER — HOSPITAL ENCOUNTER (OUTPATIENT)
Dept: RADIOLOGY | Facility: HOSPITAL | Age: 65
Discharge: HOME | End: 2025-03-21
Payer: COMMERCIAL

## 2025-03-21 DIAGNOSIS — Z12.31 ENCOUNTER FOR SCREENING MAMMOGRAM FOR BREAST CANCER: ICD-10-CM

## 2025-03-21 PROCEDURE — 77067 SCR MAMMO BI INCL CAD: CPT | Mod: 26,,, | Performed by: RADIOLOGY

## 2025-03-21 PROCEDURE — 77063 BREAST TOMOSYNTHESIS BI: CPT | Mod: 26,,, | Performed by: RADIOLOGY

## 2025-03-21 PROCEDURE — 77067 SCR MAMMO BI INCL CAD: CPT | Mod: TC

## 2025-03-24 ENCOUNTER — OFFICE VISIT (OUTPATIENT)
Dept: FAMILY MEDICINE | Facility: CLINIC | Age: 65
End: 2025-03-24
Payer: COMMERCIAL

## 2025-03-24 ENCOUNTER — CLINICAL SUPPORT (OUTPATIENT)
Dept: FAMILY MEDICINE | Facility: CLINIC | Age: 65
End: 2025-03-24
Payer: COMMERCIAL

## 2025-03-24 VITALS
HEART RATE: 75 BPM | TEMPERATURE: 97 F | WEIGHT: 160.69 LBS | HEIGHT: 59 IN | SYSTOLIC BLOOD PRESSURE: 134 MMHG | RESPIRATION RATE: 16 BRPM | OXYGEN SATURATION: 99 % | DIASTOLIC BLOOD PRESSURE: 72 MMHG | BODY MASS INDEX: 32.4 KG/M2

## 2025-03-24 VITALS — SYSTOLIC BLOOD PRESSURE: 132 MMHG | DIASTOLIC BLOOD PRESSURE: 70 MMHG

## 2025-03-24 DIAGNOSIS — J30.1 SEASONAL ALLERGIC RHINITIS DUE TO POLLEN: ICD-10-CM

## 2025-03-24 DIAGNOSIS — I10 ESSENTIAL HYPERTENSION: Primary | ICD-10-CM

## 2025-03-24 DIAGNOSIS — E11.65 TYPE 2 DIABETES MELLITUS WITH HYPERGLYCEMIA, WITHOUT LONG-TERM CURRENT USE OF INSULIN: ICD-10-CM

## 2025-03-24 DIAGNOSIS — J01.90 ACUTE SINUSITIS, RECURRENCE NOT SPECIFIED, UNSPECIFIED LOCATION: Primary | ICD-10-CM

## 2025-03-24 DIAGNOSIS — K21.9 GASTROESOPHAGEAL REFLUX DISEASE, UNSPECIFIED WHETHER ESOPHAGITIS PRESENT: ICD-10-CM

## 2025-03-24 PROCEDURE — 3075F SYST BP GE 130 - 139MM HG: CPT | Mod: CPTII,S$GLB,,

## 2025-03-24 PROCEDURE — 3078F DIAST BP <80 MM HG: CPT | Mod: CPTII,S$GLB,,

## 2025-03-24 PROCEDURE — 99214 OFFICE O/P EST MOD 30 MIN: CPT | Mod: S$GLB,,,

## 2025-03-24 PROCEDURE — G2211 COMPLEX E/M VISIT ADD ON: HCPCS | Mod: S$GLB,,,

## 2025-03-24 PROCEDURE — 3051F HG A1C>EQUAL 7.0%<8.0%: CPT | Mod: CPTII,S$GLB,,

## 2025-03-24 PROCEDURE — 99999 PR PBB SHADOW E&M-EST. PATIENT-LVL I: CPT | Mod: PBBFAC,,,

## 2025-03-24 PROCEDURE — 1159F MED LIST DOCD IN RCRD: CPT | Mod: CPTII,S$GLB,,

## 2025-03-24 PROCEDURE — 4010F ACE/ARB THERAPY RXD/TAKEN: CPT | Mod: CPTII,S$GLB,,

## 2025-03-24 PROCEDURE — 99999 PR PBB SHADOW E&M-EST. PATIENT-LVL IV: CPT | Mod: PBBFAC,,,

## 2025-03-24 PROCEDURE — 3008F BODY MASS INDEX DOCD: CPT | Mod: CPTII,S$GLB,,

## 2025-03-24 RX ORDER — PANTOPRAZOLE SODIUM 20 MG/1
20 TABLET, DELAYED RELEASE ORAL DAILY
Qty: 90 TABLET | Refills: 0 | Status: SHIPPED | OUTPATIENT
Start: 2025-03-24 | End: 2025-06-22

## 2025-03-24 RX ORDER — PANTOPRAZOLE SODIUM 20 MG/1
20 TABLET, DELAYED RELEASE ORAL DAILY
Qty: 90 TABLET | Refills: 0 | Status: SHIPPED | OUTPATIENT
Start: 2025-03-24 | End: 2025-03-24

## 2025-03-24 RX ORDER — LEVOCETIRIZINE DIHYDROCHLORIDE 5 MG/1
5 TABLET, FILM COATED ORAL NIGHTLY
Qty: 30 TABLET | Refills: 11 | Status: SHIPPED | OUTPATIENT
Start: 2025-03-24 | End: 2026-03-24

## 2025-03-24 RX ORDER — FLUTICASONE PROPIONATE 50 MCG
1 SPRAY, SUSPENSION (ML) NASAL DAILY
Qty: 16 ML | Refills: 11 | Status: SHIPPED | OUTPATIENT
Start: 2025-03-24

## 2025-03-24 RX ORDER — PROMETHAZINE HYDROCHLORIDE AND DEXTROMETHORPHAN HYDROBROMIDE 6.25; 15 MG/5ML; MG/5ML
5 SYRUP ORAL EVERY 4 HOURS PRN
Qty: 118 ML | Refills: 0 | Status: SHIPPED | OUTPATIENT
Start: 2025-03-24 | End: 2025-04-03

## 2025-03-24 RX ORDER — AZELASTINE 1 MG/ML
2 SPRAY, METERED NASAL 2 TIMES DAILY
Qty: 30 ML | Refills: 11 | Status: SHIPPED | OUTPATIENT
Start: 2025-03-24 | End: 2026-03-24

## 2025-03-24 NOTE — PROGRESS NOTES
Family Medicine      Patient Name: Joanie Chandler  MRN: [id number]  : 1960    PCP: Katerina Saunders MD       HPI      Joanie Chandler is a 64 y.o. female with multiple medical diagnoses as listed in the medical history and problem list that presents for   Chief Complaint   Patient presents with    Sinus Problem       HPI    History of Present Illness    Patient presents today for sinus problems. She presents with runny eyes, sore throat, coughing, and light headache that started on Friday. She reports clear drainage and runny nose. She denies exposure to sick contacts, noting symptoms started spontaneously. She has a history of allergies previously managed with Astelin nasal spray, Flonase, and Xyzal. She reports being currently out of all allergy medications. She reports a recent emergency room visit for rapid heart rate. While there, she was diagnosed with GERD which her sister suggested a possible connection between to Ozempic use. She denies any other symptoms related to this episode. She completed a mammogram last Friday and recently received a flu vaccine. She has an upcoming eye appointment soon.      ROS:  General: -fever, -chills, -fatigue, -weight gain, -weight loss  Eyes: -vision changes, -redness, +discharge  ENT: -ear pain, -nasal congestion, +sore throat, +post nasal drip, +runny nose, +nasal discharge  Cardiovascular: -chest pain, +palpitations, -lower extremity edema, +feelings of fast heart rate  Respiratory: +cough, -shortness of breath, +sputum production, +productive cough  Gastrointestinal: -abdominal pain, -nausea, -vomiting, -diarrhea, -constipation, -blood in stool  Genitourinary: -dysuria, -hematuria, -frequency  Musculoskeletal: -joint pain, -muscle pain  Skin: -rash, -lesion  Neurological: +headache, -dizziness, -numbness, -tingling  Psychiatric: -anxiety, -depression, -sleep difficulty  Head: +head pain              History      Past Medical History:  Past Medical  History:   Diagnosis Date    Allergy     Arthritis     Blood clot in vein     Diabetes mellitus     Diabetes mellitus, type 2     Hyperlipidemia     Hypertension     Uncontrolled type 2 diabetes mellitus with hyperglycemia 2/14/2020       Past Surgical History:  Past Surgical History:   Procedure Laterality Date    HYSTERECTOMY         Social History:  Social History[1]    Family History:  Family History   Problem Relation Name Age of Onset    Diabetes Mother      Arthritis Mother      Breast cancer Sister      Cataracts Sister      Breast cancer Sister      Cataracts Maternal Uncle         Allergies and Medications: (updated and reviewed)  Review of patient's allergies indicates:  No Known Allergies  Current Medications[2]    Patient Care Team:  Katerina Saunders MD as PCP - General (Internal Medicine)  Sushma Rowell PA-C as Physician Assistant (Internal Medicine)         Exam      Review of Systems:  (as noted above)      Physical Exam:  Physical Exam  Vitals reviewed.   Constitutional:       General: She is not in acute distress.     Appearance: Normal appearance. She is normal weight. She is not ill-appearing.   HENT:      Head: Normocephalic and atraumatic.      Nose: Rhinorrhea present. No congestion.   Eyes:      Extraocular Movements: Extraocular movements intact.      Pupils: Pupils are equal, round, and reactive to light.   Cardiovascular:      Rate and Rhythm: Normal rate.      Pulses: Normal pulses.   Pulmonary:      Effort: Pulmonary effort is normal.   Abdominal:      General: Abdomen is flat.   Musculoskeletal:         General: Normal range of motion.      Cervical back: Normal range of motion.   Neurological:      Mental Status: She is alert and oriented to person, place, and time. Mental status is at baseline.   Psychiatric:         Mood and Affect: Mood normal.         Behavior: Behavior normal.       Vitals:    03/24/25 1029   BP: 134/72   BP Location: Left arm   Patient Position: Sitting  "  Pulse: 75   Resp: 16   Temp: 97.1 °F (36.2 °C)   TempSrc: Temporal   SpO2: 99%   Weight: 72.9 kg (160 lb 11.5 oz)   Height: 4' 11" (1.499 m)      Body mass index is 32.46 kg/m².             Assessment & Plan      1. Acute sinusitis, recurrence not specified, unspecified location  - promethazine-dextromethorphan (PROMETHAZINE-DM) 6.25-15 mg/5 mL Syrp; Take 5 mLs by mouth every 4 (four) hours as needed (cough and congestion).  Dispense: 118 mL; Refill: 0    2. Seasonal allergic rhinitis due to pollen  - azelastine (ASTELIN) 137 mcg (0.1 %) nasal spray; 2 sprays (274 mcg total) by Nasal route 2 (two) times daily.  Dispense: 30 mL; Refill: 11  - fluticasone propionate (FLONASE) 50 mcg/actuation nasal spray; 1 spray (50 mcg total) by Each Nostril route once daily.  Dispense: 16 mL; Refill: 11  - levocetirizine (XYZAL) 5 MG tablet; Take 1 tablet (5 mg total) by mouth every evening.  Dispense: 30 tablet; Refill: 11    3. Type 2 diabetes mellitus with hyperglycemia, without long-term current use of insulin    4. Gastroesophageal reflux disease, unspecified whether esophagitis present  - pantoprazole (PROTONIX) 20 MG tablet; Take 1 tablet (20 mg total) by mouth once daily.  Dispense: 90 tablet; Refill: 0           Assessment & Plan    IMPRESSION:  - Assessed sinus problems, including runny eyes, sore throat, coughing, and light headache.  - Considered possibility of sinus infection due to persistent symptoms.  - Determined need to address allergy symptoms and drying up mucus.  - Evaluated recent ER visit for rapid heartbeat.  - Reviewed current Pantoprazole prescription for possible acid reflux symptoms related to Ozempic use.    ALLERGIC RHINITIS:  - Prescribed Astiline nasal spray, Flonase, and Xyzal for allergies.  - Instructed the patient to spray nasal medications towards the outside of the nose and breathe in during application.  - Noted patient's history of allergies and current symptoms including sinus problems, " epiphora, pharyngitis, cough, mild cephalgia, and rhinorrhea with clear mucus when coughing.  - Assessed that symptoms could be developing into a sinus infection.  - Advised the patient to contact the office if symptoms persist after 1 week of using nasal sprays for potential antibiotic prescription.    T2DM:  - Continue Ozempic at current dose of 2mg weekly for weight management.  - Patient reports improvement with Ozempic.  - Discussed potential side effects, including GI problems and possible acid reflux.  - Instructed patient to report if symptoms occur or return.    TACHYCARDIA:  - Noted recent emergency room visit due to tachycardia.  - Auscultated patient's heart and lungs with normal findings.    GASTROESOPHAGEAL REFLUX DISEASE:  - Prescribed Pantoprazole 20 mg daily for 3 months.  - Scheduled follow up in 3 months to reassess the need for continued medication.  - Instructed the patient to contact the office if symptoms return while taking Pantoprazole for potential workup or dose adjustment.    COUGH:  - Prescribed cough syrup (Promethazine DM) to be taken every 4-6 hours to help with cough and clear mucus.  - Advised to contact the office if symptoms persist after 1 week of use for potential antibiotic prescription.    VACCINATION RECOMMENDATIONS:  - Recommend shingles vaccine.  - Explained the rationale for this recommendation.  - Discussed RSV vaccine recommendation for adults 60 and over to protect lungs.            --------------------------------------------      Health Maintenance:  Health Maintenance         Date Due Completion Date    TETANUS VACCINE Never done ---    Colorectal Cancer Screening Never done ---    Shingles Vaccine (1 of 2) Never done ---    RSV Vaccine (Age 60+ and Pregnant patients) (1 - Risk 60-74 years 1-dose series) Never done ---    Diabetic Eye Exam 12/10/2022 12/10/2021    COVID-19 Vaccine (4 - 2024-25 season) 09/01/2024 12/2/2021    Mammogram 10/16/2024 10/16/2023     Diabetes Urine Screening 10/17/2024 10/17/2023    Hemoglobin A1c 08/11/2025 2/11/2025    Foot Exam 02/11/2026 2/11/2025    Lipid Panel 03/10/2026 3/10/2025    Low Dose Statin 03/24/2026 3/24/2025            Discussed the importance of overdue vaccines which were offered during this encounter. Patient declined overdue vaccines at this time, Health maintenance reviewed, and Advised patient on the importance of completing overdue health maintenance items    Follow Up:  No follow-ups on file.       - The patient is given an After Visit Summary that lists all medications with directions, allergies, education, orders placed during this encounter and follow-up instructions.      - I have reviewed the patient's medical information including past medical, family, and social history sections including the medications and allergies.      - We discussed the patient's current medications.     This note was generated with the assistance of ambient listening technology. Verbal consent was obtained by the patient and accompanying visitor(s) for the recording of patient appointment to facilitate this note. I attest to having reviewed and edited the generated note for accuracy, though some syntax or spelling errors may persist. Please contact the author of this note for any clarification.      This note was created by combination of typed  and MModal dictation.  Transcription errors may be present.  If there are any questions, please contact me.     Sushma Rowell PA-C  [facility]             [1]   Social History  Socioeconomic History    Marital status: Single   Tobacco Use    Smoking status: Never    Smokeless tobacco: Never   Substance and Sexual Activity    Alcohol use: No    Drug use: No     Social Drivers of Health     Financial Resource Strain: Patient Declined (8/15/2024)    Overall Financial Resource Strain (CARDIA)     Difficulty of Paying Living Expenses: Patient declined   Food  Insecurity: No Food Insecurity (8/15/2024)    Hunger Vital Sign     Worried About Running Out of Food in the Last Year: Never true     Ran Out of Food in the Last Year: Never true   Physical Activity: Unknown (8/15/2024)    Exercise Vital Sign     Days of Exercise per Week: 5 days   Stress: Stress Concern Present (8/15/2024)    [employer] - Occupational Stress Questionnaire     Feeling of Stress : Very much   Housing Stability: Unknown (8/15/2024)    Housing Stability Vital Sign     Unable to Pay for Housing in the Last Year: No   [2]   Current Outpatient Medications   Medication Sig Dispense Refill    blood sugar diagnostic Strp Check blood glucose 2 times a day 200 strip 3    ergocalciferol (VITAMIN D2) 50,000 unit Cap Take 1 capsule (50,000 Units total) by mouth every 7 days. 8 capsule 0    LIDOcaine (LIDODERM) 5 % Place 1 patch onto the skin daily as needed (shoulder pain). Remove & Discard patch within 12 hours or as directed by MD 30 patch 0    metFORMIN (GLUCOPHAGE-XR) 500 MG ER 24hr tablet Take 1 tablet (500 mg total) by mouth 2 (two) times daily with meals. 180 tablet 2    naproxen (NAPROSYN) 500 MG tablet Take 1 tablet (500 mg total) by mouth 2 (two) times daily. 60 tablet 0    rosuvastatin (CRESTOR) 20 MG tablet Take 1 tablet (20 mg total) by mouth once daily. 90 tablet 2    semaglutide (OZEMPIC) 2 mg/dose (8 mg/3 mL) PnIj Inject 2 mg into the skin every 7 days. 9 mL 2    valsartan (DIOVAN) 80 MG tablet Take 1 tablet (80 mg total) by mouth once daily. 90 tablet 2    azelastine (ASTELIN) 137 mcg (0.1 %) nasal spray 2 sprays (274 mcg total) by Nasal route 2 (two) times daily. 30 mL 11    blood-glucose meter kit Test glucose 2x/day 1 each 0    fluticasone propionate (FLONASE) 50 mcg/actuation nasal spray 1 spray (50 mcg total) by Each Nostril route once daily. 16 mL 11    levocetirizine (XYZAL) 5 MG tablet Take 1 tablet (5 mg total) by mouth every evening. 30 tablet 11     pantoprazole (PROTONIX) 20 MG tablet Take 1 tablet (20 mg total) by mouth once daily. 90 tablet 0    promethazine-dextromethorphan (PROMETHAZINE-DM) 6.25-15 mg/5 mL Syrp Take 5 mLs by mouth every 4 (four) hours as needed (cough and congestion). 118 mL 0     No current facility-administered medications for this visit.

## 2025-03-24 NOTE — PROGRESS NOTES
Joanie Chandler 64 y.o. female is here today for Blood Pressure check.   History of HTN yes.    Review of patient's allergies indicates:  No Known Allergies  Creatinine   Date Value Ref Range Status   03/10/2025 0.8 0.5 - 1.4 mg/dL Final     Sodium   Date Value Ref Range Status   03/10/2025 138 136 - 145 mmol/L Final     Potassium   Date Value Ref Range Status   03/10/2025 4.2 3.5 - 5.1 mmol/L Final   ]  Patient verifies taking blood pressure medications on a regular basis at the same time of the day.   Current Medications[1]  Does patient have record of home blood pressure readings no. Readings have been averaging not done.   Last dose of blood pressure medication was taken at this morning.  Patient is asymptomatic.   Complains of no complaints.    Vitals:    03/24/25 0813   BP: 132/70   BP Location: Right arm   Patient Position: Sitting         Dr. Saunders informed of nurse visit.            [1]   Current Outpatient Medications:     azelastine (ASTELIN) 137 mcg (0.1 %) nasal spray, 2 sprays (274 mcg total) by Nasal route 2 (two) times daily. (Patient not taking: Reported on 3/10/2025), Disp: 30 mL, Rfl: 11    blood sugar diagnostic Strp, Check blood glucose 2 times a day, Disp: 200 strip, Rfl: 3    blood-glucose meter kit, Test glucose 2x/day, Disp: 1 each, Rfl: 0    diclofenac sodium (VOLTAREN ARTHRITIS PAIN) 1 % Gel, Apply 2 g topically 2 (two) times daily as needed (pain). (Patient not taking: Reported on 3/10/2025), Disp: 100 each, Rfl: 1    ergocalciferol (VITAMIN D2) 50,000 unit Cap, Take 1 capsule (50,000 Units total) by mouth every 7 days., Disp: 8 capsule, Rfl: 0    fluticasone propionate (FLONASE) 50 mcg/actuation nasal spray, 1 spray (50 mcg total) by Each Nostril route once daily. (Patient not taking: Reported on 3/10/2025), Disp: 16 g, Rfl: 0    fluticasone propionate (FLONASE) 50 mcg/actuation nasal spray, 1 spray (50 mcg total) by Each Nostril route once daily. (Patient not taking: Reported on  3/10/2025), Disp: 16 mL, Rfl: 11    lancets Misc, Check blood glucose 2x/day (Patient not taking: Reported on 3/10/2025), Disp: 200 each, Rfl: 3    lancing device Misc, One device, used to check blood glucose, ICD-10: E11.9 (Patient not taking: Reported on 3/10/2025), Disp: 1 each, Rfl: 0    levocetirizine (XYZAL) 5 MG tablet, Take 1 tablet (5 mg total) by mouth every evening. (Patient not taking: Reported on 3/10/2025), Disp: 30 tablet, Rfl: 11    LIDOcaine (LIDODERM) 5 %, Place 1 patch onto the skin daily as needed (shoulder pain). Remove & Discard patch within 12 hours or as directed by MD, Disp: 30 patch, Rfl: 0    metFORMIN (GLUCOPHAGE-XR) 500 MG ER 24hr tablet, Take 1 tablet (500 mg total) by mouth 2 (two) times daily with meals., Disp: 180 tablet, Rfl: 2    naproxen (NAPROSYN) 500 MG tablet, Take 1 tablet (500 mg total) by mouth 2 (two) times daily., Disp: 60 tablet, Rfl: 0    pantoprazole (PROTONIX) 20 MG tablet, Take 1 tablet (20 mg total) by mouth once daily. for 14 days, Disp: 14 tablet, Rfl: 0    rosuvastatin (CRESTOR) 20 MG tablet, Take 1 tablet (20 mg total) by mouth once daily., Disp: 90 tablet, Rfl: 2    semaglutide (OZEMPIC) 2 mg/dose (8 mg/3 mL) PnIj, Inject 2 mg into the skin every 7 days., Disp: 9 mL, Rfl: 2    valsartan (DIOVAN) 80 MG tablet, Take 1 tablet (80 mg total) by mouth once daily., Disp: 90 tablet, Rfl: 2

## 2025-03-24 NOTE — PROGRESS NOTES
Health Maintenance Due   Topic Date Due    TETANUS VACCINE  Consult pcp    Colorectal Cancer Screening  Consult pcp    Shingles Vaccine (1 of 2) Consult pcp    RSV Vaccine (Age 60+ and Pregnant patients) (1 - Risk 60-74 years 1-dose series) Consult pcp    Diabetic Eye Exam  12/10/2022    COVID-19 Vaccine (4 - 2024-25 season) 09/01/2024    Mammogram  10/16/2024    Diabetes Urine Screening  10/17/2024

## 2025-03-28 ENCOUNTER — TELEPHONE (OUTPATIENT)
Dept: PHARMACY | Facility: CLINIC | Age: 65
End: 2025-03-28
Payer: COMMERCIAL

## 2025-03-28 NOTE — TELEPHONE ENCOUNTER
[facility] Chart Review & Patient Outreach Details For Medication Adherence Project    Reason for Outreach Encounter: 3rd Party payor non-compliance report ([employer], [employer], C, etc)  2.  Patient Outreach Method: Reviewed patient chart   3.   Medication in question:    Hyperlipidemia Medications              rosuvastatin (CRESTOR) 20 MG tablet Take 1 tablet (20 mg total) by mouth once daily.                  crestor  last filled  1/7 for 90 day supply      4.  Reviewed and or Updates Made To: Patient Chart  5. Outreach Outcomes and/or actions taken: Patient filled medication and is on track to be adherent  Additional Notes:

## 2025-04-15 DIAGNOSIS — E78.5 HYPERLIPIDEMIA, UNSPECIFIED HYPERLIPIDEMIA TYPE: ICD-10-CM

## 2025-04-15 RX ORDER — ROSUVASTATIN CALCIUM 20 MG/1
20 TABLET, COATED ORAL DAILY
Qty: 90 TABLET | Refills: 0 | Status: SHIPPED | OUTPATIENT
Start: 2025-04-15

## 2025-04-22 ENCOUNTER — CLINICAL SUPPORT (OUTPATIENT)
Dept: ENDOSCOPY | Facility: HOSPITAL | Age: 65
End: 2025-04-22
Payer: COMMERCIAL

## 2025-04-22 ENCOUNTER — TELEPHONE (OUTPATIENT)
Dept: ENDOSCOPY | Facility: HOSPITAL | Age: 65
End: 2025-04-22

## 2025-04-22 VITALS — BODY MASS INDEX: 34.07 KG/M2 | HEIGHT: 59 IN | WEIGHT: 169 LBS

## 2025-04-22 DIAGNOSIS — Z12.12 ENCOUNTER FOR COLORECTAL CANCER SCREENING: ICD-10-CM

## 2025-04-22 DIAGNOSIS — Z12.11 ENCOUNTER FOR COLORECTAL CANCER SCREENING: ICD-10-CM

## 2025-04-22 DIAGNOSIS — Z12.11 SPECIAL SCREENING FOR MALIGNANT NEOPLASMS, COLON: Primary | ICD-10-CM

## 2025-04-22 NOTE — TELEPHONE ENCOUNTER
Referral for procedure from PAT appointment      Spoke to patient to schedule procedure(s) Colonoscopy       Physician to perform procedure(s) Dr. RAFIA Fields  Date of Procedure (s) 4/28/25  Arrival Time 8:45 AM  Time of Procedure(s) 9:45 AM   Location of Procedure(s) [address]   Type of Rx Prep sent to patient: PEG  Instructions provided to patient via [facility]    Patient was informed on the following information and verbalized understanding. Screening questionnaire reviewed with patient and complete. If procedure requires anesthesia, a responsible adult needs to be present to accompany the patient home, patient cannot drive after receiving anesthesia. Appointment details are tentative, especially check-in time. Patient will receive a prep-op call 7 days prior to confirm check-in time for procedure. If applicable the patient should contact their pharmacy to verify Rx for procedure prep is ready for pick-up. Patient was advised to call the scheduling department at [phone] if pharmacy states no Rx is available. Patient was advised to call the endoscopy scheduling department if any questions or concerns arise.      [facility] Scheduling Department

## 2025-04-27 ENCOUNTER — ANESTHESIA EVENT (OUTPATIENT)
Dept: ENDOSCOPY | Facility: HOSPITAL | Age: 65
End: 2025-04-27
Payer: COMMERCIAL

## 2025-04-27 RX ORDER — SODIUM CHLORIDE 9 MG/ML
INJECTION, SOLUTION INTRAVENOUS CONTINUOUS
OUTPATIENT
Start: 2025-04-27

## 2025-04-27 RX ORDER — LIDOCAINE HYDROCHLORIDE 10 MG/ML
1 INJECTION, SOLUTION EPIDURAL; INFILTRATION; INTRACAUDAL; PERINEURAL ONCE
OUTPATIENT
Start: 2025-04-27 | End: 2025-04-27

## 2025-04-28 ENCOUNTER — HOSPITAL ENCOUNTER (OUTPATIENT)
Facility: HOSPITAL | Age: 65
Discharge: HOME | End: 2025-04-28
Attending: INTERNAL MEDICINE | Admitting: INTERNAL MEDICINE
Payer: COMMERCIAL

## 2025-04-28 ENCOUNTER — ANESTHESIA (OUTPATIENT)
Dept: ENDOSCOPY | Facility: HOSPITAL | Age: 65
End: 2025-04-28
Payer: COMMERCIAL

## 2025-04-28 VITALS
SYSTOLIC BLOOD PRESSURE: 157 MMHG | RESPIRATION RATE: 12 BRPM | HEART RATE: 69 BPM | TEMPERATURE: 98 F | OXYGEN SATURATION: 100 % | DIASTOLIC BLOOD PRESSURE: 88 MMHG

## 2025-04-28 DIAGNOSIS — Z12.11 ENCOUNTER FOR COLORECTAL CANCER SCREENING: ICD-10-CM

## 2025-04-28 DIAGNOSIS — Z12.11 COLON CANCER SCREENING: Primary | ICD-10-CM

## 2025-04-28 DIAGNOSIS — Z12.12 ENCOUNTER FOR COLORECTAL CANCER SCREENING: ICD-10-CM

## 2025-04-28 LAB — POCT GLUCOSE: 149 MG/DL (ref 70–110)

## 2025-04-28 PROCEDURE — 82962 GLUCOSE BLOOD TEST: CPT | Performed by: INTERNAL MEDICINE

## 2025-04-28 PROCEDURE — 63600175 PHARM REV CODE 636 W HCPCS: Performed by: STUDENT IN AN ORGANIZED HEALTH CARE EDUCATION/TRAINING PROGRAM

## 2025-04-28 PROCEDURE — 88305 TISSUE EXAM BY PATHOLOGIST: CPT | Mod: TC,91 | Performed by: INTERNAL MEDICINE

## 2025-04-28 PROCEDURE — 45385 COLONOSCOPY W/LESION REMOVAL: CPT | Mod: 33 | Performed by: INTERNAL MEDICINE

## 2025-04-28 PROCEDURE — 25000003 PHARM REV CODE 250: Performed by: STUDENT IN AN ORGANIZED HEALTH CARE EDUCATION/TRAINING PROGRAM

## 2025-04-28 PROCEDURE — 45380 COLONOSCOPY AND BIOPSY: CPT | Mod: XS,33,, | Performed by: INTERNAL MEDICINE

## 2025-04-28 PROCEDURE — 27201089 HC SNARE, DISP (ANY): Performed by: INTERNAL MEDICINE

## 2025-04-28 PROCEDURE — 27201012 HC FORCEPS, HOT/COLD, DISP: Performed by: INTERNAL MEDICINE

## 2025-04-28 PROCEDURE — 37000008 HC ANESTHESIA 1ST 15 MINUTES: Performed by: INTERNAL MEDICINE

## 2025-04-28 PROCEDURE — 45385 COLONOSCOPY W/LESION REMOVAL: CPT | Mod: 33,,, | Performed by: INTERNAL MEDICINE

## 2025-04-28 PROCEDURE — 37000009 HC ANESTHESIA EA ADD 15 MINS: Performed by: INTERNAL MEDICINE

## 2025-04-28 RX ORDER — LIDOCAINE HYDROCHLORIDE 20 MG/ML
INJECTION INTRAVENOUS
Status: DISCONTINUED | OUTPATIENT
Start: 2025-04-28 | End: 2025-04-28

## 2025-04-28 RX ORDER — LIDOCAINE HYDROCHLORIDE 20 MG/ML
INJECTION, SOLUTION EPIDURAL; INFILTRATION; INTRACAUDAL; PERINEURAL
Status: DISCONTINUED
Start: 2025-04-28 | End: 2025-04-28 | Stop reason: HOSPADM

## 2025-04-28 RX ORDER — PROPOFOL 10 MG/ML
VIAL (ML) INTRAVENOUS
Status: DISCONTINUED
Start: 2025-04-28 | End: 2025-04-28 | Stop reason: HOSPADM

## 2025-04-28 RX ORDER — PROPOFOL 10 MG/ML
VIAL (ML) INTRAVENOUS
Status: DISCONTINUED | OUTPATIENT
Start: 2025-04-28 | End: 2025-04-28

## 2025-04-28 RX ADMIN — PROPOFOL 30 MG: 10 INJECTION, EMULSION INTRAVENOUS at 09:04

## 2025-04-28 RX ADMIN — PROPOFOL 20 MG: 10 INJECTION, EMULSION INTRAVENOUS at 09:04

## 2025-04-28 RX ADMIN — SODIUM CHLORIDE: 0.9 INJECTION, SOLUTION INTRAVENOUS at 09:04

## 2025-04-28 RX ADMIN — LIDOCAINE HYDROCHLORIDE 100 MG: 20 INJECTION, SOLUTION INTRAVENOUS at 09:04

## 2025-04-28 RX ADMIN — PROPOFOL 80 MG: 10 INJECTION, EMULSION INTRAVENOUS at 09:04

## 2025-04-28 NOTE — H&P
Short Stay Endoscopy History and Physical    PCP - Katerina Saunders MD    Procedure - Colonoscopy  ASA - per anesthesia  Mallampati - per anesthesia  History of Anesthesia problems - no  Family history Anesthesia problems - no   Plan of anesthesia - General    HPI:  This is a 64 y.o. female here for evaluation of : asymptomatic screening exam      ROS:  Constitutional: No fevers, chills, No weight loss  CV: No chest pain  Pulm: No cough, No shortness of breath  GI: see HPI  Derm: No rash    Medical History:  has a past medical history of Allergy, Arthritis, Blood clot in vein, Diabetes mellitus, Diabetes mellitus, type 2, Hyperlipidemia, Hypertension, and Uncontrolled type 2 diabetes mellitus with hyperglycemia (2/14/2020).    Surgical History:  has a past surgical history that includes Hysterectomy.    Family History: family history includes Arthritis in her mother; Breast cancer in her sister and sister; Cataracts in her maternal uncle and sister; Diabetes in her mother.. Otherwise no colon cancer, inflammatory bowel disease, or GI malignancies.    Social History:  reports that she has never smoked. She has never used smokeless tobacco. She reports that she does not drink alcohol and does not use drugs.    Review of patient's allergies indicates:  No Known Allergies    Medications:   Prescriptions Prior to Admission[1]      Physical Exam:    Vital Signs: There were no vitals filed for this visit.    Gen: NAD, lying comfortably  HENT: NCAT, oropharynx clear  Eyes: anicteric sclerae, EOMI grossly  Neck: supple, no visible masses/goiter  Cardiac: RRR  Lungs: non-labored breathing  Abd: soft, NT/ND, normoactive BS  Ext: no LE edema, warm, well perfused  Skin: skin intact on exposed body parts, no visible rashes, lesions  Neuro: A&Ox4, neuro exam grossly intact, moves all extremities  Psych: appropriate mood, affect        Labs:  Lab Results   Component Value Date    WBC 7.51 03/10/2025    HGB 12.6  03/10/2025    HCT 40.8 03/10/2025     03/10/2025    CHOL 156 03/10/2025    TRIG 53 03/10/2025    HDL 61 03/10/2025    ALT 17 03/10/2025    AST 38 03/10/2025     03/10/2025    K 4.2 03/10/2025     03/10/2025    CREATININE 0.8 03/10/2025    BUN 13 03/10/2025    CO2 27 03/10/2025    TSH 0.917 03/10/2025    HGBA1C 7.5 (H) 02/11/2025       Plan:  Colonoscopy for colon cancer screening.    I have explained the risks and benefits of endoscopy procedures to the patient including but not limited to bleeding, perforation, infection, and death.  The patient was asked if they understand and allowed to ask any further questions to their satisfaction.    Nery Fields MD         [1]   Medications Prior to Admission   Medication Sig Dispense Refill Last Dose/Taking    azelastine (ASTELIN) 137 mcg (0.1 %) nasal spray 2 sprays (274 mcg total) by Nasal route 2 (two) times daily. 30 mL 11     blood sugar diagnostic Strp Check blood glucose 2 times a day 200 strip 3     blood-glucose meter kit Test glucose 2x/day 1 each 0     ergocalciferol (VITAMIN D2) 50,000 unit Cap Take 1 capsule (50,000 Units total) by mouth every 7 days. 8 capsule 0     fluticasone propionate (FLONASE) 50 mcg/actuation nasal spray 1 spray (50 mcg total) by Each Nostril route once daily. 16 mL 11     levocetirizine (XYZAL) 5 MG tablet Take 1 tablet (5 mg total) by mouth every evening. 30 tablet 11     LIDOcaine (LIDODERM) 5 % Place 1 patch onto the skin daily as needed (shoulder pain). Remove & Discard patch within 12 hours or as directed by MD 30 patch 0     metFORMIN (GLUCOPHAGE-XR) 500 MG ER 24hr tablet Take 1 tablet (500 mg total) by mouth 2 (two) times daily with meals. 180 tablet 2     naproxen (NAPROSYN) 500 MG tablet Take 1 tablet (500 mg total) by mouth 2 (two) times daily. 60 tablet 0     pantoprazole (PROTONIX) 20 MG tablet Take 1 tablet (20 mg total) by mouth once daily. 90 tablet 0     rosuvastatin (CRESTOR) 20 MG tablet Take 1  tablet (20 mg total) by mouth once daily. 90 tablet 0     semaglutide (OZEMPIC) 2 mg/dose (8 mg/3 mL) PnIj Inject 2 mg into the skin every 7 days. 9 mL 2     valsartan (DIOVAN) 80 MG tablet Take 1 tablet (80 mg total) by mouth once daily. 90 tablet 2

## 2025-04-28 NOTE — PROV[employer] PATIENT INSTRUCTIONS
Discharge Summary/Instructions after an Endoscopic Procedure  Patient Name: Joanie Chandler  Patient MRN: [id number]  Patient YOB: 1960  Monday, April 28, 2025  Nery Fields MD  Dear patient,  As a result of recent federal legislation (The Federal Cures Act), you may   receive lab or pathology results from your procedure in your [facility]   account before your physician is able to contact you. Your physician or   their representative will relay the results to you with their   recommendations at their soonest availability.  Thank you,  RESTRICTIONS:  During your procedure today, you received medications for sedation.  These   medications may affect your judgment, balance and coordination.  Therefore,   for 24 hours, you have the following restrictions:   - DO NOT drive a car, operate machinery, make legal/financial decisions,   sign important papers or drink alcohol.    ACTIVITY:  Today: no heavy lifting, straining or running due to procedural   sedation/anesthesia.  The following day: return to full activity including work.  DIET:  Eat and drink normally unless instructed otherwise.     TREATMENT FOR COMMON SIDE EFFECTS:  - Mild abdominal pain, nausea, belching, bloating or excessive gas:  rest,   eat lightly and use a heating pad.  - Sore Throat: treat with throat lozenges and/or gargle with warm salt   water.  - Because air was used during the procedure, expelling large amounts of air   from your rectum or belching is normal.  - If a bowel prep was taken, you may not have a bowel movement for 1-3 days.    This is normal.  SYMPTOMS TO WATCH FOR AND REPORT TO YOUR PHYSICIAN:  1. Abdominal pain or bloating, other than gas cramps.  2. Chest pain.  3. Back pain.  4. Signs of infection such as: chills or fever occurring within 24 hours   after the procedure.  5. Rectal bleeding, which would show as bright red, maroon, or black stools.   (A tablespoon of blood from the rectum is not serious, especially if    hemorrhoids are present.)  6. Vomiting.  7. Weakness or dizziness.  GO DIRECTLY TO THE NEAREST EMERGENCY ROOM IF YOU HAVE ANY OF THE FOLLOWING:      Difficulty breathing              Chills and/or fever over 101 F   Persistent vomiting and/or vomiting blood   Severe abdominal pain   Severe chest pain   Black, tarry stools   Bleeding- more than one tablespoon   Any other symptom or condition that you feel may need urgent attention  Your doctor recommends these additional instructions:  If any biopsies were taken, your doctors clinic will contact you in 1 to 2   weeks with any results.  - Patient has a contact number available for emergencies.  The signs and   symptoms of potential delayed complications were discussed with the   patient.  Return to normal activities tomorrow.  Written discharge   instructions were provided to the patient.   - Discharge patient to home (with escort).   - Resume previous diet.   - Continue present medications.   - Await pathology results.   - Repeat colonoscopy in 5 years for surveillance.   - Return to primary care physician as previously scheduled.  For questions, problems or results please call your physician - Nery Fields MD at Work:  [phone].  [facility] can be reached at [phone]     IF A COMPLICATION OR EMERGENCY SITUATION ARISES AND YOU ARE UNABLE TO REACH   YOUR PHYSICIAN - GO DIRECTLY TO THE EMERGENCY ROOM.  Nery Fields MD  4/28/2025 9:41:20 AM  This report has been verified and signed electronically.  Dear patient,  As a result of recent federal legislation (The Federal Cures Act), you may   receive lab or pathology results from your procedure in your [facility]   account before your physician is able to contact you. Your physician or   their representative will relay the results to you with their   recommendations at their soonest availability.  Thank you,  [employer]ATION

## 2025-04-28 NOTE — ANESTHESIA POSTPROCEDURE EVALUATION
Anesthesia Post Evaluation    Patient: Joanie Chandler    Procedure(s) Performed: Procedure(s) (LRB):  COLONOSCOPY, SCREENING, LOW RISK PATIENT (N/A)    Final Anesthesia Type: general      Patient location during evaluation: GI PACU  Patient participation: Yes- Able to Participate  Level of consciousness: awake and alert, oriented and awake  Post-procedure vital signs: reviewed and stable  Airway patency: patent    PONV status at discharge: No PONV  Anesthetic complications: no      Cardiovascular status: blood pressure returned to baseline  Respiratory status: unassisted, spontaneous ventilation and room air  Hydration status: euvolemic  Follow-up not needed.              Vitals Value Taken Time   /64 04/28/25 09:51   Temp 36.4 °C (97.5 °F) 04/28/25 09:36   Pulse 71 04/28/25 09:51   Resp 14 04/28/25 09:51   SpO2 100 % 04/28/25 09:51         No case tracking events are documented in the log.      Pain/Wilfredo Score: Wilfredo Score: 10 (4/28/2025  9:51 AM)

## 2025-04-28 NOTE — TRANSFER OF CARE
Anesthesia Transfer of Care Note    Patient: Joanie Chandler    Procedure(s) Performed: Procedure(s) (LRB):  COLONOSCOPY, SCREENING, LOW RISK PATIENT (N/A)    Patient location: GI    Anesthesia Type: general    Transport from OR: Transported from OR on room air with adequate spontaneous ventilation    Post pain: adequate analgesia    Post assessment: no apparent anesthetic complications and tolerated procedure well    Post vital signs: stable    Level of consciousness: lethargic and responds to stimulation    Nausea/Vomiting: no nausea/vomiting    Complications: none    Transfer of care protocol was followed      Last vitals: Visit Vitals  BP (!) 100/56   Pulse 79   Temp 36.4 °C (97.5 °F)   Resp 18   SpO2 96%   Breastfeeding No

## 2025-04-29 ENCOUNTER — RESULTS FOLLOW-UP (OUTPATIENT)
Dept: GASTROENTEROLOGY | Facility: CLINIC | Age: 65
End: 2025-04-29

## 2025-04-29 LAB
ESTROGEN SERPL-MCNC: NORMAL PG/ML
INSULIN SERPL-ACNC: NORMAL U[IU]/ML
LAB AP CLINICAL INFORMATION: NORMAL
LAB AP GROSS DESCRIPTION: NORMAL
LAB AP PERFORMING LOCATION(S): NORMAL
LAB AP REPORT FOOTNOTES: NORMAL

## 2025-04-30 DIAGNOSIS — E11.9 TYPE 2 DIABETES MELLITUS WITHOUT COMPLICATION, UNSPECIFIED WHETHER LONG TERM INSULIN USE: ICD-10-CM

## 2025-09-05 DIAGNOSIS — E78.5 HYPERLIPIDEMIA, UNSPECIFIED HYPERLIPIDEMIA TYPE: ICD-10-CM

## 2025-09-05 DIAGNOSIS — I10 ESSENTIAL HYPERTENSION: ICD-10-CM

## 2025-09-05 RX ORDER — VALSARTAN 80 MG/1
80 TABLET ORAL DAILY
Qty: 90 TABLET | Refills: 0 | Status: SHIPPED | OUTPATIENT
Start: 2025-09-05

## 2025-09-05 RX ORDER — ROSUVASTATIN CALCIUM 20 MG/1
20 TABLET, COATED ORAL DAILY
Qty: 90 TABLET | Refills: 0 | Status: SHIPPED | OUTPATIENT
Start: 2025-09-05